# Patient Record
Sex: FEMALE | Race: WHITE | NOT HISPANIC OR LATINO | URBAN - METROPOLITAN AREA
[De-identification: names, ages, dates, MRNs, and addresses within clinical notes are randomized per-mention and may not be internally consistent; named-entity substitution may affect disease eponyms.]

---

## 2018-05-09 ENCOUNTER — RECORDS - HEALTHEAST (OUTPATIENT)
Dept: LAB | Facility: CLINIC | Age: 48
End: 2018-05-09

## 2018-05-10 LAB
ANION GAP SERPL CALCULATED.3IONS-SCNC: 7 MMOL/L (ref 5–18)
BUN SERPL-MCNC: 10 MG/DL (ref 8–22)
CALCIUM SERPL-MCNC: 9 MG/DL (ref 8.5–10.5)
CHLORIDE BLD-SCNC: 105 MMOL/L (ref 98–107)
CO2 SERPL-SCNC: 29 MMOL/L (ref 22–31)
CREAT SERPL-MCNC: 0.64 MG/DL (ref 0.6–1.1)
ERYTHROCYTE [DISTWIDTH] IN BLOOD BY AUTOMATED COUNT: 13.8 % (ref 11–14.5)
FOLATE SERPL-MCNC: 9.7 NG/ML
GFR SERPL CREATININE-BSD FRML MDRD: >60 ML/MIN/1.73M2
GLUCOSE BLD-MCNC: 83 MG/DL (ref 70–125)
HCT VFR BLD AUTO: 45.3 % (ref 35–47)
HGB BLD-MCNC: 14.6 G/DL (ref 12–16)
MCH RBC QN AUTO: 29.8 PG (ref 27–34)
MCHC RBC AUTO-ENTMCNC: 32.2 G/DL (ref 32–36)
MCV RBC AUTO: 92 FL (ref 80–100)
PLATELET # BLD AUTO: 221 THOU/UL (ref 140–440)
PMV BLD AUTO: 12.6 FL (ref 8.5–12.5)
POTASSIUM BLD-SCNC: 4 MMOL/L (ref 3.5–5)
RBC # BLD AUTO: 4.9 MILL/UL (ref 3.8–5.4)
SODIUM SERPL-SCNC: 141 MMOL/L (ref 136–145)
TSH SERPL DL<=0.005 MIU/L-ACNC: 0.95 UIU/ML (ref 0.3–5)
VIT B12 SERPL-MCNC: 338 PG/ML (ref 213–816)
WBC: 7.9 THOU/UL (ref 4–11)

## 2018-05-11 LAB — 25(OH)D3 SERPL-MCNC: 25.4 NG/ML (ref 30–80)

## 2018-06-25 ENCOUNTER — RECORDS - HEALTHEAST (OUTPATIENT)
Dept: ADMINISTRATIVE | Facility: OTHER | Age: 48
End: 2018-06-25

## 2018-10-22 ENCOUNTER — HOSPITAL ENCOUNTER (OUTPATIENT)
Dept: RESPIRATORY THERAPY | Facility: HOSPITAL | Age: 48
Discharge: HOME | End: 2018-10-22

## 2018-10-22 DIAGNOSIS — J44.9 CHRONIC OBSTRUCTIVE LUNG DISEASE (H): ICD-10-CM

## 2018-10-22 LAB — HGB BLD-MCNC: 14.7 G/DL (ref 12–16)

## 2018-11-28 ENCOUNTER — RECORDS - HEALTHEAST (OUTPATIENT)
Dept: LAB | Facility: CLINIC | Age: 48
End: 2018-11-28

## 2018-11-28 LAB
BASOPHILS # BLD AUTO: 0 THOU/UL (ref 0–0.2)
BASOPHILS NFR BLD AUTO: 0 % (ref 0–2)
C REACTIVE PROTEIN LHE: <0.1 MG/DL (ref 0–0.8)
EOSINOPHIL # BLD AUTO: 0.1 THOU/UL (ref 0–0.4)
EOSINOPHIL NFR BLD AUTO: 1 % (ref 0–6)
ERYTHROCYTE [DISTWIDTH] IN BLOOD BY AUTOMATED COUNT: 14.2 % (ref 11–14.5)
ERYTHROCYTE [SEDIMENTATION RATE] IN BLOOD BY WESTERGREN METHOD: 2 MM/HR (ref 0–20)
HCT VFR BLD AUTO: 46.3 % (ref 35–47)
HGB BLD-MCNC: 14.8 G/DL (ref 12–16)
LYMPHOCYTES # BLD AUTO: 1.4 THOU/UL (ref 0.8–4.4)
LYMPHOCYTES NFR BLD AUTO: 16 % (ref 20–40)
MCH RBC QN AUTO: 29.3 PG (ref 27–34)
MCHC RBC AUTO-ENTMCNC: 32 G/DL (ref 32–36)
MCV RBC AUTO: 92 FL (ref 80–100)
MONOCYTES # BLD AUTO: 0.6 THOU/UL (ref 0–0.9)
MONOCYTES NFR BLD AUTO: 7 % (ref 2–10)
NEUTROPHILS # BLD AUTO: 6.8 THOU/UL (ref 2–7.7)
NEUTROPHILS NFR BLD AUTO: 76 % (ref 50–70)
PLATELET # BLD AUTO: 211 THOU/UL (ref 140–440)
PMV BLD AUTO: 12.3 FL (ref 8.5–12.5)
RBC # BLD AUTO: 5.05 MILL/UL (ref 3.8–5.4)
RHEUMATOID FACT SERPL-ACNC: <15 IU/ML (ref 0–30)
WBC: 8.9 THOU/UL (ref 4–11)

## 2018-11-29 LAB
25(OH)D3 SERPL-MCNC: 40.2 NG/ML (ref 30–80)
ANA SER QL: 0.8 U

## 2019-07-10 ENCOUNTER — HOSPITAL ENCOUNTER (INPATIENT)
Facility: CLINIC | Age: 49
LOS: 9 days | Discharge: HOME | DRG: 885 | End: 2019-07-19
Attending: PSYCHIATRY & NEUROLOGY | Admitting: PSYCHIATRY & NEUROLOGY
Payer: COMMERCIAL

## 2019-07-10 ENCOUNTER — TELEPHONE (OUTPATIENT)
Dept: BEHAVIORAL HEALTH | Facility: CLINIC | Age: 49
End: 2019-07-10

## 2019-07-10 DIAGNOSIS — F41.1 GAD (GENERALIZED ANXIETY DISORDER): ICD-10-CM

## 2019-07-10 DIAGNOSIS — J45.31 MILD PERSISTENT ASTHMA WITH EXACERBATION: ICD-10-CM

## 2019-07-10 DIAGNOSIS — G25.0 ESSENTIAL TREMOR: ICD-10-CM

## 2019-07-10 DIAGNOSIS — F33.2 SEVERE RECURRENT MAJOR DEPRESSION WITHOUT PSYCHOTIC FEATURES (H): Primary | ICD-10-CM

## 2019-07-10 PROBLEM — F32.A DEPRESSION: Status: ACTIVE | Noted: 2019-07-10

## 2019-07-10 LAB
ANION GAP SERPL CALCULATED.3IONS-SCNC: 7 MMOL/L (ref 3–14)
BASOPHILS # BLD AUTO: 0 10E9/L (ref 0–0.2)
BASOPHILS NFR BLD AUTO: 0.1 %
BUN SERPL-MCNC: 15 MG/DL (ref 7–30)
CALCIUM SERPL-MCNC: 9.1 MG/DL (ref 8.5–10.1)
CHLORIDE SERPL-SCNC: 106 MMOL/L (ref 94–109)
CO2 SERPL-SCNC: 29 MMOL/L (ref 20–32)
CREAT SERPL-MCNC: 0.74 MG/DL (ref 0.52–1.04)
DIFFERENTIAL METHOD BLD: ABNORMAL
EOSINOPHIL # BLD AUTO: 0 10E9/L (ref 0–0.7)
EOSINOPHIL NFR BLD AUTO: 0.1 %
ERYTHROCYTE [DISTWIDTH] IN BLOOD BY AUTOMATED COUNT: 13.8 % (ref 10–15)
GFR SERPL CREATININE-BSD FRML MDRD: >90 ML/MIN/{1.73_M2}
GLUCOSE SERPL-MCNC: 118 MG/DL (ref 70–99)
HCT VFR BLD AUTO: 46.4 % (ref 35–47)
HGB BLD-MCNC: 15.4 G/DL (ref 11.7–15.7)
IMM GRANULOCYTES # BLD: 0 10E9/L (ref 0–0.4)
IMM GRANULOCYTES NFR BLD: 0.2 %
LYMPHOCYTES # BLD AUTO: 0.8 10E9/L (ref 0.8–5.3)
LYMPHOCYTES NFR BLD AUTO: 8.2 %
MCH RBC QN AUTO: 29.8 PG (ref 26.5–33)
MCHC RBC AUTO-ENTMCNC: 33.2 G/DL (ref 31.5–36.5)
MCV RBC AUTO: 90 FL (ref 78–100)
MONOCYTES # BLD AUTO: 0.4 10E9/L (ref 0–1.3)
MONOCYTES NFR BLD AUTO: 4.6 %
NEUTROPHILS # BLD AUTO: 8.4 10E9/L (ref 1.6–8.3)
NEUTROPHILS NFR BLD AUTO: 86.8 %
NRBC # BLD AUTO: 0 10*3/UL
NRBC BLD AUTO-RTO: 0 /100
PLATELET # BLD AUTO: 185 10E9/L (ref 150–450)
POTASSIUM SERPL-SCNC: 4.3 MMOL/L (ref 3.4–5.3)
RBC # BLD AUTO: 5.16 10E12/L (ref 3.8–5.2)
SODIUM SERPL-SCNC: 142 MMOL/L (ref 133–144)
TSH SERPL DL<=0.005 MIU/L-ACNC: 0.62 MU/L (ref 0.4–4)
WBC # BLD AUTO: 9.6 10E9/L (ref 4–11)

## 2019-07-10 PROCEDURE — 84443 ASSAY THYROID STIM HORMONE: CPT | Performed by: PSYCHIATRY & NEUROLOGY

## 2019-07-10 PROCEDURE — 80048 BASIC METABOLIC PNL TOTAL CA: CPT | Performed by: PSYCHIATRY & NEUROLOGY

## 2019-07-10 PROCEDURE — 36415 COLL VENOUS BLD VENIPUNCTURE: CPT | Performed by: PSYCHIATRY & NEUROLOGY

## 2019-07-10 PROCEDURE — 12400000 ZZH R&B MH

## 2019-07-10 PROCEDURE — 90853 GROUP PSYCHOTHERAPY: CPT

## 2019-07-10 PROCEDURE — 25000132 ZZH RX MED GY IP 250 OP 250 PS 637: Performed by: PSYCHIATRY & NEUROLOGY

## 2019-07-10 PROCEDURE — 85025 COMPLETE CBC W/AUTO DIFF WBC: CPT | Performed by: PSYCHIATRY & NEUROLOGY

## 2019-07-10 RX ORDER — PREDNISONE 1 MG/1
1-4 TABLET ORAL DAILY
Status: ON HOLD | COMMUNITY
Start: 2019-07-04 | End: 2019-07-19

## 2019-07-10 RX ORDER — NICOTINE 21 MG/24HR
1 PATCH, TRANSDERMAL 24 HOURS TRANSDERMAL EVERY 24 HOURS
Status: DISCONTINUED | OUTPATIENT
Start: 2019-07-10 | End: 2019-07-10

## 2019-07-10 RX ORDER — QUETIAPINE FUMARATE 50 MG/1
150 TABLET, FILM COATED ORAL AT BEDTIME
Status: ON HOLD | COMMUNITY
End: 2019-07-19

## 2019-07-10 RX ORDER — GABAPENTIN 100 MG/1
100 CAPSULE ORAL 2 TIMES DAILY
COMMUNITY

## 2019-07-10 RX ORDER — VENLAFAXINE 37.5 MG/1
37.5 TABLET ORAL 2 TIMES DAILY
Status: DISCONTINUED | OUTPATIENT
Start: 2019-07-10 | End: 2019-07-11

## 2019-07-10 RX ORDER — PREDNISONE 1 MG/1
1-4 TABLET ORAL DAILY
Status: DISCONTINUED | OUTPATIENT
Start: 2019-07-10 | End: 2019-07-10

## 2019-07-10 RX ORDER — PREDNISONE 1 MG/1
3 TABLET ORAL DAILY
Status: DISPENSED | OUTPATIENT
Start: 2019-07-10 | End: 2019-07-13

## 2019-07-10 RX ORDER — PREDNISONE 1 MG/1
1 TABLET ORAL DAILY
Status: DISCONTINUED | OUTPATIENT
Start: 2019-07-18 | End: 2019-07-19 | Stop reason: HOSPADM

## 2019-07-10 RX ORDER — HYDROXYZINE HYDROCHLORIDE 25 MG/1
25 TABLET, FILM COATED ORAL EVERY 4 HOURS PRN
Status: DISCONTINUED | OUTPATIENT
Start: 2019-07-10 | End: 2019-07-19 | Stop reason: HOSPADM

## 2019-07-10 RX ORDER — NICOTINE 21 MG/24HR
1 PATCH, TRANSDERMAL 24 HOURS TRANSDERMAL DAILY
Status: DISCONTINUED | OUTPATIENT
Start: 2019-07-10 | End: 2019-07-19 | Stop reason: HOSPADM

## 2019-07-10 RX ORDER — PREDNISONE 1 MG/1
2 TABLET ORAL DAILY
Status: COMPLETED | OUTPATIENT
Start: 2019-07-13 | End: 2019-07-17

## 2019-07-10 RX ORDER — VENLAFAXINE 37.5 MG/1
37.5 TABLET ORAL 2 TIMES DAILY
Status: ON HOLD | COMMUNITY
End: 2019-07-19

## 2019-07-10 RX ORDER — DULOXETIN HYDROCHLORIDE 30 MG/1
30 CAPSULE, DELAYED RELEASE ORAL DAILY
Status: ON HOLD | COMMUNITY
End: 2019-07-19

## 2019-07-10 RX ORDER — NICOTINE 21 MG/24HR
1 PATCH, TRANSDERMAL 24 HOURS TRANSDERMAL EVERY 24 HOURS
COMMUNITY
End: 2022-08-29

## 2019-07-10 RX ADMIN — QUETIAPINE FUMARATE 150 MG: 100 TABLET ORAL at 22:01

## 2019-07-10 RX ADMIN — VENLAFAXINE HYDROCHLORIDE 37.5 MG: 37.5 TABLET ORAL at 22:06

## 2019-07-10 RX ADMIN — GABAPENTIN 400 MG: 300 CAPSULE ORAL at 21:12

## 2019-07-10 RX ADMIN — NICOTINE 1 PATCH: 21 PATCH, EXTENDED RELEASE TRANSDERMAL at 16:43

## 2019-07-10 ASSESSMENT — ACTIVITIES OF DAILY LIVING (ADL)
TOILETING: 0-->INDEPENDENT
COGNITION: 0 - NO COGNITION ISSUES REPORTED
FALL_HISTORY_WITHIN_LAST_SIX_MONTHS: NO
RETIRED_EATING: 0-->INDEPENDENT
TRANSFERRING: 0-->INDEPENDENT
SWALLOWING: 0-->SWALLOWS FOODS/LIQUIDS WITHOUT DIFFICULTY
DRESS: 0-->INDEPENDENT
RETIRED_COMMUNICATION: 0-->UNDERSTANDS/COMMUNICATES WITHOUT DIFFICULTY
BATHING: 0-->INDEPENDENT
AMBULATION: 0-->INDEPENDENT

## 2019-07-10 ASSESSMENT — MIFFLIN-ST. JEOR: SCORE: 1457.31

## 2019-07-10 NOTE — PHARMACY-ADMISSION MEDICATION HISTORY
Admission medication history interview status for the 7/10/2019  admission is complete. See EPIC admission navigator for prior to admission medications     Medication history source reliability:Good    Actions taken by pharmacist (provider contacted, etc): Chart review. Face to face interview with patient      Additional medication history information not noted on PTA med list :None    Medication reconciliation/reorder completed by provider prior to medication history? No    Time spent in this activity: 15 minutes    Prior to Admission medications    Medication Sig Last Dose Taking? Auth Provider   cariprazine (VRAYLAR) 3 MG CAPS capsule Take 3 mg by mouth daily 7/10/2019 at am Yes Unknown, Entered By History   DULoxetine (CYMBALTA) 30 MG capsule Take 30 mg by mouth daily  Yes Unknown, Entered By History   Fluticasone-Umeclidin-Vilanterol (TRELEGY ELLIPTA) 100-62.5-25 MCG/INH oral inhaler Inhale 1 puff into the lungs daily 7/9/2019 at Unknown time Yes Unknown, Entered By History   gabapentin (NEURONTIN) 400 MG capsule Take 400 mg by mouth At Bedtime 7/9/2019 at hs Yes Unknown, Entered By History   nicotine (NICODERM CQ) 21 MG/24HR 24 hr patch Place 1 patch onto the skin every 24 hours 7/9/2019 at Unknown time Yes Unknown, Entered By History   nicotine (NICORETTE) 4 MG lozenge Place 4 mg inside cheek as needed for smoking cessation Past Week at prn Yes Unknown, Entered By History   predniSONE (DELTASONE) 1 MG tablet Take 1-4 mg by mouth daily Taper started 7/4, Thursday. 4 mg daily x 5 days, 3 mg daily x 5 days, 2 mg daily x 5 days, 1 mg daily x 5 days. 7/9/2019 at am Yes Unknown, Entered By History   QUEtiapine (SEROQUEL) 50 MG tablet Take 150 mg by mouth At Bedtime 7/9/2019 at hs Yes Unknown, Entered By History   venlafaxine (EFFEXOR) 37.5 MG tablet Take 37.5 mg by mouth 2 times daily 7/10/2019 at am Yes Unknown, Entered By History       .

## 2019-07-10 NOTE — TELEPHONE ENCOUNTER
R: author called [facility] at 9 am and left a vm on their voice mail stating we have not yet received copies of pt's labs and asking them to fax them when available. juliana  Labs are WNL other than cloudy urine, trace leukocytes, few bacteria, WBC clumps present, sqaum epithel UA , trans epithel UA 5-10, mucuss UA many. Utox neg. Preg test neg. Author left a vm for Dada at 10:17 am asking for a call back. juliana  Per Shawna, pt does not have a UTI. juliana Garland said to ensure pt is informed she will be admitting to [facility] so she is aware she would be in [place], [place]. Author discussed w/ Shawna at [facility] who said she will clarify this with pt and will call back. juliana  #77/dada accepted for [facility]oren                              Per Shawna, pt understands she is coming to [place] and is voluntary.                                  Author faxed clinical to #[phone]                                   juliana

## 2019-07-10 NOTE — PROGRESS NOTES
Eva , was admitted with depression from [facility].  She was just discharge two weeks ago.  Her depression is getting worse, she is unable to go to work,  Has suicidal thoughts but not a plan,  Has COPD , that makes her depressed, because at times she can not breath, has  Back pain with peripheral neuropathies and that bothers her as well.  When she was discharged from [facility] they put her on Cymbalta 30 mg, for her depression and nerve pain, but she stopped because the medication gave her diarrhea.    This morning she feeling extremely depressed, and having suicidal thoughts , She cries on and off during the interview, at this times does not feel suicidal, contracts for safety.  Admission completed.

## 2019-07-10 NOTE — PROGRESS NOTES
07/10/19 1506   Patient Belongings   Did you bring any home meds/supplements to the hospital?  Yes   Disposition of meds  Pharmacy approved for patient use (see comment);Other (see comment)   Patient Belongings locker;other (see comments)   Patient Belongings Put in Hospital Secure Location (Security or Locker, etc.) clothing;other (see comments)   Belongings Search Yes   Clothing Search Yes   Second Staff Lidia     Inhaler x3   Inhaler Capsules  Nicotine Patch x2  CPAP  Drawstring Bag  Plastic Baggies  Sweater  Work Seek  [place]  Umbrella  Single Glove  Leggings  Shirt  Bra  Shoes w/Strings  Purse  Charging Cords x2  Liquid Breathe Mint  Portable  x2  Nicotine Lozenge  Lens Towelette  Ear Buds x2  [place] Badge  Cigarettes  Keys x2  Lip Balm x3  Cell Phone  Lighter  $4 Cash  [place] Drivers License  Health [employer] Card  [employer] Card    Security Envelope #[id number]:   Check Book #[id number]   [employer] Card   Visa #[id number]   M.C. #[id number]   [address]   M.C. #[id number]   [employer] Card #[id number]   Care Credit #[id number]   M.C. #[id number]   Visa #[id number]    Admission:  I am responsible for any personal items that are not sent to the safe or pharmacy. [facility] is not responsible for loss, theft or damage of any property in my possession.        Patient Signature: ___________________________________________      Date/Time:__________________________     Staff Signature: __________________________________      Date/Time:__________________________     [facility] Staff person, if patient is unable/unwilling to sign:      __________________________________________________________      Date/Time: __________________________        Discharge:  [facility] has returned all of my personal belongings:     Patient Signature: ________________________________________     Date/Time: ____________________________________     Staff Signature: ______________________________________     Date/Time:_____________________________________

## 2019-07-10 NOTE — TELEPHONE ENCOUNTER
"S: 49 y/o female presented to the [facility] ED with depression and SI.    B: Hx depression, COPD, tobacco use d/o, GERD, chronic leg and back pain.  SI w/a plan to overdose on her antidepressants.  Pt was recently discharged from [facility] on 6/26 after having a similar presentation.  Pt reported increased depression and SI due to \"exacerbation of her medical and mobility limitations, as well as overall decline of her general health and well-being.\"  Pt also reported anxiety around her ability to walk and hold herself upright as well as issues with balance at times but denied hx of falls.  Collateral obtained from pt's son who reported concerns for her safety if she were to be alone at home.  Denied HI, psychosis, substance abuse or prior suicide attempts.  Pt reported compliance with medications but has been experiencing negative side effects, such as diarrhea.      A: Voluntary  Medically cleared.  Pt uses a CPAP at night   reported pt ambulates independently    R: CSW Katherine reported pt did not have labs drawn for this encounter.  This writer requested CBC, CMP and drug screen.  Awaiting results from [facility].  0700 Case has been passed to day shift to follow.  "

## 2019-07-11 PROCEDURE — 99207 ZZC CONSULT E&M CHANGED TO INITIAL LEVEL: CPT | Performed by: PHYSICIAN ASSISTANT

## 2019-07-11 PROCEDURE — 99222 1ST HOSP IP/OBS MODERATE 55: CPT | Performed by: PHYSICIAN ASSISTANT

## 2019-07-11 PROCEDURE — 25000132 ZZH RX MED GY IP 250 OP 250 PS 637: Performed by: PSYCHIATRY & NEUROLOGY

## 2019-07-11 PROCEDURE — 12400000 ZZH R&B MH

## 2019-07-11 PROCEDURE — 90853 GROUP PSYCHOTHERAPY: CPT

## 2019-07-11 PROCEDURE — 25000131 ZZH RX MED GY IP 250 OP 636 PS 637: Performed by: PSYCHIATRY & NEUROLOGY

## 2019-07-11 RX ORDER — VENLAFAXINE HYDROCHLORIDE 75 MG/1
75 CAPSULE, EXTENDED RELEASE ORAL
Status: DISCONTINUED | OUTPATIENT
Start: 2019-07-12 | End: 2019-07-15

## 2019-07-11 RX ADMIN — FLUTICASONE FUROATE AND VILANTEROL TRIFENATATE 1 PUFF: 100; 25 POWDER RESPIRATORY (INHALATION) at 08:33

## 2019-07-11 RX ADMIN — NICOTINE 1 PATCH: 21 PATCH, EXTENDED RELEASE TRANSDERMAL at 08:33

## 2019-07-11 RX ADMIN — QUETIAPINE FUMARATE 150 MG: 100 TABLET ORAL at 22:03

## 2019-07-11 RX ADMIN — GABAPENTIN 400 MG: 300 CAPSULE ORAL at 21:40

## 2019-07-11 RX ADMIN — PREDNISONE 3 MG: 1 TABLET ORAL at 08:32

## 2019-07-11 RX ADMIN — CARIPRAZINE 3 MG: 1.5 CAPSULE, GELATIN COATED ORAL at 08:32

## 2019-07-11 RX ADMIN — UMECLIDINIUM 1 PUFF: 62.5 AEROSOL, POWDER ORAL at 08:35

## 2019-07-11 RX ADMIN — VORTIOXETINE 5 MG: 5 TABLET, FILM COATED ORAL at 21:40

## 2019-07-11 RX ADMIN — VENLAFAXINE HYDROCHLORIDE 37.5 MG: 37.5 TABLET ORAL at 08:32

## 2019-07-11 ASSESSMENT — ACTIVITIES OF DAILY LIVING (ADL)
HYGIENE/GROOMING: PROMPTS
LAUNDRY: UNABLE TO COMPLETE
ORAL_HYGIENE: PROMPTS
DRESS: PROMPTS
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
## 2019-07-11 NOTE — PROGRESS NOTES
07/11/19 1300   General Information   Has Not Attended OT as of: 07/11/19     Pt has not attended OT since admit.  Will continue to encourage participation and completion of  self-assessment as able. OT staff will explain the purpose of being involved with treatment plan and provide options to meet current needs and goals.

## 2019-07-11 NOTE — PLAN OF CARE
Pt presents with tense affect and anxious mood. Pt states she is not feeling suicidal but only because she knows there are no means to do so while on the unit. While inquiring about her day she was visibly shaking and when questioned about it she said it's because of her anxiety regarding current life situation. Med compliant, no Si. Visible on the unit.

## 2019-07-11 NOTE — PROGRESS NOTES
Hospitalist admission note dictated. Confirmation #: [id number].    JoAnna Barthell, PA-C  7/11/2019   1:56 PM

## 2019-07-11 NOTE — PLAN OF CARE
BEHAVIORAL TEAM DISCUSSION    Participants: *MD, RN's, CM, PA, OT   Progress: **Pt still feeling hopeless*  Continued Stay Criteria/Rationale: Pt needs further stabilization  Medical/Physical: NA  Precautions: Code 1  Behavioral Orders   Procedures    Code 1 - Restrict to Unit    Routine Programming     As clinically indicated    Status 15     Every 15 minutes.     Plan: **Pt to continue medication adjustment*  Rationale for change in precautions or plan: Pt has not improved and is still feeling hopeless

## 2019-07-11 NOTE — CONSULTS
Consult Date:  07/11/2019      PRIMARY CARE PROVIDER:  Reymundo Avila MD.      REQUESTING PROVIDER:  Dr. Wilkins.      REASON FOR CONSULTATION:  Medical H&P.      History is provided by the patient.      HISTORY OF PRESENT ILLNESS:  Eva Parikh is a 48-year-old female with nicotine dependence, GERD, COPD, insomnia, and anxiety/depression who was admitted under the care of inpatient psychiatry after presenting with suicidal ideation.  She was recently admitted at [facility] for 1 day on 06/25 to 06/26 for similar.      The patient reports her depression symptoms have been worse in the last 2 weeks, ever since her discharge.  Regarding her medical history, she has COPD and she is currently on a steroid taper with remaining approximately 15 days of 3 mg x5, 2 mg x5, 1 mg x5.  She reports this was started when seen in followup from her brief psychiatric hospitalization approximately 2 weeks ago when the provider heard wheezing on physical exam.  She reports she is no longer wheezing and her breathing is at baseline, but she would like to continue the prednisone taper.  She is compliant with her medications.  The only other symptom she reports is 1 episode of diarrhea yesterday that has since resolved.      She denies recent fevers, URI symptoms, cough worse than baseline, chest discomfort, shortness of breath beyond her baseline, abdominal pain, nausea, vomiting, constipation, and urinary symptoms.  She states that she sometimes feels like she gets swelling in legs, but others tell her that her legs look normal.      PAST MEDICAL HISTORY:   1.  COPD.   2.  GERD.   3.  Nicotine dependence.   4.  Anxiety/depression.   5.  Insomnia.      PAST SURGICAL HISTORY:  Tonsillectomy and adenoidectomy.      FAMILY HISTORY:   Mother:  Heart disease.   Maternal grandmother:  Heart disease.      SOCIAL HISTORY:  One pack per day starting at the age of 10, giving her an estimated 19-dmgx-rytl history.  She does report  that she has been smoking more in the last 2 weeks.   No illicit drug use or alcohol use.  She is currently employed as a  for [employer].      PRIOR TO ADMISSION MEDICATIONS:  Reviewed in Epic.      ALLERGIES:  NO KNOWN DRUG ALLERGIES.      PHYSICAL EXAMINATION:   VITAL SIGNS:  Blood pressure 117/71, heart rate is 104, temperature 97.9, respirations 16, oxygen saturation 91%.   GENERAL:  A pleasant female who appears stated age.  She appears anxious and has a mild tremor in her left upper extremity.  She is slow to respond at times.   SKIN:  Warm, dry.  No rash or lesions on the skin.   HEENT:  Normocephalic, atraumatic.  EOMs grossly intact and PERRLA.  Moist mucous membranes.   NECK:  Supple.  No cervical lymphadenopathy or JVD.   CHEST:  Breath sounds with faint scattered wheezes on expiration and no increased work of breathing.   CARDIOVASCULAR:  Regular rate and rhythm.  No rub or murmur.  No peripheral edema.   ABDOMEN:  Soft, nontender, nondistended.   MUSCULOSKELETAL:  Moves all 4 extremities.   NEUROLOGIC:  Cranial nerves II-XII grossly intact.      LABORATORY DATA:  Sodium 142, potassium 4.3, creatinine 0.74, glucose 118.  WBC 9.6, hemoglobin 15.4, platelets 185,000.   TSH 0.62.      IMAGING:  None.      ASSESSMENT AND PLAN:  Eva Parikh is a 48-year-old female with insomnia, anxiety/depression, GERD, nicotine dependence, and COPD who is admitted under the care of inpatient psychiatry after presenting with suicidal ideation in setting of worsening depression over the past 2 weeks with recent [facility] admission 06/25 to 06/26.  Hospitalist Service was consulted for medical H&P.     1.  Suicidal ideation.   2.  Anxiety/depression.   3.  Insomnia.   -- Basic labs and TSH do not suggest an organic cause.  She does not describe any concerning signs or symptoms that would suggest an organic cause.   -- Defer management to inpatient psychiatry.   -- Recommend obtaining EKG if  ECT is consideration during this hospitalization and would have Hospitalist review.  Otherwise, no urgent indication to obtain EKG.     4.  Chronic obstructive pulmonary disease with ongoing tobacco dependence.   -- Scattered faint expiratory wheezes on physical exam.  Started on prednisone taper .   -- Continue prednisone taper as prescribed.   -- Encourage smoking cessation and nicotine patch.   5.  Gastroesophageal reflux disease.  Continue PTA PPI inhibitor.     Deep venous thrombosis prophylaxis:  Ambulation.      CODE STATUS:  Deferred full code.      DISPOSITION:  Discharge per inpatient psychiatry.      Hospitalist Service thanks you for this consultation.  At this time, will sign off as there are no acute medical conditions that require daily hospital rounding.  As above, the PCP has consideration this hospital stay.  Please obtain screening EKG ask Hospitalist for review.  Please call with acute concerns.      This patient was discussed with Dr. Inessa Carvalho of the Hospitalist Service who is in agreement with my assessment and plan of care as outlined above.         INESSA CARVALHO MD       As dictated by JOANNA ULMEN BARTHELL, PA-C            D: 2019   T: 2019   MT: GENNA      Name:     SUSANNA OTOOLE   MRN:      [id number]        Account:       [id number]   :      1970           Consult Date:  2019      Document: [id number]       cc: Magali Wilkins MD

## 2019-07-12 PROCEDURE — 25000131 ZZH RX MED GY IP 250 OP 636 PS 637: Performed by: PSYCHIATRY & NEUROLOGY

## 2019-07-12 PROCEDURE — 90853 GROUP PSYCHOTHERAPY: CPT

## 2019-07-12 PROCEDURE — 90791 PSYCH DIAGNOSTIC EVALUATION: CPT

## 2019-07-12 PROCEDURE — 12400000 ZZH R&B MH

## 2019-07-12 PROCEDURE — 25000132 ZZH RX MED GY IP 250 OP 250 PS 637: Performed by: PSYCHIATRY & NEUROLOGY

## 2019-07-12 RX ADMIN — HYDROXYZINE HYDROCHLORIDE 25 MG: 25 TABLET ORAL at 17:05

## 2019-07-12 RX ADMIN — FLUTICASONE FUROATE AND VILANTEROL TRIFENATATE 1 PUFF: 100; 25 POWDER RESPIRATORY (INHALATION) at 08:20

## 2019-07-12 RX ADMIN — UMECLIDINIUM 1 PUFF: 62.5 AEROSOL, POWDER ORAL at 08:20

## 2019-07-12 RX ADMIN — NICOTINE POLACRILEX 2 MG: 2 GUM, CHEWING ORAL at 14:50

## 2019-07-12 RX ADMIN — GABAPENTIN 400 MG: 300 CAPSULE ORAL at 21:08

## 2019-07-12 RX ADMIN — NICOTINE 1 PATCH: 21 PATCH, EXTENDED RELEASE TRANSDERMAL at 08:19

## 2019-07-12 RX ADMIN — PREDNISONE 3 MG: 1 TABLET ORAL at 08:20

## 2019-07-12 RX ADMIN — QUETIAPINE FUMARATE 150 MG: 100 TABLET ORAL at 22:06

## 2019-07-12 RX ADMIN — NICOTINE POLACRILEX 2 MG: 2 GUM, CHEWING ORAL at 09:57

## 2019-07-12 RX ADMIN — NICOTINE POLACRILEX 2 MG: 2 GUM, CHEWING ORAL at 19:37

## 2019-07-12 RX ADMIN — VENLAFAXINE HYDROCHLORIDE 75 MG: 75 CAPSULE, EXTENDED RELEASE ORAL at 08:20

## 2019-07-12 RX ADMIN — VORTIOXETINE 5 MG: 5 TABLET, FILM COATED ORAL at 19:37

## 2019-07-12 NOTE — H&P
Case Management Social History    This information has been obtained from patient's chart and through a personal interview with patient. Patient was noted to be very tense and anxious throughout the interview. Patient is deemed a reliable historian.       Reason for Admission:  Eva Parikh is a 48 year old single female admitted to [facility] on 07/10/19. She is currently hospitalized voluntarily. She states that she has been having increasing suicidal thoughts over the past two weeks. Her son brought her to the emergency department at [facility] in [place], and then she was consequently transferred to [facility]. She states that her medical issues related to COPD and chronic pain have been the primary factor in her decompensation.       Previous Mental & Chemical Dependency History:  She has had four previous mental health hospitalizations at [facility] in [place], dating back to 2010. Her most recent admission was in June of this year. She has no prior history of ECT or civil commitment.     She drinks five to six cans of pop with caffeine daily. She smokes one pack of cigarettes daily, but states that she has been smoking more than that recently. She states that she rarely drinks alcohol. She denies any issues with gambling or illicit drug usage. She has no prior history of chemical dependency treatment or DWIs.       Social History:  She is single and has never been . She has one son who is 37 years old. She was born in [place] and raised in [place]. Her mother continues to reside in [place]. She states that she does not know anything about her father. She has one younger sister. There is a family history of depression in her mother, aunt and sister.       Amish: She does not identify with any spiritual or Islam orientation.        History: She has no prior history of  service.       Education  and Work History:  She graduated from [employer] in [place] on 1987. She attended [employer] where she studied administrative support. She works full time at [employer] as a . She has been employed there for the past 12 years. She states that she last worked a week ago on Tuesday.       Living Situation: She lives in a house in [place] with her adult son.       Financial Status/Stressors:  She does not currently receive social security or disability income.       Medication Coverage: She denies any issues with affording her medication co-pays at this time.       Insurance:  She has [employer] for insurance.       Legal Issues:  She denies any current legal issues. She is her own guardian.       Community Resources: She sees Dr. Reymundo Avila at [facility] in [place] for primary care.  She sees Salvatore Hinds DNP, CNP at [facility] in [place] for medication management and [facility] for therapy. She states that she has upcoming appointments in July with all of her providers. She is able to drive to all of her appointments.       Social Functioning:  She enjoys doing puzzles and knitting.         Discharge Considerations:  She states that she will likely need a return to work letter from the psychiatrist. She is concerned over how she will get back home as she states that her son does not drive very far from home. She was encouraged to reach out to friends, family, neighbors or co-workers to see if anyone would be available to pick her up when she does discharge. Case Management will remain available to assist with any discharge needs.

## 2019-07-12 NOTE — PROGRESS NOTES
07/12/19 1100   General Information   Date Initially Attended OT 07/12/19   Clinical Impression   Affect Anxious   Orientation Oriented to person, place and time   Appearance and ADLs General cleanliness observed in most areas   Attention to Internal Stimuli No observed signs   Interaction Skills Interacts with prompts, minimal response   Ability to Communicate Needs Does so with prompts   Verbal Content Appropriate to topic   Ability to Maintain Boundaries Maintains appropriate physical boundaries;Maintains appropriate verbal boundaries   Participation Participates with frequent encouragement   Concentration Concentrates 5-10 minutes   Ability to Concentrate With structure   Follows and Comprehends Directions Independently follows multi-step directions   Memory Delayed and immediate recall intact   Organization Independently organizes medium tasks   Decision Making Needs further assessment   Planning and Problem Solving Needs further assessment   Ability to Apply and Learn Concepts Needs further assessment   Frustrations / Stress Tolerance Needs further assessment   Level of Insight Some insight   Self Esteem Poor self esteem   Social Supports Has knowledge of support systems   General Observation/Plan   General Observations/Plan See Comments     INITIAL O.T. ASSESSMENT:      Pt transitioned to OT group with MIN encouragement and engaged in therapeutic activity addressing functional cognition and interpersonal skills with task set up and extended time. Pt participated in therapeutic group activity and discussion addressing balanced scheduling/weekend planning. With task set up, pt ID'd activities to participate in and complete within the four categories of activities (self care, productivity, leisure, social), e.g. Shower, eat meals, attend group, watch TV, do a puzzle, play cards. Pt will continue to benefit from OT intervention to address implementation of positive functional coping skills, role performance,  and community reintegration.     OT assessment completed by semi-structured interview and direct observation. Cited SI as the reason for current hospitalization. Goals ID'd include to ID and implement new coping skills, increase motivation, and improve time management. OT staff explained the purpose of pt being involved in current treatment plan.      Plan: Encourage ongoing attendance as able to meet self-stated goals, implement positive coping skills, engage in therapeutic activities, and provide assessment ongoing.

## 2019-07-12 NOTE — PLAN OF CARE
Pt presented with a flat - blunt affect and depressed mood. Behavior is appropriate, thinking process is impaired and insight is poor. Brightens upon approach.Pt did not speak much during 1:1 but would participate upon encouragement  Denied SI, ate her meal, attended groups, and was med compliant

## 2019-07-12 NOTE — PLAN OF CARE
Affect flat/blunt, brightens slightly on approach and during conversation. Mood calm. Pt self motivated, showered. Attended groups. Quiet and soft spoken, less wide eyed. Appears less paranoid, does not appear guarded.

## 2019-07-12 NOTE — PROGRESS NOTES
[facility] Psychiatric Progress Note      Interval History:   Pt seen, team meeting held with , nursing staff, OT, and PA's to review diagnosis and treatment plan. Patient reports that she is still very anxious and tremulous. She says she is not ready for discharge. She reports absence of self harm thoughts, plans or intent. She says she did not realize that she is going off Vraylar. She was advised of her current medications and she verbalized understanding. She says she needs both nicotine gum and the patch to miller off her cravings for tobacco. She has not experienced any untoward effects from currently prescribed medications.     Review of systems:   The Review of Systems is negative other than noted in the HPI     Medications:       fluticasone-vilanterol  1 puff Inhalation Daily     gabapentin  400 mg Oral At Bedtime     nicotine  1 patch Transdermal Daily     nicotine   Transdermal Q8H     nicotine   Transdermal Daily     predniSONE  3 mg Oral Daily    Followed by     [START ON 7/13/2019] predniSONE  2 mg Oral Daily    Followed by     [START ON 7/18/2019] predniSONE  1 mg Oral Daily     QUEtiapine  150 mg Oral At Bedtime     umeclidinium  1 puff Inhalation Daily     venlafaxine  75 mg Oral Daily with breakfast     vortioxetine  5 mg Oral Daily at 8 pm     hydrOXYzine    Mental Status Examination:     Appearance:  awake, alert, adequately groomed and casually dressed  Eye Contact:  better  Speech:  clear, coherent  Psychomotor Behavior:  no evidence of tardive dyskinesia, dystonia, or tics and fidgeting  Mood: depressed and sad.  Affect:  Mood congruent with heightened intensity.  Thought Process:  logical, linear and goal oriented no loose associations  Thought Content:  no evidence of suicidal ideation or homicidal ideation. Denies auditory or visual hallucinations.   Oriented to:  time, person, and place  Attention Span and Concentration:  limited  Recent and Remote Memory:   limited  Fund of Knowledge: appropriate  Muscle Strength and Tone: normal  Gait and Station: Normal  Insight:  fair  Judgment:  limited          Labs/Vitals:   No results found for this or any previous visit (from the past 24 hour(s)).  B/P: 108/68, T: 98.6, P: 97, R: 16    Impression:   Eva Parikh is a 48-year-old woman with established history of major depressive disorder, who was just discharged from  a couple weeks ago and presents at this time on account of worsening depression and suicidality.  She had not tolerated prescribed Cymbalta and had discontinued this medication.  She was started on Effexor and had been entertaining self-harm thoughts, hence hospitalization.       DIagnoses:     1.  Major depressive disorder, recurrent, severe without psychosis.  Rule out major depressive disorder, recurrent, severe with psychotic features.   2.  Chronic obstructive pulmonary disease.   3.  Asthma.   4.  Peripheral neuropathy.   5.  Chronic back pain.          Plan:   1. Written information given on medications. Side effects, risks, benefits reviewed.  2. Increase Effexor XR to 150 mg daily from Sunday.  3. Add Nicotine gum 2.mg  q 4 hrs prn.  4. Continue hospitalization.      Attestation:  Patient has been seen and evaluated by Magali varela MD    PATIENT ID  Name: Eva Parikh  MRN:[id number]  YOB: 1970

## 2019-07-13 PROCEDURE — 25000132 ZZH RX MED GY IP 250 OP 250 PS 637: Performed by: PSYCHIATRY & NEUROLOGY

## 2019-07-13 PROCEDURE — 25000131 ZZH RX MED GY IP 250 OP 636 PS 637: Performed by: PSYCHIATRY & NEUROLOGY

## 2019-07-13 PROCEDURE — 12400000 ZZH R&B MH

## 2019-07-13 RX ADMIN — NICOTINE 1 PATCH: 21 PATCH, EXTENDED RELEASE TRANSDERMAL at 08:38

## 2019-07-13 RX ADMIN — NICOTINE POLACRILEX 2 MG: 2 GUM, CHEWING ORAL at 15:45

## 2019-07-13 RX ADMIN — FLUTICASONE FUROATE AND VILANTEROL TRIFENATATE 1 PUFF: 100; 25 POWDER RESPIRATORY (INHALATION) at 08:46

## 2019-07-13 RX ADMIN — HYDROXYZINE HYDROCHLORIDE 25 MG: 25 TABLET ORAL at 15:46

## 2019-07-13 RX ADMIN — HYDROXYZINE HYDROCHLORIDE 25 MG: 25 TABLET ORAL at 10:55

## 2019-07-13 RX ADMIN — UMECLIDINIUM 1 PUFF: 62.5 AEROSOL, POWDER ORAL at 08:41

## 2019-07-13 RX ADMIN — VORTIOXETINE 5 MG: 5 TABLET, FILM COATED ORAL at 20:09

## 2019-07-13 RX ADMIN — PREDNISONE 2 MG: 1 TABLET ORAL at 08:40

## 2019-07-13 RX ADMIN — GABAPENTIN 400 MG: 300 CAPSULE ORAL at 21:29

## 2019-07-13 RX ADMIN — NICOTINE POLACRILEX 2 MG: 2 GUM, CHEWING ORAL at 11:05

## 2019-07-13 RX ADMIN — NICOTINE POLACRILEX 2 MG: 2 GUM, CHEWING ORAL at 20:11

## 2019-07-13 RX ADMIN — VENLAFAXINE HYDROCHLORIDE 75 MG: 75 CAPSULE, EXTENDED RELEASE ORAL at 08:41

## 2019-07-13 RX ADMIN — QUETIAPINE FUMARATE 150 MG: 100 TABLET ORAL at 21:29

## 2019-07-13 ASSESSMENT — ACTIVITIES OF DAILY LIVING (ADL)
ORAL_HYGIENE: INDEPENDENT
HYGIENE/GROOMING: SHOWER
LAUNDRY: WITH SUPERVISION
DRESS: STREET CLOTHES

## 2019-07-13 NOTE — PLAN OF CARE
Pt  has tense anxious affect, has tremors in her hands but states she has had these since she was a young girl.Pt cannot identify any triggers to her increased anxiety. This writer asked if she has filled out her safety plan and she said no. She needed help and prompts to fill out her support system . She rates her anxiety level # 8, Hydroxyzine given for this

## 2019-07-13 NOTE — PLAN OF CARE
This evening Pt appeared paranoid, had a wide-eye stare and was guarded in conversation. She was present on the unit and attended evening groups. During 1:1, Pt was guarded and only responded with one word answers. She denied any SI and stated she just feels tired and a little anxious. She reports she has not noted any changes with the medications.

## 2019-07-14 PROCEDURE — 12400000 ZZH R&B MH

## 2019-07-14 PROCEDURE — 25000132 ZZH RX MED GY IP 250 OP 250 PS 637: Performed by: PSYCHIATRY & NEUROLOGY

## 2019-07-14 PROCEDURE — 90853 GROUP PSYCHOTHERAPY: CPT

## 2019-07-14 PROCEDURE — 25000131 ZZH RX MED GY IP 250 OP 636 PS 637: Performed by: PSYCHIATRY & NEUROLOGY

## 2019-07-14 RX ORDER — QUETIAPINE FUMARATE 50 MG/1
50 TABLET, FILM COATED ORAL
Status: DISCONTINUED | OUTPATIENT
Start: 2019-07-14 | End: 2019-07-19 | Stop reason: HOSPADM

## 2019-07-14 RX ADMIN — VENLAFAXINE HYDROCHLORIDE 75 MG: 75 CAPSULE, EXTENDED RELEASE ORAL at 08:00

## 2019-07-14 RX ADMIN — QUETIAPINE FUMARATE 50 MG: 50 TABLET ORAL at 18:06

## 2019-07-14 RX ADMIN — FLUTICASONE FUROATE AND VILANTEROL TRIFENATATE 1 PUFF: 100; 25 POWDER RESPIRATORY (INHALATION) at 08:44

## 2019-07-14 RX ADMIN — NICOTINE POLACRILEX 2 MG: 2 GUM, CHEWING ORAL at 12:58

## 2019-07-14 RX ADMIN — VORTIOXETINE 5 MG: 5 TABLET, FILM COATED ORAL at 20:57

## 2019-07-14 RX ADMIN — NICOTINE 1 PATCH: 21 PATCH, EXTENDED RELEASE TRANSDERMAL at 08:42

## 2019-07-14 RX ADMIN — UMECLIDINIUM 1 PUFF: 62.5 AEROSOL, POWDER ORAL at 08:44

## 2019-07-14 RX ADMIN — HYDROXYZINE HYDROCHLORIDE 25 MG: 25 TABLET ORAL at 14:36

## 2019-07-14 RX ADMIN — GABAPENTIN 400 MG: 300 CAPSULE ORAL at 21:26

## 2019-07-14 RX ADMIN — PREDNISONE 2 MG: 1 TABLET ORAL at 08:43

## 2019-07-14 RX ADMIN — HYDROXYZINE HYDROCHLORIDE 25 MG: 25 TABLET ORAL at 10:20

## 2019-07-14 RX ADMIN — QUETIAPINE FUMARATE 150 MG: 100 TABLET ORAL at 22:23

## 2019-07-14 RX ADMIN — NICOTINE POLACRILEX 2 MG: 2 GUM, CHEWING ORAL at 17:56

## 2019-07-14 ASSESSMENT — ACTIVITIES OF DAILY LIVING (ADL)
LAUNDRY: WITH SUPERVISION
DRESS: STREET CLOTHES
HYGIENE/GROOMING: INDEPENDENT
ORAL_HYGIENE: INDEPENDENT

## 2019-07-14 NOTE — PLAN OF CARE
Pt presents with flat, tense affect and anxious mood. Pt spent most of the shift in the lounge watching tv and working on a puzzle. Pt stated the working on her puzzle helped with her anxiety.  Pt attended groups and participated appropriately. Pt appears to have poor insight  into situation and impaired thinking. Pt stated that she is still having thoughts of SI, but that she does not have a plan and does contract for safety.

## 2019-07-14 NOTE — PLAN OF CARE
Patient still has high anxiety and is worried that she will have thought of overdosing when she goes home, she has tense anxious affect,wide eye stare. Takes hydroxyzine which helps some but was found after lunch standing in the hallway staring. Pt attending groups or in the lounge.Pt has fleeting thoughts of suicide and contracts for safety

## 2019-07-15 PROCEDURE — 25000132 ZZH RX MED GY IP 250 OP 250 PS 637: Performed by: PSYCHIATRY & NEUROLOGY

## 2019-07-15 PROCEDURE — 25000131 ZZH RX MED GY IP 250 OP 636 PS 637: Performed by: PSYCHIATRY & NEUROLOGY

## 2019-07-15 PROCEDURE — 12400000 ZZH R&B MH

## 2019-07-15 PROCEDURE — 90853 GROUP PSYCHOTHERAPY: CPT

## 2019-07-15 RX ORDER — VENLAFAXINE HYDROCHLORIDE 150 MG/1
150 CAPSULE, EXTENDED RELEASE ORAL
Status: DISCONTINUED | OUTPATIENT
Start: 2019-07-16 | End: 2019-07-19 | Stop reason: HOSPADM

## 2019-07-15 RX ORDER — QUETIAPINE FUMARATE 200 MG/1
200 TABLET, FILM COATED ORAL AT BEDTIME
Status: DISCONTINUED | OUTPATIENT
Start: 2019-07-15 | End: 2019-07-16

## 2019-07-15 RX ORDER — GABAPENTIN 100 MG/1
100 CAPSULE ORAL 2 TIMES DAILY
Status: DISCONTINUED | OUTPATIENT
Start: 2019-07-15 | End: 2019-07-19 | Stop reason: HOSPADM

## 2019-07-15 RX ADMIN — NICOTINE POLACRILEX 2 MG: 2 GUM, CHEWING ORAL at 19:48

## 2019-07-15 RX ADMIN — PREDNISONE 2 MG: 1 TABLET ORAL at 08:34

## 2019-07-15 RX ADMIN — QUETIAPINE FUMARATE 50 MG: 50 TABLET ORAL at 09:14

## 2019-07-15 RX ADMIN — HYDROXYZINE HYDROCHLORIDE 25 MG: 25 TABLET ORAL at 14:57

## 2019-07-15 RX ADMIN — VORTIOXETINE 5 MG: 5 TABLET, FILM COATED ORAL at 19:48

## 2019-07-15 RX ADMIN — HYDROXYZINE HYDROCHLORIDE 25 MG: 25 TABLET ORAL at 11:09

## 2019-07-15 RX ADMIN — GABAPENTIN 100 MG: 100 CAPSULE ORAL at 12:29

## 2019-07-15 RX ADMIN — GABAPENTIN 400 MG: 300 CAPSULE ORAL at 21:42

## 2019-07-15 RX ADMIN — VENLAFAXINE HYDROCHLORIDE 75 MG: 75 CAPSULE, EXTENDED RELEASE ORAL at 08:32

## 2019-07-15 RX ADMIN — QUETIAPINE FUMARATE 200 MG: 200 TABLET ORAL at 22:15

## 2019-07-15 RX ADMIN — UMECLIDINIUM 1 PUFF: 62.5 AEROSOL, POWDER ORAL at 08:33

## 2019-07-15 RX ADMIN — NICOTINE POLACRILEX 2 MG: 2 GUM, CHEWING ORAL at 15:00

## 2019-07-15 RX ADMIN — NICOTINE POLACRILEX 2 MG: 2 GUM, CHEWING ORAL at 09:14

## 2019-07-15 RX ADMIN — FLUTICASONE FUROATE AND VILANTEROL TRIFENATATE 1 PUFF: 100; 25 POWDER RESPIRATORY (INHALATION) at 08:33

## 2019-07-15 RX ADMIN — GABAPENTIN 100 MG: 100 CAPSULE ORAL at 16:02

## 2019-07-15 RX ADMIN — NICOTINE 1 PATCH: 21 PATCH, EXTENDED RELEASE TRANSDERMAL at 08:31

## 2019-07-15 ASSESSMENT — ACTIVITIES OF DAILY LIVING (ADL)
LAUNDRY: WITH SUPERVISION
DRESS: STREET CLOTHES
ORAL_HYGIENE: INDEPENDENT
HYGIENE/GROOMING: INDEPENDENT

## 2019-07-15 NOTE — PROGRESS NOTES
"[facility] Psychiatric Progress Note      Interval History:   Pt seen, team meeting held with , nursing staff, OT, and PA's to review diagnosis and treatment plan. Patient reports she is still feeling very anxious because \"people have been saying I am not hearing voices and now I am worried that I will start hearing voices when I get out of here.\"  She appears very tremulous and internally preoccupied even though she reports absence of auditory or visual hallucinations.  She tells me she benefits from the use of Seroquel as needed for her breakthrough anxiety.  We discussed the potential benefit of using gabapentin during the day at a low dose to address her anxiety.  She denies any self-harm thoughts plans or intent.  He is looking     Review of systems:   The Review of Systems is negative other than noted in the HPI     Medications:       fluticasone-vilanterol  1 puff Inhalation Daily     gabapentin  400 mg Oral At Bedtime     nicotine  1 patch Transdermal Daily     nicotine   Transdermal Q8H     nicotine   Transdermal Daily     predniSONE  2 mg Oral Daily    Followed by     [START ON 7/18/2019] predniSONE  1 mg Oral Daily     QUEtiapine  150 mg Oral At Bedtime     umeclidinium  1 puff Inhalation Daily     venlafaxine  75 mg Oral Daily with breakfast     vortioxetine  5 mg Oral Daily at 8 pm     hydrOXYzine, nicotine, QUEtiapine    Mental Status Examination:     Appearance:  awake, alert, adequately groomed and casually dressed  Eye Contact:  better  Speech:  clear, coherent  Psychomotor Behavior:  no evidence of tardive dyskinesia, dystonia, or tics and fidgeting  Mood: depressed and sad.  Affect:  Mood congruent with heightened intensity.  Thought Process:  logical, linear and goal oriented no loose associations  Thought Content:  no evidence of suicidal ideation or homicidal ideation.  Internally preoccupied but denies auditory or visual hallucinations.   Oriented to:  time, " person, and place  Attention Span and Concentration:  limited  Recent and Remote Memory:  limited  Fund of Knowledge: appropriate  Muscle Strength and Tone: normal  Gait and Station: Normal  Insight:  fair  Judgment:  limited          Labs/Vitals:   No results found for this or any previous visit (from the past 24 hour(s)).  B/P: 108/68, T: 98.6, P: 97, R: 16    Impression:   Eva Parikh is a 48-year-old woman with established history of major depressive disorder, who was just discharged from [facility] a couple weeks ago and presents at this time on account of worsening depression and suicidality.  She had not tolerated prescribed Cymbalta and had discontinued this medication.  She was started on Effexor and had been entertaining self-harm thoughts, hence hospitalization.       DIagnoses:     1.  Major depressive disorder, recurrent, severe without psychosis.  Rule out major depressive disorder, recurrent, severe with psychotic features.   2.  Chronic obstructive pulmonary disease.   3.  Asthma.   4.  Peripheral neuropathy.   5.  Chronic back pain.          Plan:   1. Written information given on medications. Side effects, risks, benefits reviewed.  2.  Increase Effexor XR to 150 mg daily and add gabapentin 100 mg p.o. twice daily at 8 AM and 2 PM.  Increase Seroquel to 200 mg nightly  3.  Continue hospitalization.      Attestation:  Patient has been seen and evaluated by me,  Magali Wilkins MD    PATIENT ID  Name: Eva Parikh  MRN:[id number]  YOB: 1970

## 2019-07-15 NOTE — PLAN OF CARE
Pt mood is anxious with tense affect. Impaired insight. Stated she feels 'ok' today. Attended groups and shared with prompting. Polite and cooperative with staff. Still shows a wide-eyed stare and nervous demeanor. Med compliant.

## 2019-07-15 NOTE — PLAN OF CARE
BEHAVIORAL TEAM DISCUSSION    Participants: MD, CM, RN, OT, PA  Progress: No Change  Continued Stay Criteria/Rationale: continued anxiety  Medical/Physical: None  Precautions:   Behavioral Orders   Procedures     Code 1 - Restrict to Unit     Routine Programming     As clinically indicated     Status 15     Every 15 minutes.     Plan: Medication management and adjustment, encourage groups  Rationale for change in precautions or plan: Medication management and adjustment to address anxiety

## 2019-07-15 NOTE — PLAN OF CARE
Pt presents with tense affect and anxious mood. Pt stated that she has been feeling very anxious, but less so when she is working on a puzzle in the [employer]. Pt denies SI at this time and has  poor insight into her situation and impaired judgement. Pt attended groups and participated appropriately.

## 2019-07-16 PROCEDURE — 12400000 ZZH R&B MH

## 2019-07-16 PROCEDURE — 25000132 ZZH RX MED GY IP 250 OP 250 PS 637: Performed by: PSYCHIATRY & NEUROLOGY

## 2019-07-16 PROCEDURE — 25000131 ZZH RX MED GY IP 250 OP 636 PS 637: Performed by: PSYCHIATRY & NEUROLOGY

## 2019-07-16 RX ADMIN — PREDNISONE 2 MG: 1 TABLET ORAL at 08:41

## 2019-07-16 RX ADMIN — NICOTINE POLACRILEX 2 MG: 2 GUM, CHEWING ORAL at 13:14

## 2019-07-16 RX ADMIN — HYDROXYZINE HYDROCHLORIDE 25 MG: 25 TABLET ORAL at 09:49

## 2019-07-16 RX ADMIN — QUETIAPINE FUMARATE 50 MG: 50 TABLET ORAL at 09:49

## 2019-07-16 RX ADMIN — VORTIOXETINE 5 MG: 5 TABLET, FILM COATED ORAL at 21:15

## 2019-07-16 RX ADMIN — VENLAFAXINE HYDROCHLORIDE 150 MG: 150 CAPSULE, EXTENDED RELEASE ORAL at 08:41

## 2019-07-16 RX ADMIN — NICOTINE POLACRILEX 2 MG: 2 GUM, CHEWING ORAL at 21:15

## 2019-07-16 RX ADMIN — GABAPENTIN 100 MG: 100 CAPSULE ORAL at 08:41

## 2019-07-16 RX ADMIN — NICOTINE 1 PATCH: 21 PATCH, EXTENDED RELEASE TRANSDERMAL at 08:42

## 2019-07-16 RX ADMIN — UMECLIDINIUM 1 PUFF: 62.5 AEROSOL, POWDER ORAL at 08:42

## 2019-07-16 RX ADMIN — QUETIAPINE FUMARATE 150 MG: 100 TABLET ORAL at 22:18

## 2019-07-16 RX ADMIN — FLUTICASONE FUROATE AND VILANTEROL TRIFENATATE 1 PUFF: 100; 25 POWDER RESPIRATORY (INHALATION) at 08:42

## 2019-07-16 RX ADMIN — GABAPENTIN 100 MG: 100 CAPSULE ORAL at 14:19

## 2019-07-16 RX ADMIN — GABAPENTIN 400 MG: 300 CAPSULE ORAL at 21:15

## 2019-07-16 RX ADMIN — NICOTINE POLACRILEX 2 MG: 2 GUM, CHEWING ORAL at 17:11

## 2019-07-16 ASSESSMENT — MIFFLIN-ST. JEOR: SCORE: 1482.71

## 2019-07-16 NOTE — PLAN OF CARE
"Pt denied SI. Pt c/o poor sleep r/t frequently awaking last night. Pt said, \"I think it's the increase in Seroquel that kept me awake.\" Pt has been isolated to her room napping this morning.' Pt was compliant to a move to a single room as her c-pap kept her room mate awake. Pt is soft spoken, took PRN medication for anxiety once this morning.Affect is blunted brightens during conversation. Mood appears outwardly calm. Pt is cooperative.  "

## 2019-07-16 NOTE — PROGRESS NOTES
7/15/19 [facility]   Art Therapy   Type of Intervention structured groups   Response participates with encouragement   Hours 1   Treatment Detail Art Therapy- nature mandalas     Problem-  1.  Major depressive disorder, recurrent, severe without psychosis.  Rule out major depressive disorder, recurrent, severe with psychotic features.   2.  Chronic obstructive pulmonary disease.   3.  Asthma.   4.  Peripheral neuropathy.   5.  Chronic back pain.     Goal- cope, express, regulate emotions     Outcome- pt attended Art Therapy group for the full group and enjoyed the project. Her hands were shaking quite a bit, but she continued to do the work and didn't complain about that. She is quietly social and talked about enjoying the cookie making today and completing a puzzle, besides the nature mandala in group. She attended wrap up group as well. She is enjoying the peer support.

## 2019-07-16 NOTE — PLAN OF CARE
Pt presents with a tense affect and anxious mood. Pt attended all groups. She made an art project that she was proud of. Worked on a puzzle and finished it. Patient thinks her HS dosage is too much. Encouraged to talk to  in the AM.

## 2019-07-16 NOTE — PROGRESS NOTES
[facility] Psychiatric Progress Note      Interval History:   Pt seen, team meeting held with , nursing staff, OT, and PA's to review diagnosis and treatment plan.  Patient denies the presence of auditory or visual hallucinations.  She states she is not sleeping well at night presumably because her Seroquel dose was increased to 200 mg. She reports she has been having essential tremors for a long time but she feels it has gotten worse in the hospital. She says she wants to go home tomorrow but wants her Seroquel dose reduced to 150 mg. She denies self harm thoughts, plans or intent.     Review of systems:   The Review of Systems is negative other than noted in the HPI     Medications:       fluticasone-vilanterol  1 puff Inhalation Daily     gabapentin  100 mg Oral BID     gabapentin  400 mg Oral At Bedtime     nicotine  1 patch Transdermal Daily     nicotine   Transdermal Q8H     nicotine   Transdermal Daily     predniSONE  2 mg Oral Daily    Followed by     [START ON 7/18/2019] predniSONE  1 mg Oral Daily     QUEtiapine  200 mg Oral At Bedtime     umeclidinium  1 puff Inhalation Daily     venlafaxine  150 mg Oral Daily with breakfast     vortioxetine  5 mg Oral Daily at 8 pm     hydrOXYzine, nicotine, QUEtiapine    Mental Status Examination:     Appearance:  awake, alert, adequately groomed and casually dressed  Eye Contact:  better  Speech:  clear, coherent  Psychomotor Behavior:  no evidence of tardive dyskinesia, dystonia, or tics and fidgeting. Tremulous  Mood: Better  Affect:  Mood congruent with heightened intensity.  Thought Process:  logical, linear and goal oriented no loose associations  Thought Content:  no evidence of suicidal ideation or homicidal ideation.  Denies auditory or visual hallucinations.   Oriented to:  time, person, and place  Attention Span and Concentration:  limited  Recent and Remote Memory:  limited  Fund of Knowledge: appropriate  Muscle Strength and  Tone: normal  Gait and Station: Normal  Insight:  fair  Judgment:  limited          Labs/Vitals:   No results found for this or any previous visit (from the past 24 hour(s)).  B/P: 108/68, T: 98.6, P: 97, R: 16    Impression:   Eva Parikh is a 48-year-old woman with established history of major depressive disorder, who was just discharged from [facility] a couple weeks ago and presents at this time on account of worsening depression and suicidality.  She had not tolerated prescribed Cymbalta and had discontinued this medication.  She was started on Effexor and had been entertaining self-harm thoughts, hence hospitalization.       DIagnoses:     1.  Major depressive disorder, recurrent, severe without psychosis.  Rule out major depressive disorder, recurrent, severe with psychotic features.   2.  Chronic obstructive pulmonary disease.   3.  Asthma.   4.  Peripheral neuropathy.   5.  Chronic back pain.          Plan:   1. Written information given on medications. Side effects, risks, benefits reviewed.  2.  Reduce Seroquel to 150 mg qhs at patient's insistence.  3.  Continue hospitalization.      Attestation:  Patient has been seen and evaluated by Magali varela MD    PATIENT ID  Name: Eva Parikh  MRN:[id number]  YOB: 1970

## 2019-07-17 PROCEDURE — 25000132 ZZH RX MED GY IP 250 OP 250 PS 637: Performed by: PSYCHIATRY & NEUROLOGY

## 2019-07-17 PROCEDURE — 12400000 ZZH R&B MH

## 2019-07-17 PROCEDURE — 90853 GROUP PSYCHOTHERAPY: CPT

## 2019-07-17 PROCEDURE — 25000131 ZZH RX MED GY IP 250 OP 636 PS 637: Performed by: PSYCHIATRY & NEUROLOGY

## 2019-07-17 RX ORDER — ATENOLOL 25 MG/1
12.5 TABLET ORAL DAILY
Status: DISCONTINUED | OUTPATIENT
Start: 2019-07-17 | End: 2019-07-18

## 2019-07-17 RX ADMIN — FLUTICASONE FUROATE AND VILANTEROL TRIFENATATE 1 PUFF: 100; 25 POWDER RESPIRATORY (INHALATION) at 08:51

## 2019-07-17 RX ADMIN — QUETIAPINE FUMARATE 150 MG: 100 TABLET ORAL at 21:52

## 2019-07-17 RX ADMIN — GABAPENTIN 100 MG: 100 CAPSULE ORAL at 14:03

## 2019-07-17 RX ADMIN — GABAPENTIN 400 MG: 300 CAPSULE ORAL at 20:36

## 2019-07-17 RX ADMIN — NICOTINE POLACRILEX 2 MG: 2 GUM, CHEWING ORAL at 20:37

## 2019-07-17 RX ADMIN — PREDNISONE 2 MG: 1 TABLET ORAL at 08:45

## 2019-07-17 RX ADMIN — VENLAFAXINE HYDROCHLORIDE 150 MG: 150 CAPSULE, EXTENDED RELEASE ORAL at 08:46

## 2019-07-17 RX ADMIN — NICOTINE POLACRILEX 2 MG: 2 GUM, CHEWING ORAL at 14:03

## 2019-07-17 RX ADMIN — GABAPENTIN 100 MG: 100 CAPSULE ORAL at 08:44

## 2019-07-17 RX ADMIN — NICOTINE POLACRILEX 2 MG: 2 GUM, CHEWING ORAL at 09:37

## 2019-07-17 RX ADMIN — UMECLIDINIUM 1 PUFF: 62.5 AEROSOL, POWDER ORAL at 08:52

## 2019-07-17 RX ADMIN — VORTIOXETINE 5 MG: 5 TABLET, FILM COATED ORAL at 20:36

## 2019-07-17 RX ADMIN — ATENOLOL 12.5 MG: 25 TABLET ORAL at 18:02

## 2019-07-17 RX ADMIN — HYDROXYZINE HYDROCHLORIDE 25 MG: 25 TABLET ORAL at 12:23

## 2019-07-17 RX ADMIN — NICOTINE 1 PATCH: 21 PATCH, EXTENDED RELEASE TRANSDERMAL at 08:46

## 2019-07-17 ASSESSMENT — ACTIVITIES OF DAILY LIVING (ADL)
HYGIENE/GROOMING: INDEPENDENT
DRESS: STREET CLOTHES
DRESS: INDEPENDENT
LAUNDRY: WITH SUPERVISION
ORAL_HYGIENE: INDEPENDENT
HYGIENE/GROOMING: INDEPENDENT
ORAL_HYGIENE: INDEPENDENT

## 2019-07-17 NOTE — PLAN OF CARE
Pt visible in common areas this evening; less withdrawn; appears less anxious than yesterday. Pt started atenolol. Pt has been working on puzzles and Sudoku. Pt denies suicidal thoughts. When possibility of discharge tomorrow was mentioned, pt affirmed feels more ready.

## 2019-07-17 NOTE — PROGRESS NOTES
[facility] Psychiatric Progress Note      Interval History:   Pt seen, team meeting held with , nursing staff, OT, and PA's to review diagnosis and treatment plan. Patient reports that she is still very tremulous and unable to decide that she is ready for discharge today. She says she does not want to discharge home and have to come back to the hospital. She says her son wants her home but she cannot guarantee that she is ready for discharge. She says she is open to trying a medication for her tremors. She denies self harm thoughts, plans or intent. She was advised that she cannot be placed on Inderal as earlier discussed because of her history of asthma. She will therefore be offered atenolol. Denies suicidal or homicidal ideation.     Review of systems:   The Review of Systems is negative other than noted in the HPI     Medications:       atenolol  12.5 mg Oral Daily     fluticasone-vilanterol  1 puff Inhalation Daily     gabapentin  100 mg Oral BID     gabapentin  400 mg Oral At Bedtime     nicotine  1 patch Transdermal Daily     nicotine   Transdermal Q8H     nicotine   Transdermal Daily     [START ON 7/18/2019] predniSONE  1 mg Oral Daily     QUEtiapine  150 mg Oral At Bedtime     umeclidinium  1 puff Inhalation Daily     venlafaxine  150 mg Oral Daily with breakfast     vortioxetine  5 mg Oral Daily at 8 pm     hydrOXYzine, nicotine, QUEtiapine    Mental Status Examination:     Appearance:  awake, alert, adequately groomed and casually dressed  Eye Contact:  better  Speech:  clear, coherent  Psychomotor Behavior:  no evidence of tardive dyskinesia, dystonia, or tics and fidgeting. Tremulous++  Mood: Anxious  Affect:  Mood congruent with heightened intensity.  Thought Process:  logical, linear and goal oriented no loose associations  Thought Content:  no evidence of suicidal ideation or homicidal ideation.  Denies auditory or visual hallucinations.   Oriented to:  time, person, and  place  Attention Span and Concentration:  limited  Recent and Remote Memory:  limited  Fund of Knowledge: appropriate  Muscle Strength and Tone: normal  Gait and Station: Normal  Insight:  fair  Judgment:  limited          Labs/Vitals:   No results found for this or any previous visit (from the past 24 hour(s)).  B/P: 108/68, T: 98.6, P: 97, R: 16    Impression:   Eva Parikh is a 48-year-old woman with established history of major depressive disorder, who was just discharged from [facility] a couple weeks ago and presents at this time on account of worsening depression and suicidality.  She had not tolerated prescribed Cymbalta and had discontinued this medication.  She was started on Effexor and had been entertaining self-harm thoughts, hence hospitalization.       DIagnoses:     1.  Major depressive disorder, recurrent, severe without psychosis.  Rule out major depressive disorder, recurrent, severe with psychotic features.   2.  Chronic obstructive pulmonary disease.   3.  Asthma.   4.  Peripheral neuropathy.   5.  Chronic back pain.          Plan:   1. Written information given on medications. Side effects, risks, benefits reviewed.  2.  Add Atenolol 12.5 mg daily.  3.  Continue hospitalization.      Attestation:  Patient has been seen and evaluated by Magali varela MD    PATIENT ID  Name: Eva Parikh  MRN:[id number]  YOB: 1970

## 2019-07-17 NOTE — PLAN OF CARE
Pt presented with a flat - blunt affect and anxious - calm mood. Pt's thinking process is impaired, insight is poor and exhibited appropriate behavior. Spent time working on a puzzle with a peer and watching TV. Pt attended groups, denied SI, ate her meal and was med compliant.

## 2019-07-17 NOTE — PLAN OF CARE
"Pt  attended 2 of 2 OT groups today. Pt transitioned to OT group with MIN encouragement. Pt engaged in therapeutic activity addressing functional cognition and interpersonal skills with task set up and extended time. With direct VCs, pt participated in therapeutic group activity and discussion addressing wellness. Pt ID'd one positive about herself (\"I'm nice), an activity to help her relax (knitting), and a trigger to illness symptoms as worries. Additionally, pt participated in a mindfulness group focusing on reduction of anxiety, improvement of mood, and increase in concentration. The mindfulness practice was offered via supportive approaches including breathing techniques, seated mindfulness practice, and mindful listening. Pt will continue to benefit from OT intervention to address implementation of positive functional coping skills, role performance, and community reintegration.     "

## 2019-07-17 NOTE — PLAN OF CARE
Pt presents with flat blunt affect and tense mood. Pt attended group and is visible on the unit but withdrawn from peers. States she wants to go home but does not feel ready. Med compliant, denies SI.

## 2019-07-18 PROCEDURE — 25000132 ZZH RX MED GY IP 250 OP 250 PS 637: Performed by: PSYCHIATRY & NEUROLOGY

## 2019-07-18 PROCEDURE — 90853 GROUP PSYCHOTHERAPY: CPT

## 2019-07-18 PROCEDURE — 25000131 ZZH RX MED GY IP 250 OP 636 PS 637: Performed by: PSYCHIATRY & NEUROLOGY

## 2019-07-18 PROCEDURE — 12400000 ZZH R&B MH

## 2019-07-18 RX ORDER — ATENOLOL 25 MG/1
12.5 TABLET ORAL 2 TIMES DAILY
Status: DISCONTINUED | OUTPATIENT
Start: 2019-07-18 | End: 2019-07-19 | Stop reason: HOSPADM

## 2019-07-18 RX ADMIN — ATENOLOL 12.5 MG: 25 TABLET ORAL at 20:09

## 2019-07-18 RX ADMIN — QUETIAPINE FUMARATE 150 MG: 100 TABLET ORAL at 22:37

## 2019-07-18 RX ADMIN — GABAPENTIN 400 MG: 300 CAPSULE ORAL at 20:15

## 2019-07-18 RX ADMIN — QUETIAPINE FUMARATE 50 MG: 50 TABLET ORAL at 17:56

## 2019-07-18 RX ADMIN — UMECLIDINIUM 1 PUFF: 62.5 AEROSOL, POWDER ORAL at 08:48

## 2019-07-18 RX ADMIN — VENLAFAXINE HYDROCHLORIDE 150 MG: 150 CAPSULE, EXTENDED RELEASE ORAL at 08:46

## 2019-07-18 RX ADMIN — FLUTICASONE FUROATE AND VILANTEROL TRIFENATATE 1 PUFF: 100; 25 POWDER RESPIRATORY (INHALATION) at 08:48

## 2019-07-18 RX ADMIN — NICOTINE POLACRILEX 2 MG: 2 GUM, CHEWING ORAL at 10:26

## 2019-07-18 RX ADMIN — GABAPENTIN 100 MG: 100 CAPSULE ORAL at 08:46

## 2019-07-18 RX ADMIN — NICOTINE POLACRILEX 2 MG: 2 GUM, CHEWING ORAL at 16:34

## 2019-07-18 RX ADMIN — GABAPENTIN 100 MG: 100 CAPSULE ORAL at 13:14

## 2019-07-18 RX ADMIN — HYDROXYZINE HYDROCHLORIDE 25 MG: 25 TABLET ORAL at 13:14

## 2019-07-18 RX ADMIN — PREDNISONE 1 MG: 1 TABLET ORAL at 08:48

## 2019-07-18 RX ADMIN — ATENOLOL 12.5 MG: 25 TABLET ORAL at 08:46

## 2019-07-18 RX ADMIN — QUETIAPINE FUMARATE 50 MG: 50 TABLET ORAL at 14:21

## 2019-07-18 RX ADMIN — NICOTINE 1 PATCH: 21 PATCH, EXTENDED RELEASE TRANSDERMAL at 08:45

## 2019-07-18 RX ADMIN — HYDROXYZINE HYDROCHLORIDE 25 MG: 25 TABLET ORAL at 09:32

## 2019-07-18 ASSESSMENT — ACTIVITIES OF DAILY LIVING (ADL)
LAUNDRY: WITH SUPERVISION
ORAL_HYGIENE: INDEPENDENT
ORAL_HYGIENE: INDEPENDENT
HYGIENE/GROOMING: INDEPENDENT
HYGIENE/GROOMING: INDEPENDENT
DRESS: INDEPENDENT
DRESS: STREET CLOTHES
LAUNDRY: WITH SUPERVISION

## 2019-07-18 NOTE — PLAN OF CARE
Pt still has tense ,anxious affect ,but states this AM she is ok. Attending groups, and participates minimally. When MD asked if she was ready for discharge she stated no she could not go. Trintellix discontinued. Will continue to assess pt's level of anxiety. Pt will stay through the weekend.

## 2019-07-18 NOTE — PROGRESS NOTES
[facility] Psychiatric Progress Note      Interval History:   Pt seen, team meeting held with , nursing staff, OT, and PA's to review diagnosis and treatment plan. Patient reports that  She feels very sad and tremulous and does not feel good enough to discharge from the hospital. She reports she is very restless and despondent. We discussed adjusting her atenolol dose and stopping Trintellix. She was advised to remain on admission over the weekend and she states she wants to discharge tomorrow. Denies suicidal or homicidal ideation.     Review of systems:   The Review of Systems is negative other than noted in the HPI     Medications:       atenolol  12.5 mg Oral Daily     fluticasone-vilanterol  1 puff Inhalation Daily     gabapentin  100 mg Oral BID     gabapentin  400 mg Oral At Bedtime     nicotine  1 patch Transdermal Daily     nicotine   Transdermal Q8H     nicotine   Transdermal Daily     predniSONE  1 mg Oral Daily     QUEtiapine  150 mg Oral At Bedtime     umeclidinium  1 puff Inhalation Daily     venlafaxine  150 mg Oral Daily with breakfast     hydrOXYzine, nicotine, QUEtiapine    Mental Status Examination:     Appearance:  awake, alert, adequately groomed and casually dressed  Eye Contact:  better  Speech:  clear, coherent  Psychomotor Behavior:  no evidence of tardive dyskinesia, dystonia, or tics and fidgeting. Tremulous++  Mood: Anxious  Affect:  Mood congruent with heightened intensity.  Thought Process:  logical, linear and goal oriented no loose associations  Thought Content:  no evidence of suicidal ideation or homicidal ideation.  Denies auditory or visual hallucinations.   Oriented to:  time, person, and place  Attention Span and Concentration:  limited  Recent and Remote Memory:  limited  Fund of Knowledge: appropriate  Muscle Strength and Tone: normal  Gait and Station: Normal  Insight:  fair  Judgment:  limited          Labs/Vitals:   No results found for this or  any previous visit (from the past 24 hour(s)).  B/P: 108/68, T: 98.6, P: 97, R: 16    Impression:   Eva Parikh is a 48-year-old woman with established history of major depressive disorder, who was just discharged from [facility] a couple weeks ago and presents at this time on account of worsening depression and suicidality.  She had not tolerated prescribed Cymbalta and had discontinued this medication.  She was started on Effexor and had been entertaining self-harm thoughts, hence hospitalization.       DIagnoses:     1.  Major depressive disorder, recurrent, severe without psychosis.   2.  Chronic obstructive pulmonary disease.   3.  Asthma.   4.  Peripheral neuropathy.   5.  Chronic back pain.          Plan:   1. Written information given on medications. Side effects, risks, benefits reviewed.  2.  Stop Trintellix. Increase Atenolol 12.5 mg twice daily.  3.  Continue hospitalization.      Attestation:  Patient has been seen and evaluated by Magali varela MD    PATIENT ID  Name: Eva Parikh  MRN:[id number]  YOB: 1970

## 2019-07-18 NOTE — PLAN OF CARE
BEHAVIORAL TEAM DISCUSSION    Participants: MD,RN,CM,PA,OT  Progress: Improving- is participating to good effect in groups. Becomes visibly anxious when discussing discharge.  Continued Stay Criteria/Rationale: Medication adjustment  Medical/Physical: NA  Precautions: Code1  Behavioral Orders   Procedures    Code 1 - Restrict to Unit    Routine Programming     As clinically indicated    Status 15     Every 15 minutes.     Plan: Work on anxiety management skills.Discharge follow up appointments in place.  Rationale for change in precautions or plan: NA

## 2019-07-18 NOTE — PROGRESS NOTES
Discussed with patient that her appointments are in place. Asked if she had completed her safety plan. She reported she has worked on it- mostly completed. Then inquired about her plans for returning home. She is expecting hospital to cab her home. Discussed that cab fare home is not paid by hospital. Staff can help map out a bus/train home or she can call on a relative or friend. She said the only person is her son and he doesn't know his way beyond [place]. Patient has a [employer] and simple route was found to get her from hospital door to [place] [employer] where son could pick her up. She may also pay by credit card for cab home. KHRIS Was helping her explore [employer] as a less expensive ride home.

## 2019-07-18 NOTE — PLAN OF CARE
Pt transitioned to OT group with MIN encouragement and participated in therapeutic group activity and discussion addressing self-concept. Educated pt on benefits of positive self-concepts and methods to develop same. With prompting and extended time, pt responded to direct questions, but did not contribute to group spontaneously. Pt appeared anxious and tremulous throughout group. Pt will continue to benefit from OT intervention to address implementation of positive functional coping skills, role performance, and community reintegration.

## 2019-07-19 VITALS
TEMPERATURE: 98.4 F | SYSTOLIC BLOOD PRESSURE: 101 MMHG | DIASTOLIC BLOOD PRESSURE: 57 MMHG | RESPIRATION RATE: 16 BRPM | BODY MASS INDEX: 33.03 KG/M2 | HEIGHT: 64 IN | HEART RATE: 85 BPM | WEIGHT: 193.5 LBS | OXYGEN SATURATION: 92 %

## 2019-07-19 PROCEDURE — 25000132 ZZH RX MED GY IP 250 OP 250 PS 637: Performed by: PSYCHIATRY & NEUROLOGY

## 2019-07-19 PROCEDURE — 25000131 ZZH RX MED GY IP 250 OP 636 PS 637: Performed by: PSYCHIATRY & NEUROLOGY

## 2019-07-19 PROCEDURE — 90853 GROUP PSYCHOTHERAPY: CPT

## 2019-07-19 RX ORDER — VENLAFAXINE HYDROCHLORIDE 150 MG/1
150 CAPSULE, EXTENDED RELEASE ORAL
Qty: 30 CAPSULE | Refills: 0 | Status: SHIPPED | OUTPATIENT
Start: 2019-07-20 | End: 2023-08-26

## 2019-07-19 RX ORDER — QUETIAPINE FUMARATE 50 MG/1
150 TABLET, FILM COATED ORAL AT BEDTIME
Qty: 90 TABLET | Refills: 0 | Status: SHIPPED | OUTPATIENT
Start: 2019-07-19 | End: 2023-08-26

## 2019-07-19 RX ORDER — ATENOLOL 25 MG/1
12.5 TABLET ORAL 2 TIMES DAILY
Qty: 30 TABLET | Refills: 0 | Status: SHIPPED | OUTPATIENT
Start: 2019-07-19 | End: 2023-08-26

## 2019-07-19 RX ORDER — PREDNISONE 1 MG/1
1 TABLET ORAL DAILY
Qty: 3 TABLET | Refills: 0 | Status: SHIPPED | OUTPATIENT
Start: 2019-07-20 | End: 2019-07-23

## 2019-07-19 RX ORDER — GABAPENTIN 100 MG/1
100 CAPSULE ORAL 2 TIMES DAILY
Qty: 60 CAPSULE | Refills: 0 | Status: SHIPPED | OUTPATIENT
Start: 2019-07-19 | End: 2019-08-18

## 2019-07-19 RX ADMIN — QUETIAPINE FUMARATE 50 MG: 50 TABLET ORAL at 11:17

## 2019-07-19 RX ADMIN — GABAPENTIN 100 MG: 100 CAPSULE ORAL at 09:03

## 2019-07-19 RX ADMIN — UMECLIDINIUM 1 PUFF: 62.5 AEROSOL, POWDER ORAL at 09:08

## 2019-07-19 RX ADMIN — HYDROXYZINE HYDROCHLORIDE 25 MG: 25 TABLET ORAL at 11:17

## 2019-07-19 RX ADMIN — QUETIAPINE FUMARATE 50 MG: 50 TABLET ORAL at 09:03

## 2019-07-19 RX ADMIN — ATENOLOL 12.5 MG: 25 TABLET ORAL at 09:02

## 2019-07-19 RX ADMIN — PREDNISONE 1 MG: 1 TABLET ORAL at 09:04

## 2019-07-19 RX ADMIN — NICOTINE POLACRILEX 2 MG: 2 GUM, CHEWING ORAL at 09:26

## 2019-07-19 RX ADMIN — FLUTICASONE FUROATE AND VILANTEROL TRIFENATATE 1 PUFF: 100; 25 POWDER RESPIRATORY (INHALATION) at 09:03

## 2019-07-19 RX ADMIN — NICOTINE 1 PATCH: 21 PATCH, EXTENDED RELEASE TRANSDERMAL at 09:03

## 2019-07-19 RX ADMIN — NICOTINE POLACRILEX 2 MG: 2 GUM, CHEWING ORAL at 12:54

## 2019-07-19 RX ADMIN — VENLAFAXINE HYDROCHLORIDE 150 MG: 150 CAPSULE, EXTENDED RELEASE ORAL at 09:02

## 2019-07-19 ASSESSMENT — ACTIVITIES OF DAILY LIVING (ADL)
ORAL_HYGIENE: INDEPENDENT
LAUNDRY: WITH SUPERVISION
HYGIENE/GROOMING: INDEPENDENT
DRESS: INDEPENDENT

## 2019-07-19 NOTE — DISCHARGE INSTRUCTIONS
Behavioral Discharge Planning and Instructions    Summary: Admitted due to suicidal thoughts and worsening depression.     Main Diagnosis: Major Depressive Disorder, recurrent, severe without psychosis.  Rule out Major Depressive Disorder, recurrent, severe with psychotic features    Major Treatments, Procedures and Findings:  Psychiatric assessment. Medication adjustment.       Symptoms to Report: Feeling more anxious or agitated,  Losing more sleep or sleeping too much, Mood getting worse or Thoughts of suicide    Lifestyle Adjustment:  Follow through on all treatment recommendations from you doctor and other care providers. Develop and follow safety plan. Your safety plan is your contract with yourself to keep you safe in a crisis. Be sure to use the resources and crisis numbers listed on your safety plan.     Psychiatry Follow-up:     [facility]  [address] [place] [address]  [phone]  Fax [phone]  Appointment with Bryce Pennington, therapist on Tuesday, July 23 at 11:00 AM                         With Salvatore Hinds DNP, CNP on Wednesday, July 31 at 8:30 AM      Resources:   Crisis Intervention: 916.204.6739 or [phone] (TTY: [phone]).  Call anytime for help.  National [place] on Mental Illness (www.mn.kash.org): 878.394.3312 or 048-809-0210.  National Suicide Prevention Line (www.mentalhealthmn.org): 221-181-AYFE (2620)  [facility] 24/7 crisis hotline for adults who are in the midst of a mental health or substance abuse crisis - 201.444.1910    General Medication Instructions:   See your medication sheet(s) for instructions.   Take all medicines as directed.  Make no changes unless your doctor suggests them.   Go to all your doctor visits.  Be sure to have all your required lab tests. This way, your medicines can be refilled on time.  Do not use any drugs not prescribed by your doctor.  Avoid alcohol.

## 2019-07-19 NOTE — PLAN OF CARE
Problem: Depressive Symptoms  Goal: Depressive Symptoms  Description  Signs and symptoms of listed problems will be absent or manageable.  7/18/2019 2208 by Jeff Ledesma  Outcome: No Change  Flowsheets  Taken 7/18/2019 1330 by Kristin oVss RN  Depressive Symptoms Assessed: all  Taken 7/14/2019 1332 by Kristin Voss RN  Depressive Symptoms Present: anxiety;affect;mood;insight;thought process;psychomotor activity  Note:   Pt presents with a blunt affect and an anxious mood. Pt spent the majority of the shift in the lounge or in her room resting. Pt attended all unit programming and participated appropriately. Pt was minimally social but pleasant. Pt stated that she still anxious about getting her transportation set up for discharge. Pt also state that she didn't want to stay over the weekend. Pt ate all of her food and was med compliant.

## 2019-07-19 NOTE — PLAN OF CARE
"Pt transitioned well to group session, which addressed illness management through therapeutic activity.  Given visual guide and MIN A, pt ID'd her main barriers to recovery. Pt ID'd her biggest barriers to recovery as worry  and depression which prevent her from \"being happy.\" With MOD A, pt ID'd potential solutions to addressing barrier (e.g.  Continued therapy and taking medications as prescribed, staying in the moment). Pt will continue to benefit from OT intervention to address implementation of positive functional coping skills, role performance, and community reintegration.     "

## 2019-07-19 NOTE — PLAN OF CARE
Pt's mood is calm with blunt affect and poor insight. Thought process is intact and pt is being discharge home with Son via Cab paid by pt.Pt escorted to door six by staff to catch the Cab.All appointPt left the unit with all prescribed medications. Pt was med compliant and attended groups

## 2019-07-28 NOTE — DISCHARGE SUMMARY
[facility]    Discharge Summary  Adult Psychiatry    Date of Admission:  7/10/2019  Date of Discharge:  7/19/2019  2:58 PM  Discharging Provider: Magali Wilkins MD  Date of Service (when I saw the patient): 07/19/19    Discharge Diagnoses   1.  Major depressive disorder, recurrent, severe without psychosis.   2.  Chronic obstructive pulmonary disease.   3.  Asthma.   4.  Peripheral neuropathy.   5.  Chronic back pain.     History of Present Illness   Eva Parikh is a 48-year-old woman who reports she resides in [place].  She states she has a son and she works for [employer] as a /, a job she has held for the last 12 years.  She sees providers at [facility] in [place] and was recently discharged on 06/26/2019 from [facility], inpatient mental health unit, on account of worsening depression.  She was referred to [facility] for hospitalization on account of worsening depression and thoughts of suicide. For more details, I refer the reader to the initial psychiatric assessment documented on 7/11/2019 by Magali Wilkins MD in addition to the history and physical examination documented by Joanna Kersten Ulmen Barthell, PA-C on 7/11/2019 at 1:57 PM.    Hospital Course   Eva Parikh was admitted on 7/10/2019.  She was encouraged to participate in individual, milieu, and group therapy.  Staff give her the opportunity to ventilate her stressors and her concerns were validated.  She indicated that she was on very large to address her tobacco use disorder on account of which this was discontinued and she was offered an Trintellix in combination with her newly started Effexor while quetiapine was maintained at 150 mg at bedtime.  She was also maintained on low-dose prednisone at 2 mg daily and gabapentin was maintained at 400 mg at bedtime.  She was seen by the hospitalist who encouraged her to consider tobacco cessation  "therapy while maintaining her prednisone taper as prescribed.  During group sessions, she was noted to be anxious but displayed ability to communicate her needs albeit with some problems.  Her verbal content was appropriate to topic and she was able to maintain appropriate physical and verbal boundaries.  She had good recall of recent and remote events.  On account of her tremulousness, she was offered cardioselective atenolol which she tolerated without any adverse effects.  She attended Art Therapy group for the full group and enjoyed the project. Her hands were shaking quite a bit, but she continued to do the work and didn't complain about that. She is quietly social and talked about enjoying the cookie making and completing a puzzle, besides the nature mandala in group. She attended wrap up group as well. She is enjoying the peer support.  She continued to enjoy participation in group therapy and patient  transitioned well to group session, which addressed illness management through therapeutic activity.  Given visual guide and MIN A, pt ID'd her main barriers to recovery. Pt ID'd her biggest barriers to recovery as worry  and depression which prevent her from \"being happy.\" With MOD A, pt ID'd potential solutions to addressing barrier (e.g.  Continued therapy and taking medications as prescribed, staying in the moment).  She soon began to feel more stable on account of which she requested to be discharged from the hospital on 7/19/2019.  At the point of discharge she denied experiencing any self-harm thoughts plans or intent and reported future orientation.     Magali Wilkins MD    Significant Results and Procedures   Please see below.    Unresulted Labs Ordered in the Past 30 Days of this Admission     No orders found from 6/10/2019 to 7/11/2019.          Code Status   Full Code    Primary Care Physician   Reymundo Avila    Physical Exam   Appearance:  awake, alert, adequately groomed and casually " dressed  Attitude:  cooperative  Eye Contact:  good  Mood:  good  Affect:  appropriate and in normal range and mood congruent  Speech:  clear, coherent and normal prosody  Psychomotor Behavior:  no evidence of tardive dyskinesia, dystonia, or tics and intact station, gait and muscle tone  Thought Process:  logical, linear and goal oriented  Associations:  no loose associations  Thought Content:  no evidence of suicidal ideation or homicidal ideation and no evidence of psychotic thought  Insight:  good  Judgment:  intact  Oriented to:  time, person, and place  Attention Span and Concentration:  intact  Recent and Remote Memory:  intact  Language: Able to name objects and Able to repeat phrases  Fund of Knowledge: appropriate  Muscle Strength and Tone: normal  Gait and Station: Normal    Time Spent on this Encounter   IMagali, personally saw the patient today and spent greater than 30 minutes discharging this patient.    Discharge Disposition   Discharged to home  Condition at discharge: Stable    Psychiatry Follow-up:     [address]  [phone]  Fax [phone]  Appointment with Bryce Pennington, therapist on Tuesday, July 23 at 11:00 AM                         With Salvatore Hinds DNP, CNP on Wednesday, July 31 at 8:30 AM      Consultations This Hospital Stay   HOSPITALIST IP CONSULT    Discharge Orders   No discharge procedures on file.  Discharge Medications   Discharge Medication List as of 7/19/2019 12:59 PM      START taking these medications    Details   atenolol (TENORMIN) 25 MG tablet Take 0.5 tablets (12.5 mg) by mouth 2 times daily, Disp-30 tablet, R-0, E-Prescribe      !! gabapentin (NEURONTIN) 100 MG capsule Take 1 capsule (100 mg) by mouth 2 times daily, Disp-60 capsule, R-0, E-Prescribe      venlafaxine (EFFEXOR-XR) 150 MG 24 hr capsule Take 1 capsule (150 mg) by mouth daily (with breakfast), Disp-30 capsule, R-0, E-Prescribe        !! - Potential duplicate medications found. Please discuss with provider.      CONTINUE these medications which have CHANGED    Details   predniSONE (DELTASONE) 1 MG tablet Take 1 tablet (1 mg) by mouth daily for 3 days, Disp-3 tablet, R-0, E-Prescribe      QUEtiapine (SEROQUEL) 50 MG tablet Take 3 tablets (150 mg) by mouth At Bedtime, Disp-90 tablet, R-0, E-Prescribe         CONTINUE these medications which have NOT CHANGED    Details   Fluticasone-Umeclidin-Vilanterol (TRELEGY ELLIPTA) 100-62.5-25 MCG/INH oral inhaler Inhale 1 puff into the lungs daily, Historical      !! gabapentin (NEURONTIN) 400 MG capsule Take 400 mg by mouth At Bedtime, Historical      nicotine (NICODERM CQ) 21 MG/24HR 24 hr patch Place 1 patch onto the skin every 24 hours, Historical      nicotine (NICORETTE) 4 MG lozenge Place 4 mg inside cheek as needed for smoking cessation, Historical       !! - Potential duplicate medications found. Please discuss with provider.      STOP taking these medications       cariprazine (VRAYLAR) 3 MG CAPS capsule Comments:   Reason for Stopping:         DULoxetine (CYMBALTA) 30 MG capsule Comments:   Reason for Stopping:         venlafaxine (EFFEXOR) 37.5 MG tablet Comments:   Reason for Stopping:             Allergies   No Known Allergies  Data   Most Recent 3 CBC's:  Recent Labs   Lab Test 07/10/19  1720   WBC 9.6   HGB 15.4   MCV 90         Most Recent 3 BMP's:  Recent Labs   Lab Test 07/10/19  1720      POTASSIUM 4.3   CHLORIDE 106   CO2 29   BUN 15   CR 0.74   ANIONGAP 7   KANU 9.1   *     Most Recent 2 LFT's:No lab results found.   Panel:No lab results found.  Most Recent 6 Bacteria Isolates From Any Culture (See EPIC Reports for Culture Details):No lab results found.  Most Recent TSH, T4 and A1c Labs:  Recent Labs   Lab Test 07/10/19  1720   TSH 0.62     No results found for this or any previous visit.

## 2019-11-06 ENCOUNTER — COMMUNICATION - HEALTHEAST (OUTPATIENT)
Dept: FAMILY MEDICINE | Facility: CLINIC | Age: 49
End: 2019-11-06

## 2020-01-03 ENCOUNTER — AMBULATORY - HEALTHEAST (OUTPATIENT)
Dept: MEDSURG UNIT | Facility: HOSPITAL | Age: 50
End: 2020-01-03

## 2020-01-08 ENCOUNTER — RECORDS - HEALTHEAST (OUTPATIENT)
Dept: ADMINISTRATIVE | Facility: OTHER | Age: 50
End: 2020-01-08

## 2020-01-10 ENCOUNTER — RECORDS - HEALTHEAST (OUTPATIENT)
Dept: ADMINISTRATIVE | Facility: OTHER | Age: 50
End: 2020-01-10

## 2020-01-10 ENCOUNTER — AMBULATORY - HEALTHEAST (OUTPATIENT)
Dept: PULMONOLOGY | Facility: OTHER | Age: 50
End: 2020-01-10

## 2020-01-10 DIAGNOSIS — J44.9 COPD (CHRONIC OBSTRUCTIVE PULMONARY DISEASE) (H): ICD-10-CM

## 2020-02-21 ENCOUNTER — RECORDS - HEALTHEAST (OUTPATIENT)
Dept: PULMONOLOGY | Facility: OTHER | Age: 50
End: 2020-02-21

## 2020-02-21 ENCOUNTER — RECORDS - HEALTHEAST (OUTPATIENT)
Dept: ADMINISTRATIVE | Facility: OTHER | Age: 50
End: 2020-02-21

## 2020-02-21 ENCOUNTER — OFFICE VISIT - HEALTHEAST (OUTPATIENT)
Dept: PULMONOLOGY | Facility: OTHER | Age: 50
End: 2020-02-21

## 2020-02-21 DIAGNOSIS — R06.09 DYSPNEA ON EXERTION: ICD-10-CM

## 2020-02-21 DIAGNOSIS — G47.33 OSA (OBSTRUCTIVE SLEEP APNEA): ICD-10-CM

## 2020-02-21 DIAGNOSIS — J41.0 SIMPLE CHRONIC BRONCHITIS (H): ICD-10-CM

## 2020-02-21 DIAGNOSIS — J44.9 CHRONIC OBSTRUCTIVE PULMONARY DISEASE, UNSPECIFIED (H): ICD-10-CM

## 2020-02-21 LAB — HGB BLD-MCNC: 14.2 G/DL

## 2020-02-21 ASSESSMENT — MIFFLIN-ST. JEOR: SCORE: 1533.95

## 2020-02-28 ENCOUNTER — RECORDS - HEALTHEAST (OUTPATIENT)
Dept: LAB | Facility: CLINIC | Age: 50
End: 2020-02-28

## 2020-02-28 LAB
ALBUMIN SERPL-MCNC: 3.5 G/DL (ref 3.5–5)
ALP SERPL-CCNC: 74 U/L (ref 45–120)
ALT SERPL W P-5'-P-CCNC: 16 U/L (ref 0–45)
ANION GAP SERPL CALCULATED.3IONS-SCNC: 8 MMOL/L (ref 5–18)
AST SERPL W P-5'-P-CCNC: 16 U/L (ref 0–40)
BILIRUB SERPL-MCNC: 0.2 MG/DL (ref 0–1)
BUN SERPL-MCNC: 13 MG/DL (ref 8–22)
CALCIUM SERPL-MCNC: 8.7 MG/DL (ref 8.5–10.5)
CHLORIDE BLD-SCNC: 107 MMOL/L (ref 98–107)
CHOLEST SERPL-MCNC: 144 MG/DL
CO2 SERPL-SCNC: 26 MMOL/L (ref 22–31)
CREAT SERPL-MCNC: 0.71 MG/DL (ref 0.6–1.1)
FASTING STATUS PATIENT QL REPORTED: ABNORMAL
GFR SERPL CREATININE-BSD FRML MDRD: >60 ML/MIN/1.73M2
GLUCOSE BLD-MCNC: 97 MG/DL (ref 70–125)
HDLC SERPL-MCNC: 41 MG/DL
LDLC SERPL CALC-MCNC: 89 MG/DL
POTASSIUM BLD-SCNC: 4.1 MMOL/L (ref 3.5–5)
PROT SERPL-MCNC: 5.9 G/DL (ref 6–8)
SODIUM SERPL-SCNC: 141 MMOL/L (ref 136–145)
TRIGL SERPL-MCNC: 72 MG/DL
VIT B12 SERPL-MCNC: 391 PG/ML (ref 213–816)

## 2020-03-01 ENCOUNTER — RECORDS - HEALTHEAST (OUTPATIENT)
Dept: ADMINISTRATIVE | Facility: OTHER | Age: 50
End: 2020-03-01

## 2020-07-20 ENCOUNTER — COMMUNICATION - HEALTHEAST (OUTPATIENT)
Dept: PULMONOLOGY | Facility: OTHER | Age: 50
End: 2020-07-20

## 2020-07-27 ENCOUNTER — COMMUNICATION - HEALTHEAST (OUTPATIENT)
Dept: PULMONOLOGY | Facility: OTHER | Age: 50
End: 2020-07-27

## 2020-08-12 ENCOUNTER — COMMUNICATION - HEALTHEAST (OUTPATIENT)
Dept: PULMONOLOGY | Facility: OTHER | Age: 50
End: 2020-08-12

## 2020-08-18 ENCOUNTER — COMMUNICATION - HEALTHEAST (OUTPATIENT)
Dept: PULMONOLOGY | Facility: OTHER | Age: 50
End: 2020-08-18

## 2020-09-09 ENCOUNTER — OFFICE VISIT - HEALTHEAST (OUTPATIENT)
Dept: PULMONOLOGY | Facility: OTHER | Age: 50
End: 2020-09-09

## 2020-09-09 DIAGNOSIS — G47.33 OSA (OBSTRUCTIVE SLEEP APNEA): ICD-10-CM

## 2020-09-09 DIAGNOSIS — J41.0 SIMPLE CHRONIC BRONCHITIS (H): ICD-10-CM

## 2020-09-09 ASSESSMENT — MIFFLIN-ST. JEOR: SCORE: 1533.95

## 2020-09-10 ENCOUNTER — AMBULATORY - HEALTHEAST (OUTPATIENT)
Dept: PULMONOLOGY | Facility: OTHER | Age: 50
End: 2020-09-10

## 2020-09-10 DIAGNOSIS — J41.0 SIMPLE CHRONIC BRONCHITIS (H): ICD-10-CM

## 2020-09-28 ENCOUNTER — AMBULATORY - HEALTHEAST (OUTPATIENT)
Dept: PULMONOLOGY | Facility: OTHER | Age: 50
End: 2020-09-28

## 2020-09-28 DIAGNOSIS — J41.0 SIMPLE CHRONIC BRONCHITIS (H): ICD-10-CM

## 2021-03-05 ENCOUNTER — OFFICE VISIT - HEALTHEAST (OUTPATIENT)
Dept: PULMONOLOGY | Facility: OTHER | Age: 51
End: 2021-03-05

## 2021-03-05 DIAGNOSIS — J41.0 SIMPLE CHRONIC BRONCHITIS (H): ICD-10-CM

## 2021-03-05 ASSESSMENT — MIFFLIN-ST. JEOR: SCORE: 1601.99

## 2021-03-24 ENCOUNTER — COMMUNICATION - HEALTHEAST (OUTPATIENT)
Dept: MULTI SPECIALTY CLINIC | Facility: CLINIC | Age: 51
End: 2021-03-24

## 2021-05-24 ENCOUNTER — RECORDS - HEALTHEAST (OUTPATIENT)
Dept: ADMINISTRATIVE | Facility: CLINIC | Age: 51
End: 2021-05-24

## 2021-06-02 ENCOUNTER — RECORDS - HEALTHEAST (OUTPATIENT)
Dept: ADMINISTRATIVE | Facility: CLINIC | Age: 51
End: 2021-06-02

## 2021-06-04 VITALS
RESPIRATION RATE: 20 BRPM | DIASTOLIC BLOOD PRESSURE: 80 MMHG | OXYGEN SATURATION: 93 % | WEIGHT: 207 LBS | BODY MASS INDEX: 35.34 KG/M2 | HEART RATE: 94 BPM | SYSTOLIC BLOOD PRESSURE: 118 MMHG | HEIGHT: 64 IN

## 2021-06-04 VITALS
RESPIRATION RATE: 12 BRPM | HEART RATE: 83 BPM | DIASTOLIC BLOOD PRESSURE: 72 MMHG | WEIGHT: 207 LBS | SYSTOLIC BLOOD PRESSURE: 112 MMHG | BODY MASS INDEX: 35.34 KG/M2 | HEIGHT: 64 IN | OXYGEN SATURATION: 95 %

## 2021-06-05 VITALS
BODY MASS INDEX: 37.9 KG/M2 | SYSTOLIC BLOOD PRESSURE: 110 MMHG | HEART RATE: 60 BPM | HEIGHT: 64 IN | WEIGHT: 222 LBS | DIASTOLIC BLOOD PRESSURE: 70 MMHG | OXYGEN SATURATION: 92 %

## 2021-06-06 NOTE — PROGRESS NOTES
Patient instructed in use of overnight testing equipment from [facility].  Patient states good understanding of how to use the overnight testing equipment.  All forms signed and faxed to [facility].  Patient states she will be returning the device to the [place] location of [facility] (closest to her home).  Phone numbers given to call if any questions.

## 2021-06-06 NOTE — PROGRESS NOTES
[facility] Pulmonary Clinic Visit    Problems Addressed Today  Problem List Items Addressed This Visit     COPD    SAM (obstructive sleep apnea)    Relevant Orders    Oximetry, overnight w/strip rcdr      Other Visit Diagnoses     Dyspnea on exertion    -  Primary    Relevant Orders    Check Pulse Oximetry while ambulating        Assessment:49F with moderate COPD (currently group D), FEV1>50% after BD.  She had a few hospitalizations but is now improving after quitting smoking.  On appropriate medications.  Could consider decreasing steroid component of breo but will hold off for now    Plans and recommendations:  amb oxygen check today  Over night oxygen check on CPAP off oxygen  Continue Breo/anoro (she did not tolerate trelegy)  Continue nebs  To call in if she gets symptoms of chest cold  Graded exercise program    3 problems, 1 new with further workup    Chief Complaint:dyspnea on exertion    HPI: 49F with dyspnea on exertion, recurrent COPD exacerbations.  Worse with smoking, walking, no other clear triggers.  Better with prednisone and hospital stay.  Symptoms localize to the chest.  Some cough, a little bit of sputum.She has heartburn.    Quit smoking 7 weeks ago.  Uses patches, lozenges, gum.  No other meds.    She has SAM and uses CPAP.    Uses oxygen. [place]. 1 lpm.  At night.  Current Outpatient Medications on File Prior to Visit   Medication Sig Dispense Refill     acetaminophen (TYLENOL) 500 MG tablet Take 1,000 mg by mouth every 6 (six) hours as needed for pain.              albuterol (PROAIR HFA;PROVENTIL HFA;VENTOLIN HFA) 90 mcg/actuation inhaler Inhale 2 puffs every 6 (six) hours as needed for wheezing.       benztropine (COGENTIN) 0.5 MG tablet Take 0.5 mg by mouth at bedtime.        cholecalciferol, vitamin D3, 2,000 unit Tab Take 2,000 Units by mouth daily.       famotidine (PEPCID) 20 MG tablet Take 20 mg by mouth daily as needed for heartburn.       fluticasone furoate-vilanterol (BREO  ELLIPTA) 200-25 mcg/dose DsDv inhaler Inhale 1 puff daily.       gabapentin (NEURONTIN) 100 MG capsule Take 100 mg by mouth 2 times a day at 9:00am and 5:00pm. 0800 and 1400       gabapentin (NEURONTIN) 400 MG capsule Take 400 mg by mouth at bedtime.       ipratropium-albuterol (DUO-NEB) 0.5-2.5 mg/3 mL nebulizer Take 3 mL by nebulization 4 (four) times a day as needed.       predniSONE (DELTASONE) 10 mg tablet Take 4 tabs daily for 2 days, then 3 tabs daily for 3 days, then 2 tabs daily for 3 days, then 1 tab daily for 3 days, then stop.. 26 tablet 0     QUEtiapine (SEROQUEL) 100 MG tablet Take 150 mg by mouth at bedtime.       umeclidinium (INCRUSE ELLIPTA) 62.5 mcg/actuation DsDv inhaler Inhale 62.5 mcg daily.       venlafaxine (EFFEXOR-XR) 150 MG 24 hr capsule Take 150 mg by mouth daily.       No current facility-administered medications on file prior to visit.        Review of Systems negative x 12 systems except as above  Medical History  Active Ambulatory (Non-Hospital) Problems    Diagnosis     Acute respiratory failure with hypoxia (H)     Lactic acid increased     COPD exacerbation (H)     SAM (obstructive sleep apnea)     Lactic acid blood increased     Sinus tachycardia     Steroid-induced hyperglycemia     Leukocytosis, unspecified type     Depression     Major depressive disorder, recurrent episode, moderate (H)     COPD     Hepatic cyst     Hypoxia     Encounters for unspecified administrative purpose     Current smoker     Generalized anxiety disorder     Insomnia, unspecified     GERD (gastroesophageal reflux disease)     Past Medical History:   Diagnosis Date     Anxiety      Arthritis      Asthma      COPD (chronic obstructive pulmonary disease) (H)      Depression         Surgical History  She  has a past surgical history that includes Tonsillectomy and Adenoidectomy.       Social History  Reviewed, and she  reports that she quit smoking about 7 weeks ago. Her smoking use included cigarettes. She  "smoked 1.00 pack per day. She has never used smokeless tobacco. She reports that she does not drink alcohol or use drugs.       Allergies  Allergies   Allergen Reactions     Amoxicillin      As kid had mold test and told allergic      Dust [Other Environmental Allergy]      Mold      Mold/Mildew      Penicillins      As kid had mold test and told allergic      Pollen     Family History  Reviewed, and family history includes Bipolar disorder in her sister; Diabetes in her maternal grandmother and mother; Heart disease in her maternal grandmother and mother; Mental illness in her mother; Thyroid disease in her mother.          /80   Pulse 94   Resp 20   Ht 5' 4\" (1.626 m)   Wt 207 lb (93.9 kg)   SpO2 93%   Breastfeeding No   BMI 35.53 kg/m    General: calm, normal body habitus  Eyes: no scleral injection or icterus, pupils equal and round  Nose: patent nares  Mouth: oropharynx benign, MP2  Neck: supple, no lymph nodes  Chest: nontender to palpation, no deformity  Lungs: no wheezes audible  CV: palpable radial pulses, RRR, no m/r/g, normal PMI  Abd: soft, nontender  Ext: no clubbing, no ulnar deviation of digits  Neuro: alert and interactive, no tremor or abnormal movements  Pscyh: normal affect appropriate to clinical scenario, no hallucination      Data Review - imaging, labs, and ekgs listed below were reviewed by me.  Chest XRay and chest CT images and EKG tracings interpreted personally.     Past Labs  Ancillary Procedure on 02/21/2020   Component Date Value     Hgb 02/21/2020 14.2    Admission on 01/01/2020, Discharged on 01/03/2020   Component Date Value     VENTRICULAR RATE 01/01/2020 93      ATRIAL RATE 01/01/2020 93      P-R INTERVAL 01/01/2020 138      QRS DURATION 01/01/2020 106      Q-T INTERVAL 01/01/2020 408      QTC CALCULATION (BEZET) 01/01/2020 507      P Axis 01/01/2020 75      R AXIS 01/01/2020 -57      T AXIS 01/01/2020 38      MUSE DIAGNOSIS 01/01/2020                      Value:** " Poor data quality, interpretation may be adversely affected  Normal sinus rhythm  Left axis deviation  Prolonged QT  Abnormal ECG  When compared with ECG of 04-NOV-2019 14:58,  No significant change was found  Confirmed by SEE ED PROVIDER NOTE FOR, ECG INTERPRETATION ([id number]),  INESSA PIERCE ([id number]) on 1/2/2020 7:03:55 AM       Sodium 01/01/2020 141      Potassium 01/01/2020 4.2      Chloride 01/01/2020 108*     CO2 01/01/2020 22      Anion Gap, Calculation 01/01/2020 11      Glucose 01/01/2020 158*     Calcium 01/01/2020 9.1      BUN 01/01/2020 11      Creatinine 01/01/2020 0.76      GFR MDRD Af Amer 01/01/2020 >60      GFR MDRD Non Af Amer 01/01/2020 >60      BNP 01/01/2020 16      Troponin I 01/01/2020 0.02      WBC 01/01/2020 9.2      RBC 01/01/2020 5.13      Hemoglobin 01/01/2020 15.0      Hematocrit 01/01/2020 45.1      MCV 01/01/2020 88      MCH 01/01/2020 29.2      MCHC 01/01/2020 33.3      RDW 01/01/2020 13.9      Platelets 01/01/2020 199      MPV 01/01/2020 10.7      Influenza  A, Rapid Anti* 01/01/2020 No Influenza A antigen detected      Influenza B, Rapid Antig* 01/01/2020 No Influenza B antigen detected      Lactic Acid 01/01/2020 2.0      Procalcitonin 01/02/2020 0.02      Hemoglobin A1c 01/02/2020 6.0      WBC 01/02/2020 7.0      RBC 01/02/2020 4.85      Hemoglobin 01/02/2020 14.1      Hematocrit 01/02/2020 43.1      MCV 01/02/2020 89      MCH 01/02/2020 29.1      MCHC 01/02/2020 32.7      RDW 01/02/2020 13.9      Platelets 01/02/2020 178      MPV 01/02/2020 10.5      Neutrophils % 01/02/2020 82*     Lymphocytes % 01/02/2020 8*     Monocytes % 01/02/2020 9      Eosinophils % 01/02/2020 0      Basophils % 01/02/2020 0      Neutrophils Absolute 01/02/2020 5.8      Lymphocytes Absolute 01/02/2020 0.6*     Monocytes Absolute 01/02/2020 0.6      Eosinophils Absolute 01/02/2020 0.0      Basophils Absolute 01/02/2020 0.0        * Cannot find OR log *    Past Imaging  No results found.    Cardiac  Tests:LBBB, sinus rhythm      PFTs interpreted: severe obstruction with air trapping, moderate diffusion defect    CT interpreted: mild emphysema with multiple small granuloma. Linear scar in RUL improved.

## 2021-06-06 NOTE — PROGRESS NOTES
Oxygen saturation walk test    Patient oxygen saturation on RA at rest is 93%.  Oxygen saturation while ambulating 300ft on RA is 90%.    DME Provider: [facility]    Patient is ambulatory within his/her home.

## 2021-06-09 NOTE — TELEPHONE ENCOUNTER
----- Message from Reymundo Singh MD sent at 7/17/2020 12:22 PM CDT -----  She can be set up for a non urgent visit with me to follow up SAM, copd    Thank you

## 2021-06-11 NOTE — PROGRESS NOTES
[facility] Pulmonary Clinic Visit    Problems Addressed Today  Problem List Items Addressed This Visit     COPD - Primary    Relevant Medications    albuterol (PROVENTIL) 2.5 mg /3 mL (0.083 %) nebulizer solution    sodium chloride 0.9 % nebulizer solution    fluticasone furoate-vilanteroL (BREO ELLIPTA) 200-25 mcg/dose DsDv inhaler    SAM (obstructive sleep apnea)    Relevant Medications    albuterol (PROVENTIL) 2.5 mg /3 mL (0.083 %) nebulizer solution    fluticasone furoate-vilanteroL (BREO ELLIPTA) 200-25 mcg/dose DsDv inhaler        Assessment:49F with moderate COPD (currently group D), FEV1>50% after BD.  She had a few hospitalizations but is now improving after quitting smoking.  On appropriate medications.  She starts to have symptoms on days that she does not use her Brio so we will hold off decreasing steroids.    Plans and recommendations:  Continue inhalers  Continue CPAP every day  Oxygen at 1 L/min at night      Chief Complaint:dyspnea on exertion    HPI: 49F with dyspnea on exertion, COPD.  She is overall doing okay.  She is wearing her CPAP every night.  She has not had any exacerbations of her COPD since her last visit.  Her primary concern is use of her mask and its effect on her symptoms.  She has increased oxygen saturations on days that she wears her masks.    She stopped smoking.    She has SAM and uses CPAP.    Uses oxygen. [place]. 1 lpm.  At night.  Current Outpatient Medications on File Prior to Visit   Medication Sig Dispense Refill     acetaminophen (TYLENOL) 500 MG tablet Take 1,000 mg by mouth every 6 (six) hours as needed for pain.              albuterol (PROAIR HFA;PROVENTIL HFA;VENTOLIN HFA) 90 mcg/actuation inhaler Inhale 2 puffs every 6 (six) hours as needed for wheezing.       benztropine (COGENTIN) 0.5 MG tablet Take 0.5 mg by mouth at bedtime.        cholecalciferol, vitamin D3, 2,000 unit Tab Take 2,000 Units by mouth daily.       famotidine (PEPCID) 20 MG tablet Take 20 mg  by mouth daily as needed for heartburn.       gabapentin (NEURONTIN) 100 MG capsule Take 100 mg by mouth 2 times a day at 9:00am and 5:00pm. 0800 and 1400       gabapentin (NEURONTIN) 400 MG capsule Take 400 mg by mouth at bedtime.       QUEtiapine (SEROQUEL) 100 MG tablet Take 150 mg by mouth at bedtime.       umeclidinium (INCRUSE ELLIPTA) 62.5 mcg/actuation DsDv inhaler Inhale 62.5 mcg daily.       venlafaxine (EFFEXOR-XR) 150 MG 24 hr capsule Take 150 mg by mouth daily.       [DISCONTINUED] fluticasone furoate-vilanterol (BREO ELLIPTA) 200-25 mcg/dose DsDv inhaler Inhale 1 puff daily.       [DISCONTINUED] ipratropium-albuterol (DUO-NEB) 0.5-2.5 mg/3 mL nebulizer Take 3 mL by nebulization 4 (four) times a day as needed.       [DISCONTINUED] predniSONE (DELTASONE) 10 mg tablet Take 4 tabs daily for 2 days, then 3 tabs daily for 3 days, then 2 tabs daily for 3 days, then 1 tab daily for 3 days, then stop.. 26 tablet 0     No current facility-administered medications on file prior to visit.        Review of Systems negative x 12 systems except as above  Medical History  Active Ambulatory (Non-Hospital) Problems    Diagnosis     Paresthesia     SAM (obstructive sleep apnea)     Depression     Major depressive disorder, recurrent episode, moderate (H)     COPD     Hepatic cyst     Hypoxia     Encounters for unspecified administrative purpose     Generalized anxiety disorder     Insomnia, unspecified     GERD (gastroesophageal reflux disease)     Past Medical History:   Diagnosis Date     Anxiety      Arthritis      Asthma      COPD (chronic obstructive pulmonary disease) (H)      Depression         Surgical History  She  has a past surgical history that includes Tonsillectomy and Adenoidectomy.       Social History  Reviewed, and she  reports that she quit smoking about 8 months ago. Her smoking use included cigarettes. She smoked 1.00 pack per day. She has never used smokeless tobacco. She reports that she does not  "drink alcohol or use drugs.       Allergies  Allergies   Allergen Reactions     Amoxicillin      As kid had mold test and told allergic      Dust [Other Environmental Allergy]      Mold      Mold/Mildew      Penicillins      As kid had mold test and told allergic      Pollen     Family History  Reviewed, and family history includes Bipolar disorder in her sister; Diabetes in her maternal grandmother and mother; Heart disease in her maternal grandmother and mother; Mental illness in her mother; Thyroid disease in her mother.          /72   Pulse 83   Resp 12   Ht 5' 4\" (1.626 m)   Wt 207 lb (93.9 kg)   SpO2 95%   BMI 35.53 kg/m    General: calm, normal body habitus  Eyes: no scleral injection or icterus, pupils equal and round  Nose: patent nares  Neck: supple, no lymph nodes  Chest: nontender to palpation, no deformity  Lungs: no wheezes audible  CV: palpable radial pulses, RRR, no m/r/g, normal PMI  Ext: no clubbing, no ulnar deviation of digits  Neuro: alert and interactive, no tremor or abnormal movements  Pscyh: normal affect appropriate to clinical scenario, no hallucination      Data Review - imaging, labs, and ekgs listed below were reviewed by me.  Chest XRay and chest CT images and EKG tracings interpreted personally.     Past Labs  No visits with results within 2 Month(s) from this visit.   Latest known visit with results is:   Admission on 03/12/2020, Discharged on 03/12/2020   Component Date Value     Sodium 03/12/2020 139      Potassium 03/12/2020 4.0      Chloride 03/12/2020 108*     CO2 03/12/2020 23      Anion Gap, Calculation 03/12/2020 8      Glucose 03/12/2020 98      Calcium 03/12/2020 9.0      BUN 03/12/2020 17      Creatinine 03/12/2020 0.67      GFR MDRD Af Amer 03/12/2020 >60      GFR MDRD Non Af Amer 03/12/2020 >60      Magnesium 03/12/2020 1.7*     WBC 03/12/2020 7.2      RBC 03/12/2020 4.44      Hemoglobin 03/12/2020 12.9      Hematocrit 03/12/2020 39.7      MCV 03/12/2020 89  "     MCH 03/12/2020 29.1      MCHC 03/12/2020 32.5      RDW 03/12/2020 13.2      Platelets 03/12/2020 195      MPV 03/12/2020 10.3        * Cannot find OR log *    Past Imaging  No results found.    Cardiac Tests:LBBB, sinus rhythm    Overnight oximetry interpreted: Significant period of time spent at saturations percent but no severe desaturations.    Greater than 25 minutes, greater than 50% counseling and coordination of care.

## 2021-06-15 NOTE — PROGRESS NOTES
[facility] Pulmonary Clinic Visit    Problems Addressed Today  Problem List Items Addressed This Visit     COPD    Relevant Medications    albuterol (PROAIR HFA;PROVENTIL HFA;VENTOLIN HFA) 90 mcg/actuation inhaler    sodium chloride 0.9 % nebulizer solution        Assessment:49F with moderate COPD (currently group D), FEV1>50% after BD.  Previously had issues with hospitalizations but now is doing well since quitting smoking.  She still having some thick sputum so we will try holding her anticholinergic.    I recommended that she discuss with her primary care clinic-the ordering clinic-for getting a new sleep machine.    We discussed the positives and negatives of Covid vaccination.    Plans and recommendations:  Stop Incruse-continue other inhalers  Get a new CPAP machine  Oxygen at 1 L/min at night      Chief Complaint:dyspnea on exertion    HPI: 50-year-old woman with COPD.  No exacerbations in the last 6 months.  She is having some pretty thick sputum that is tough to mobilize.  No hemoptysis or fever.  She is taking her medications as directed and not having any adverse effects.    Uses oxygen. [place]. 1 lpm.  At night.  Current Outpatient Medications on File Prior to Visit   Medication Sig Dispense Refill     acetaminophen (TYLENOL) 500 MG tablet Take 1,000 mg by mouth every 6 (six) hours as needed for pain.              albuterol (PROVENTIL) 2.5 mg /3 mL (0.083 %) nebulizer solution Take 3 mL (2.5 mg total) by nebulization every 6 (six) hours as needed for wheezing. 90 vial 11     benztropine (COGENTIN) 0.5 MG tablet Take 0.5 mg by mouth at bedtime.        famotidine (PEPCID) 20 MG tablet Take 20 mg by mouth daily as needed for heartburn.       fluticasone furoate-vilanteroL (BREO ELLIPTA) 200-25 mcg/dose DsDv inhaler Inhale 1 puff daily. 1 each 11     gabapentin (NEURONTIN) 100 MG capsule Take 100 mg by mouth 2 times a day at 9:00am and 5:00pm. 0800 and 1400       gabapentin (NEURONTIN) 400 MG capsule  Take 400 mg by mouth at bedtime.       QUEtiapine (SEROQUEL) 100 MG tablet Take 150 mg by mouth at bedtime.       umeclidinium (INCRUSE ELLIPTA) 62.5 mcg/actuation DsDv inhaler Inhale 1 puff daily. 30 each 11     venlafaxine (EFFEXOR-XR) 150 MG 24 hr capsule Take 150 mg by mouth daily.       [DISCONTINUED] albuterol (PROAIR HFA;PROVENTIL HFA;VENTOLIN HFA) 90 mcg/actuation inhaler Inhale 2 puffs every 6 (six) hours as needed for wheezing.       [DISCONTINUED] sodium chloride 0.9 % nebulizer solution Take 3 mL by nebulization 2 (two) times a day as needed for wheezing. 180 mL 3     cholecalciferol, vitamin D3, 2,000 unit Tab Take 2,000 Units by mouth daily.       No current facility-administered medications on file prior to visit.        Review of Systems negative x 12 systems except as above  Medical History  Active Ambulatory (Non-Hospital) Problems    Diagnosis     Paresthesia     SAM (obstructive sleep apnea)     Depression     Major depressive disorder, recurrent episode, moderate (H)     COPD     Hepatic cyst     Hypoxia     Encounters for unspecified administrative purpose     Generalized anxiety disorder     Insomnia, unspecified     GERD (gastroesophageal reflux disease)     Past Medical History:   Diagnosis Date     Anxiety      Arthritis      Asthma      COPD (chronic obstructive pulmonary disease) (H)      Depression         Surgical History  She  has a past surgical history that includes Tonsillectomy and Adenoidectomy.       Social History  Reviewed, and she  reports that she quit smoking about 14 months ago. Her smoking use included cigarettes. She smoked 1.00 pack per day. She has never used smokeless tobacco. She reports that she does not drink alcohol or use drugs.       Allergies  Allergies   Allergen Reactions     Amoxicillin      As kid had mold test and told allergic      Dust [Other Environmental Allergy]      Mold      Mold/Mildew      Penicillins      As kid had mold test and told allergic   "    Pollen     Family History  Reviewed, and family history includes Bipolar disorder in her sister; Diabetes in her maternal grandmother and mother; Heart disease in her maternal grandmother and mother; Mental illness in her mother; Thyroid disease in her mother.          /70   Pulse 60   Ht 5' 4\" (1.626 m)   Wt 222 lb (100.7 kg)   SpO2 92%   BMI 38.11 kg/m    General: calm, normal body habitus  Eyes: no scleral injection or icterus, pupils equal and round  Nose: patent nares  Neck: supple, no lymph nodes  Chest: nontender to palpation, no deformity  Lungs: no wheezes audible  CV: palpable radial pulses, RRR, no m/r/g, normal PMI  Ext: no clubbing, no ulnar deviation of digits  Neuro: alert and interactive, no tremor or abnormal movements  Pscyh: normal affect appropriate to clinical scenario, no hallucination      Data Review - imaging, labs, and ekgs listed below were reviewed by me.  Chest XRay and chest CT images and EKG tracings interpreted personally.     Past Labs  No visits with results within 2 Month(s) from this visit.   Latest known visit with results is:   Lab Requisition on 12/29/2020   Component Date Value     COVID-19 VIRUS SPECIMEN * 12/29/2020 Nasopharyngeal      2019-nCOV 12/29/2020 Not Detected        * Cannot find OR log *    Past Imaging  No results found.      Greater than 25 minutes spent on this visit.  "

## 2021-06-16 NOTE — TELEPHONE ENCOUNTER
Eva called to let Dr. Singh that she started her Incruse back up on March 11 th. And is feeling better taking it then when she was not taking it.

## 2021-06-19 NOTE — LETTER
Letter by Lacie Hernandes DO at      Author: Lacie Hernandes DO Service: -- Author Type: --    Filed:  Encounter Date: 11/6/2019 Status: Signed         November 6, 2019     Patient: Eva Parikh   YOB: 1970   Date of Visit: 11/6/2019       To Whom It May Concern:    It is my medical opinion that Eva Parikh may return to work and her normal duties as soon as she feels well enough to.  Pt was hospitalized from 11/4-11/6 and likely will need 11/7-11/8 off before returning, but if she chooses to go back to work prior to 11/9 she is medically cleared to do so.    If you have any questions or concerns, please don't hesitate to call.    Sincerely,        Electronically signed by Lacie Hernandes DO

## 2021-06-20 NOTE — LETTER
Letter by Reymundo Singh MD at      Author: Reymundo Singh MD Service: -- Author Type: --    Filed:  Encounter Date: 2020 Status: (Other)         Eva Parikh  [address] [place] [address]      2020      MRN: [id number]  : 1970      Dear Ms. Parikh,    Thank you for choosing [facility] for your clinic needs. It is our privilege to help our patients achieve and maintain the best possible lung health through regular clinic visits.     We have attempted to contact you to schedule an appointment with Reymundo Singh MD.   To help ensure you are in the best health possible, regular follow-up visits and scheduled/ordered tests with your pulmonary team is essential.      Please call our Patient Scheduling Line at [phone] to schedule your appointment.     We look forward to providing your care. As always, we are available at the number above for any questions or concerns you may have.    Sincerely,    [facility]  Reymundo Singh MD

## 2021-06-21 NOTE — PROGRESS NOTES
RESPIRATORY CARE NOTE     Patient Name: Eva Parikh  Today's Date: 10/22/2018     Complete PFT done. Pt performed tests with good effort. Test results meet ATS criteria. Results scanned into epic. Pt left in no distress.  On DLCO- Inspiratory Volume is less than 90% of VC with each effort.      Tiki Lee

## 2021-07-14 PROBLEM — J44.1 COPD EXACERBATION (H): Status: RESOLVED | Noted: 2019-11-04 | Resolved: 2020-02-21

## 2021-07-14 PROBLEM — R00.0 SINUS TACHYCARDIA: Status: RESOLVED | Noted: 2019-11-04 | Resolved: 2020-02-21

## 2021-07-14 PROBLEM — J96.01 ACUTE RESPIRATORY FAILURE WITH HYPOXIA (H): Status: RESOLVED | Noted: 2020-01-01 | Resolved: 2020-02-21

## 2021-08-04 ENCOUNTER — TELEPHONE (OUTPATIENT)
Dept: PULMONOLOGY | Facility: OTHER | Age: 51
End: 2021-08-04

## 2021-09-20 DIAGNOSIS — J41.0 SIMPLE CHRONIC BRONCHITIS (H): Primary | ICD-10-CM

## 2021-11-23 DIAGNOSIS — J41.0 SIMPLE CHRONIC BRONCHITIS (H): Primary | ICD-10-CM

## 2021-11-23 DIAGNOSIS — G25.0 ESSENTIAL TREMOR: ICD-10-CM

## 2021-11-24 DIAGNOSIS — J41.0 SIMPLE CHRONIC BRONCHITIS (H): ICD-10-CM

## 2021-11-26 DIAGNOSIS — J41.0 SIMPLE CHRONIC BRONCHITIS (H): ICD-10-CM

## 2022-06-30 DIAGNOSIS — J41.0 SIMPLE CHRONIC BRONCHITIS (H): Primary | ICD-10-CM

## 2022-06-30 RX ORDER — ALBUTEROL SULFATE 0.83 MG/ML
2.5 SOLUTION RESPIRATORY (INHALATION) EVERY 6 HOURS PRN
Qty: 90 ML | Refills: 3 | Status: SHIPPED | OUTPATIENT
Start: 2022-06-30 | End: 2022-08-29

## 2022-07-29 ENCOUNTER — TELEPHONE (OUTPATIENT)
Dept: PULMONOLOGY | Facility: OTHER | Age: 52
End: 2022-07-29

## 2022-07-29 NOTE — TELEPHONE ENCOUNTER
Central Prior Authorization Team   Phone: [phone]      Prior Authorization Not Needed per Insurance    Medication: Fluticasone/vilanterol 20-25mcg - NOT NEEDED  Insurance Company: [employer] - Phone [phone] Fax [phone]  Expected CoPay:      Pharmacy Filling the Rx: [facility] [address] [place] [address] [[address]  Pharmacy Notified: Yes - verified pharmacy received paid claim  Patient Notified: Yes

## 2022-07-29 NOTE — TELEPHONE ENCOUNTER
Prior Authorization Retail Medication Request    Medication/Dose: Fluticasone/vilanterol 20-25mcg  ICD code (if different than what is on RX):  J41.0  Previously Tried and Failed:    Rationale:      Insurance Name:  [employer] FULLY INSURED/[employer] OPEN ACCESS  Insurance ID:  [id number]      Pharmacy Information (if different than what is on RX)  Name:  [facility]  Phone:  [phone]

## 2022-08-29 ENCOUNTER — OFFICE VISIT (OUTPATIENT)
Dept: PULMONOLOGY | Facility: OTHER | Age: 52
End: 2022-08-29
Payer: COMMERCIAL

## 2022-08-29 VITALS
BODY MASS INDEX: 42.91 KG/M2 | WEIGHT: 250 LBS | SYSTOLIC BLOOD PRESSURE: 134 MMHG | OXYGEN SATURATION: 93 % | HEART RATE: 100 BPM | DIASTOLIC BLOOD PRESSURE: 80 MMHG

## 2022-08-29 DIAGNOSIS — J41.0 SIMPLE CHRONIC BRONCHITIS (H): ICD-10-CM

## 2022-08-29 DIAGNOSIS — G47.33 OSA (OBSTRUCTIVE SLEEP APNEA): ICD-10-CM

## 2022-08-29 DIAGNOSIS — J44.89 ASTHMA-COPD OVERLAP SYNDROME (H): Primary | ICD-10-CM

## 2022-08-29 PROCEDURE — 99214 OFFICE O/P EST MOD 30 MIN: CPT | Performed by: INTERNAL MEDICINE

## 2022-08-29 RX ORDER — AZITHROMYCIN 250 MG/1
TABLET, FILM COATED ORAL
Qty: 6 TABLET | Refills: 0 | Status: SHIPPED | OUTPATIENT
Start: 2022-08-29 | End: 2022-09-03

## 2022-08-29 RX ORDER — PREDNISONE 10 MG/1
TABLET ORAL
Qty: 21 TABLET | Refills: 3 | Status: SHIPPED | OUTPATIENT
Start: 2022-08-29 | End: 2023-07-03

## 2022-08-29 RX ORDER — BENZTROPINE MESYLATE 0.5 MG/1
0.5 TABLET ORAL AT BEDTIME
COMMUNITY

## 2022-08-29 RX ORDER — ALBUTEROL SULFATE 90 UG/1
2 AEROSOL, METERED RESPIRATORY (INHALATION) EVERY 6 HOURS
Qty: 18 G | Refills: 11 | Status: SHIPPED | OUTPATIENT
Start: 2022-08-29 | End: 2023-09-13

## 2022-08-29 RX ORDER — ALBUTEROL SULFATE 0.83 MG/ML
2.5 SOLUTION RESPIRATORY (INHALATION) EVERY 6 HOURS PRN
Qty: 90 ML | Refills: 3 | Status: SHIPPED | OUTPATIENT
Start: 2022-08-29 | End: 2023-09-13

## 2022-08-29 NOTE — PROGRESS NOTES
LUNG NODULE & INTERVENTIONAL PULMONARY CLINIC  [facility], [facility]     Eva Parikh MRN# [id number]   Age: 51 year old YOB: 1970       Requesting Physician: No referring provider defined for this encounter.       Assessment and Plan:    1. Asthma/COPD overlap syndrome: on triple inhaler therapy.  Does not want to consolidate to trelegy due to poor tolerance.  Reflux may be a complicating comorbidity. She will try behavioral changes.    2. SAM: she is not using her mask due to sleep interruption.  By her report, she was told it was severe.  I encouraged her to re-engage with them.  Untreated sleep apnea could be an exacerbating factor.           History:     Eva Parikh is a 51 year old female with sig h/o for COPD/asthma overlap who is here for evaluation/followup of COPD/asthma overlap syndrome.  She had a flare in  with out a clear cause.  It has been persistent since then.  She is having some reflux symptoms. She is not using her CPAP            Past Medical History:      Past Medical History:   Diagnosis Date     Anxiety      Arthritis      Asthma      COPD (chronic obstructive pulmonary disease) (H)      Depression            Past Surgical History:      Past Surgical History:   Procedure Laterality Date     ADENOIDECTOMY       TONSILLECTOMY            Social History:     Social History     Tobacco Use     Smoking status: Former Smoker     Packs/day: 1.00     Types: Cigarettes, Cigarettes     Quit date: 2020     Years since quittin.6     Smokeless tobacco: Never Used     Tobacco comment: Decreased from 2 ppd to 1 ppd since May 2014   Substance Use Topics     Alcohol use: No          Family History:     Family History   Problem Relation Age of Onset     Diabetes Mother      Thyroid Disease Mother      Heart Disease Mother      Mental Illness Mother      Diabetes Maternal Grandmother      Heart Disease Maternal  Grandmother      Bipolar Disorder Sister            Allergies:      Allergies   Allergen Reactions     Amoxicillin Unknown     As kid had mold test and told allergic      Mold [Molds & Smuts] Unknown     Mold/Mildew [Molds & Smuts] Unknown     Other Environmental Allergy Unknown     DUST     Penicillins Unknown     As kid had mold test and told allergic      Pollen [Pollen Extract] Unknown          Medications:     Current Outpatient Medications   Medication Sig     albuterol (PROVENTIL) (2.5 MG/3ML) 0.083% neb solution Take 1 vial (2.5 mg) by nebulization every 6 hours as needed for shortness of breath / dyspnea or wheezing     benztropine (COGENTIN) 2 MG tablet Take 2 mg by mouth 2 times daily PT UNSURE OF DOSE     fluticasone-vilanterol (BREO ELLIPTA) 200-25 MCG/INH inhaler Inhale 1 puff into the lungs daily     gabapentin (NEURONTIN) 400 MG capsule Take 400 mg by mouth At Bedtime     nicotine (NICODERM CQ) 21 MG/24HR 24 hr patch Place 1 patch onto the skin every 24 hours     nicotine (NICORETTE) 4 MG lozenge Place 4 mg inside cheek as needed for smoking cessation     umeclidinium (INCRUSE ELLIPTA) 62.5 MCG/INH inhaler Inhale 1 puff into the lungs daily     atenolol (TENORMIN) 25 MG tablet Take 0.5 tablets (12.5 mg) by mouth 2 times daily     QUEtiapine (SEROQUEL) 50 MG tablet Take 3 tablets (150 mg) by mouth At Bedtime     venlafaxine (EFFEXOR-XR) 150 MG 24 hr capsule Take 1 capsule (150 mg) by mouth daily (with breakfast)     No current facility-administered medications for this visit.          Review of Systems:     See HPI         Physical Exam:   /80 (BP Location: Right arm, Patient Position: Sitting, Cuff Size: Adult Large)   Pulse 100   Wt 113.4 kg (250 lb)   SpO2 93%   BMI 42.91 kg/m      Constitutional - looks well, in no apparent distress  Eyes - no redness or discharge  Respiratory -breathing appears comfortable. No wheeze or rhonchi.   Cardiac -- Normal rate, rhythm.   Skin - No appreciable  discoloration or lesions (very limited exam)  Neurological - No apparent tremors. Speech fluent and articlate  Psychiatric - no signs of delirium or anxiety          Current Laboratory Data:   All laboratory and imaging data reviewed.    No results found for this or any previous visit (from the past 24 hour(s)).

## 2023-07-03 ENCOUNTER — TELEPHONE (OUTPATIENT)
Dept: PULMONOLOGY | Facility: CLINIC | Age: 53
End: 2023-07-03
Payer: COMMERCIAL

## 2023-07-03 DIAGNOSIS — J44.89 ASTHMA-COPD OVERLAP SYNDROME (H): ICD-10-CM

## 2023-07-03 RX ORDER — PREDNISONE 10 MG/1
TABLET ORAL
Qty: 14 TABLET | Refills: 0 | Status: SHIPPED | OUTPATIENT
Start: 2023-07-03 | End: 2023-08-26

## 2023-07-03 RX ORDER — AZITHROMYCIN 250 MG/1
TABLET, FILM COATED ORAL
Qty: 6 TABLET | Refills: 0 | Status: SHIPPED | OUTPATIENT
Start: 2023-07-03 | End: 2023-07-08

## 2023-07-03 NOTE — TELEPHONE ENCOUNTER
Eva called today with symptoms of shortness of breath, increase cough, increase in yellow colored thick phlegm but denies fever. Will send action plan of prednisone 20 mg and z-pack per Dr. Singh's last orders. Advised patient to stay indoors if possible and if symptoms do not improve, to utilize the ED. She has an appointment with Dr. Singh on 9/1/23. Informed Eva she could also come in to see one of our NP's if action plan does not work.

## 2023-08-26 ENCOUNTER — HOSPITAL ENCOUNTER (INPATIENT)
Facility: HOSPITAL | Age: 53
LOS: 6 days | Discharge: HOME | DRG: 291 | End: 2023-09-01
Attending: EMERGENCY MEDICINE | Admitting: INTERNAL MEDICINE
Payer: COMMERCIAL

## 2023-08-26 ENCOUNTER — APPOINTMENT (OUTPATIENT)
Dept: RADIOLOGY | Facility: HOSPITAL | Age: 53
DRG: 291 | End: 2023-08-26
Attending: EMERGENCY MEDICINE
Payer: COMMERCIAL

## 2023-08-26 DIAGNOSIS — I50.9 NEW ONSET OF CONGESTIVE HEART FAILURE (H): ICD-10-CM

## 2023-08-26 DIAGNOSIS — J44.89 ASTHMA-CHRONIC OBSTRUCTIVE PULMONARY DISEASE OVERLAP SYNDROME (H): Primary | ICD-10-CM

## 2023-08-26 DIAGNOSIS — R09.02 HYPOXIA: ICD-10-CM

## 2023-08-26 DIAGNOSIS — J44.1 COPD EXACERBATION (H): ICD-10-CM

## 2023-08-26 LAB
ALBUMIN SERPL BCG-MCNC: 3.8 G/DL (ref 3.5–5.2)
ALP SERPL-CCNC: 71 U/L (ref 35–104)
ALT SERPL W P-5'-P-CCNC: 63 U/L (ref 0–50)
ANION GAP SERPL CALCULATED.3IONS-SCNC: 12 MMOL/L (ref 7–15)
AST SERPL W P-5'-P-CCNC: 46 U/L (ref 0–45)
BASE EXCESS BLDV CALC-SCNC: -0.7 MMOL/L
BASOPHILS # BLD AUTO: 0.1 10E3/UL (ref 0–0.2)
BASOPHILS NFR BLD AUTO: 1 %
BILIRUB DIRECT SERPL-MCNC: 0.26 MG/DL (ref 0–0.3)
BILIRUB SERPL-MCNC: 0.8 MG/DL
BUN SERPL-MCNC: 12.3 MG/DL (ref 6–20)
CALCIUM SERPL-MCNC: 8.6 MG/DL (ref 8.6–10)
CHLORIDE SERPL-SCNC: 103 MMOL/L (ref 98–107)
CREAT SERPL-MCNC: 0.86 MG/DL (ref 0.51–0.95)
DEPRECATED HCO3 PLAS-SCNC: 22 MMOL/L (ref 22–29)
EOSINOPHIL # BLD AUTO: 0.1 10E3/UL (ref 0–0.7)
EOSINOPHIL NFR BLD AUTO: 1 %
ERYTHROCYTE [DISTWIDTH] IN BLOOD BY AUTOMATED COUNT: 14.2 % (ref 10–15)
FLUAV RNA SPEC QL NAA+PROBE: NEGATIVE
FLUBV RNA RESP QL NAA+PROBE: NEGATIVE
GFR SERPL CREATININE-BSD FRML MDRD: 80 ML/MIN/1.73M2
GLUCOSE SERPL-MCNC: 148 MG/DL (ref 70–99)
HCO3 BLDV-SCNC: 24 MMOL/L (ref 24–30)
HCT VFR BLD AUTO: 40.8 % (ref 35–47)
HGB BLD-MCNC: 12.9 G/DL (ref 11.7–15.7)
IMM GRANULOCYTES # BLD: 0 10E3/UL
IMM GRANULOCYTES NFR BLD: 1 %
LYMPHOCYTES # BLD AUTO: 1.1 10E3/UL (ref 0.8–5.3)
LYMPHOCYTES NFR BLD AUTO: 13 %
MAGNESIUM SERPL-MCNC: 1.9 MG/DL (ref 1.7–2.3)
MCH RBC QN AUTO: 28.5 PG (ref 26.5–33)
MCHC RBC AUTO-ENTMCNC: 31.6 G/DL (ref 31.5–36.5)
MCV RBC AUTO: 90 FL (ref 78–100)
MONOCYTES # BLD AUTO: 0.8 10E3/UL (ref 0–1.3)
MONOCYTES NFR BLD AUTO: 9 %
NEUTROPHILS # BLD AUTO: 6.3 10E3/UL (ref 1.6–8.3)
NEUTROPHILS NFR BLD AUTO: 75 %
NRBC # BLD AUTO: 0 10E3/UL
NRBC BLD AUTO-RTO: 0 /100
NT-PROBNP SERPL-MCNC: 9487 PG/ML (ref 0–900)
OXYHGB MFR BLDV: 89.8 % (ref 70–75)
PCO2 BLDV: 38 MM HG (ref 35–50)
PH BLDV: 7.41 [PH] (ref 7.35–7.45)
PLATELET # BLD AUTO: 194 10E3/UL (ref 150–450)
PO2 BLDV: 60 MM HG (ref 25–47)
POTASSIUM SERPL-SCNC: 3.9 MMOL/L (ref 3.4–5.3)
PROT SERPL-MCNC: 6.1 G/DL (ref 6.4–8.3)
RBC # BLD AUTO: 4.52 10E6/UL (ref 3.8–5.2)
RSV RNA SPEC NAA+PROBE: NEGATIVE
SAO2 % BLDV: 91.2 % (ref 70–75)
SARS-COV-2 RNA RESP QL NAA+PROBE: NEGATIVE
SODIUM SERPL-SCNC: 137 MMOL/L (ref 136–145)
TROPONIN T SERPL HS-MCNC: 37 NG/L
WBC # BLD AUTO: 8.3 10E3/UL (ref 4–11)

## 2023-08-26 PROCEDURE — 82248 BILIRUBIN DIRECT: CPT | Performed by: EMERGENCY MEDICINE

## 2023-08-26 PROCEDURE — 250N000013 HC RX MED GY IP 250 OP 250 PS 637: Performed by: EMERGENCY MEDICINE

## 2023-08-26 PROCEDURE — 99285 EMERGENCY DEPT VISIT HI MDM: CPT | Mod: 25

## 2023-08-26 PROCEDURE — 210N000001 HC R&B IMCU HEART CARE

## 2023-08-26 PROCEDURE — 250N000009 HC RX 250: Performed by: EMERGENCY MEDICINE

## 2023-08-26 PROCEDURE — 82805 BLOOD GASES W/O2 SATURATION: CPT | Performed by: EMERGENCY MEDICINE

## 2023-08-26 PROCEDURE — 93005 ELECTROCARDIOGRAM TRACING: CPT | Performed by: EMERGENCY MEDICINE

## 2023-08-26 PROCEDURE — 71045 X-RAY EXAM CHEST 1 VIEW: CPT

## 2023-08-26 PROCEDURE — 36415 COLL VENOUS BLD VENIPUNCTURE: CPT | Performed by: EMERGENCY MEDICINE

## 2023-08-26 PROCEDURE — 80053 COMPREHEN METABOLIC PANEL: CPT | Performed by: EMERGENCY MEDICINE

## 2023-08-26 PROCEDURE — 94640 AIRWAY INHALATION TREATMENT: CPT

## 2023-08-26 PROCEDURE — 99223 1ST HOSP IP/OBS HIGH 75: CPT | Performed by: INTERNAL MEDICINE

## 2023-08-26 PROCEDURE — 83735 ASSAY OF MAGNESIUM: CPT | Performed by: EMERGENCY MEDICINE

## 2023-08-26 PROCEDURE — 85025 COMPLETE CBC W/AUTO DIFF WBC: CPT | Performed by: EMERGENCY MEDICINE

## 2023-08-26 PROCEDURE — 250N000011 HC RX IP 250 OP 636: Mod: JZ | Performed by: EMERGENCY MEDICINE

## 2023-08-26 PROCEDURE — 83880 ASSAY OF NATRIURETIC PEPTIDE: CPT | Performed by: EMERGENCY MEDICINE

## 2023-08-26 PROCEDURE — 96374 THER/PROPH/DIAG INJ IV PUSH: CPT | Mod: 59

## 2023-08-26 PROCEDURE — 87637 SARSCOV2&INF A&B&RSV AMP PRB: CPT | Performed by: EMERGENCY MEDICINE

## 2023-08-26 PROCEDURE — 84484 ASSAY OF TROPONIN QUANT: CPT | Performed by: EMERGENCY MEDICINE

## 2023-08-26 RX ORDER — METHYLPREDNISOLONE SODIUM SUCCINATE 125 MG/2ML
125 INJECTION, POWDER, LYOPHILIZED, FOR SOLUTION INTRAMUSCULAR; INTRAVENOUS ONCE
Status: COMPLETED | OUTPATIENT
Start: 2023-08-26 | End: 2023-08-26

## 2023-08-26 RX ORDER — FUROSEMIDE 10 MG/ML
40 INJECTION INTRAMUSCULAR; INTRAVENOUS ONCE
Status: COMPLETED | OUTPATIENT
Start: 2023-08-26 | End: 2023-08-26

## 2023-08-26 RX ORDER — IPRATROPIUM BROMIDE AND ALBUTEROL SULFATE 2.5; .5 MG/3ML; MG/3ML
3 SOLUTION RESPIRATORY (INHALATION) ONCE
Status: COMPLETED | OUTPATIENT
Start: 2023-08-26 | End: 2023-08-26

## 2023-08-26 RX ORDER — GABAPENTIN 400 MG/1
400 CAPSULE ORAL AT BEDTIME
COMMUNITY
End: 2024-06-19

## 2023-08-26 RX ORDER — DULOXETIN HYDROCHLORIDE 30 MG/1
60 CAPSULE, DELAYED RELEASE ORAL DAILY
COMMUNITY
End: 2023-12-13 | Stop reason: DRUGHIGH

## 2023-08-26 RX ORDER — BENZONATATE 100 MG/1
200 CAPSULE ORAL ONCE
Status: COMPLETED | OUTPATIENT
Start: 2023-08-26 | End: 2023-08-26

## 2023-08-26 RX ORDER — QUETIAPINE FUMARATE 100 MG/1
1.5 TABLET, FILM COATED ORAL AT BEDTIME
COMMUNITY

## 2023-08-26 RX ADMIN — BENZONATATE 200 MG: 100 CAPSULE ORAL at 22:07

## 2023-08-26 RX ADMIN — IPRATROPIUM BROMIDE AND ALBUTEROL SULFATE 3 ML: 2.5; .5 SOLUTION RESPIRATORY (INHALATION) at 22:08

## 2023-08-26 RX ADMIN — FUROSEMIDE 40 MG: 10 INJECTION, SOLUTION INTRAMUSCULAR; INTRAVENOUS at 23:28

## 2023-08-26 RX ADMIN — METHYLPREDNISOLONE SODIUM SUCCINATE 125 MG: 125 INJECTION, POWDER, LYOPHILIZED, FOR SOLUTION INTRAMUSCULAR; INTRAVENOUS at 22:07

## 2023-08-26 ASSESSMENT — ACTIVITIES OF DAILY LIVING (ADL): ADLS_ACUITY_SCORE: 35

## 2023-08-27 ENCOUNTER — APPOINTMENT (OUTPATIENT)
Dept: CT IMAGING | Facility: HOSPITAL | Age: 53
DRG: 291 | End: 2023-08-27
Attending: EMERGENCY MEDICINE
Payer: COMMERCIAL

## 2023-08-27 ENCOUNTER — APPOINTMENT (OUTPATIENT)
Dept: CARDIOLOGY | Facility: HOSPITAL | Age: 53
DRG: 291 | End: 2023-08-27
Attending: INTERNAL MEDICINE
Payer: COMMERCIAL

## 2023-08-27 ENCOUNTER — APPOINTMENT (OUTPATIENT)
Dept: OCCUPATIONAL THERAPY | Facility: HOSPITAL | Age: 53
DRG: 291 | End: 2023-08-27
Attending: INTERNAL MEDICINE
Payer: COMMERCIAL

## 2023-08-27 PROBLEM — J44.1 COPD EXACERBATION (H): Status: RESOLVED | Noted: 2023-08-26 | Resolved: 2023-08-27

## 2023-08-27 PROBLEM — J44.89 ASTHMA-CHRONIC OBSTRUCTIVE PULMONARY DISEASE OVERLAP SYNDROME (H): Status: ACTIVE | Noted: 2023-08-27

## 2023-08-27 PROBLEM — G47.33 OSA (OBSTRUCTIVE SLEEP APNEA): Status: ACTIVE | Noted: 2023-08-27

## 2023-08-27 LAB
ALBUMIN SERPL BCG-MCNC: 3.7 G/DL (ref 3.5–5.2)
ALP SERPL-CCNC: 69 U/L (ref 35–104)
ALT SERPL W P-5'-P-CCNC: 61 U/L (ref 0–50)
ANION GAP SERPL CALCULATED.3IONS-SCNC: 11 MMOL/L (ref 7–15)
AST SERPL W P-5'-P-CCNC: 36 U/L (ref 0–45)
BASOPHILS # BLD AUTO: 0 10E3/UL (ref 0–0.2)
BASOPHILS NFR BLD AUTO: 0 %
BILIRUB SERPL-MCNC: 0.9 MG/DL
BUN SERPL-MCNC: 12.5 MG/DL (ref 6–20)
CALCIUM SERPL-MCNC: 8.3 MG/DL (ref 8.6–10)
CHLORIDE SERPL-SCNC: 102 MMOL/L (ref 98–107)
CREAT SERPL-MCNC: 0.87 MG/DL (ref 0.51–0.95)
DEPRECATED HCO3 PLAS-SCNC: 26 MMOL/L (ref 22–29)
EOSINOPHIL # BLD AUTO: 0 10E3/UL (ref 0–0.7)
EOSINOPHIL NFR BLD AUTO: 0 %
ERYTHROCYTE [DISTWIDTH] IN BLOOD BY AUTOMATED COUNT: 14 % (ref 10–15)
GFR SERPL CREATININE-BSD FRML MDRD: 79 ML/MIN/1.73M2
GLUCOSE SERPL-MCNC: 179 MG/DL (ref 70–99)
HCT VFR BLD AUTO: 39.6 % (ref 35–47)
HGB BLD-MCNC: 12.2 G/DL (ref 11.7–15.7)
IMM GRANULOCYTES # BLD: 0.1 10E3/UL
IMM GRANULOCYTES NFR BLD: 1 %
LVEF ECHO: NORMAL
LYMPHOCYTES # BLD AUTO: 0.3 10E3/UL (ref 0.8–5.3)
LYMPHOCYTES NFR BLD AUTO: 4 %
MCH RBC QN AUTO: 28 PG (ref 26.5–33)
MCHC RBC AUTO-ENTMCNC: 30.8 G/DL (ref 31.5–36.5)
MCV RBC AUTO: 91 FL (ref 78–100)
MONOCYTES # BLD AUTO: 0.1 10E3/UL (ref 0–1.3)
MONOCYTES NFR BLD AUTO: 1 %
NEUTROPHILS # BLD AUTO: 7.3 10E3/UL (ref 1.6–8.3)
NEUTROPHILS NFR BLD AUTO: 94 %
NRBC # BLD AUTO: 0 10E3/UL
NRBC BLD AUTO-RTO: 0 /100
PLATELET # BLD AUTO: 163 10E3/UL (ref 150–450)
POTASSIUM SERPL-SCNC: 3.9 MMOL/L (ref 3.4–5.3)
PROT SERPL-MCNC: 6 G/DL (ref 6.4–8.3)
RBC # BLD AUTO: 4.36 10E6/UL (ref 3.8–5.2)
SODIUM SERPL-SCNC: 139 MMOL/L (ref 136–145)
TROPONIN T SERPL HS-MCNC: 29 NG/L
TROPONIN T SERPL HS-MCNC: 32 NG/L
TROPONIN T SERPL HS-MCNC: 42 NG/L
TSH SERPL DL<=0.005 MIU/L-ACNC: 0.45 UIU/ML (ref 0.3–4.2)
WBC # BLD AUTO: 7.7 10E3/UL (ref 4–11)

## 2023-08-27 PROCEDURE — 80053 COMPREHEN METABOLIC PANEL: CPT | Performed by: INTERNAL MEDICINE

## 2023-08-27 PROCEDURE — 999N000157 HC STATISTIC RCP TIME EA 10 MIN

## 2023-08-27 PROCEDURE — 84484 ASSAY OF TROPONIN QUANT: CPT | Performed by: INTERNAL MEDICINE

## 2023-08-27 PROCEDURE — 71275 CT ANGIOGRAPHY CHEST: CPT

## 2023-08-27 PROCEDURE — 97165 OT EVAL LOW COMPLEX 30 MIN: CPT | Mod: GO

## 2023-08-27 PROCEDURE — 250N000013 HC RX MED GY IP 250 OP 250 PS 637: Performed by: INTERNAL MEDICINE

## 2023-08-27 PROCEDURE — 84484 ASSAY OF TROPONIN QUANT: CPT | Performed by: EMERGENCY MEDICINE

## 2023-08-27 PROCEDURE — 36415 COLL VENOUS BLD VENIPUNCTURE: CPT | Performed by: EMERGENCY MEDICINE

## 2023-08-27 PROCEDURE — 97535 SELF CARE MNGMENT TRAINING: CPT | Mod: GO

## 2023-08-27 PROCEDURE — 36415 COLL VENOUS BLD VENIPUNCTURE: CPT | Performed by: INTERNAL MEDICINE

## 2023-08-27 PROCEDURE — 99223 1ST HOSP IP/OBS HIGH 75: CPT | Performed by: INTERNAL MEDICINE

## 2023-08-27 PROCEDURE — 99233 SBSQ HOSP IP/OBS HIGH 50: CPT | Performed by: INTERNAL MEDICINE

## 2023-08-27 PROCEDURE — 84443 ASSAY THYROID STIM HORMONE: CPT | Performed by: INTERNAL MEDICINE

## 2023-08-27 PROCEDURE — 210N000001 HC R&B IMCU HEART CARE

## 2023-08-27 PROCEDURE — 250N000009 HC RX 250: Performed by: INTERNAL MEDICINE

## 2023-08-27 PROCEDURE — 250N000011 HC RX IP 250 OP 636: Mod: JZ | Performed by: INTERNAL MEDICINE

## 2023-08-27 PROCEDURE — 93306 TTE W/DOPPLER COMPLETE: CPT | Mod: 26 | Performed by: INTERNAL MEDICINE

## 2023-08-27 PROCEDURE — 255N000002 HC RX 255 OP 636: Performed by: INTERNAL MEDICINE

## 2023-08-27 PROCEDURE — 85025 COMPLETE CBC W/AUTO DIFF WBC: CPT | Performed by: INTERNAL MEDICINE

## 2023-08-27 PROCEDURE — 999N000208 ECHOCARDIOGRAM COMPLETE

## 2023-08-27 PROCEDURE — 250N000011 HC RX IP 250 OP 636: Mod: JZ | Performed by: EMERGENCY MEDICINE

## 2023-08-27 PROCEDURE — 94640 AIRWAY INHALATION TREATMENT: CPT

## 2023-08-27 RX ORDER — GABAPENTIN 100 MG/1
100 CAPSULE ORAL 2 TIMES DAILY
Status: DISCONTINUED | OUTPATIENT
Start: 2023-08-27 | End: 2023-09-01 | Stop reason: HOSPADM

## 2023-08-27 RX ORDER — ONDANSETRON 4 MG/1
4 TABLET, ORALLY DISINTEGRATING ORAL EVERY 6 HOURS PRN
Status: DISCONTINUED | OUTPATIENT
Start: 2023-08-27 | End: 2023-09-01 | Stop reason: HOSPADM

## 2023-08-27 RX ORDER — BENZTROPINE MESYLATE 0.5 MG/1
0.5 TABLET ORAL AT BEDTIME
Status: DISCONTINUED | OUTPATIENT
Start: 2023-08-27 | End: 2023-09-01 | Stop reason: HOSPADM

## 2023-08-27 RX ORDER — CARVEDILOL 3.12 MG/1
3.12 TABLET ORAL 2 TIMES DAILY WITH MEALS
Status: DISCONTINUED | OUTPATIENT
Start: 2023-08-27 | End: 2023-09-01 | Stop reason: HOSPADM

## 2023-08-27 RX ORDER — IOPAMIDOL 755 MG/ML
100 INJECTION, SOLUTION INTRAVASCULAR ONCE
Status: COMPLETED | OUTPATIENT
Start: 2023-08-27 | End: 2023-08-27

## 2023-08-27 RX ORDER — DULOXETIN HYDROCHLORIDE 60 MG/1
60 CAPSULE, DELAYED RELEASE ORAL DAILY
Status: DISCONTINUED | OUTPATIENT
Start: 2023-08-27 | End: 2023-09-01 | Stop reason: HOSPADM

## 2023-08-27 RX ORDER — IPRATROPIUM BROMIDE AND ALBUTEROL SULFATE 2.5; .5 MG/3ML; MG/3ML
3 SOLUTION RESPIRATORY (INHALATION) 4 TIMES DAILY PRN
Status: DISCONTINUED | OUTPATIENT
Start: 2023-08-27 | End: 2023-09-01 | Stop reason: HOSPADM

## 2023-08-27 RX ORDER — ACETAMINOPHEN 325 MG/1
650 TABLET ORAL EVERY 6 HOURS PRN
Status: DISCONTINUED | OUTPATIENT
Start: 2023-08-27 | End: 2023-09-01 | Stop reason: HOSPADM

## 2023-08-27 RX ORDER — FLUTICASONE FUROATE AND VILANTEROL 200; 25 UG/1; UG/1
1 POWDER RESPIRATORY (INHALATION) DAILY
Status: DISCONTINUED | OUTPATIENT
Start: 2023-08-27 | End: 2023-09-01 | Stop reason: HOSPADM

## 2023-08-27 RX ORDER — AMOXICILLIN 250 MG
1 CAPSULE ORAL 2 TIMES DAILY PRN
Status: DISCONTINUED | OUTPATIENT
Start: 2023-08-27 | End: 2023-09-01 | Stop reason: HOSPADM

## 2023-08-27 RX ORDER — FUROSEMIDE 10 MG/ML
40 INJECTION INTRAMUSCULAR; INTRAVENOUS EVERY 8 HOURS
Status: DISCONTINUED | OUTPATIENT
Start: 2023-08-27 | End: 2023-08-28

## 2023-08-27 RX ORDER — FUROSEMIDE 10 MG/ML
40 INJECTION INTRAMUSCULAR; INTRAVENOUS EVERY 12 HOURS
Status: DISCONTINUED | OUTPATIENT
Start: 2023-08-28 | End: 2023-08-27

## 2023-08-27 RX ORDER — ENOXAPARIN SODIUM 100 MG/ML
40 INJECTION SUBCUTANEOUS EVERY 12 HOURS SCHEDULED
Status: DISCONTINUED | OUTPATIENT
Start: 2023-08-27 | End: 2023-08-28

## 2023-08-27 RX ORDER — ENOXAPARIN SODIUM 100 MG/ML
40 INJECTION SUBCUTANEOUS ONCE
Status: COMPLETED | OUTPATIENT
Start: 2023-08-27 | End: 2023-08-27

## 2023-08-27 RX ORDER — IPRATROPIUM BROMIDE AND ALBUTEROL SULFATE 2.5; .5 MG/3ML; MG/3ML
3 SOLUTION RESPIRATORY (INHALATION)
Status: DISCONTINUED | OUTPATIENT
Start: 2023-08-27 | End: 2023-08-27

## 2023-08-27 RX ORDER — AMOXICILLIN 250 MG
2 CAPSULE ORAL 2 TIMES DAILY PRN
Status: DISCONTINUED | OUTPATIENT
Start: 2023-08-27 | End: 2023-09-01 | Stop reason: HOSPADM

## 2023-08-27 RX ORDER — LIDOCAINE 40 MG/G
CREAM TOPICAL
Status: DISCONTINUED | OUTPATIENT
Start: 2023-08-26 | End: 2023-09-01 | Stop reason: HOSPADM

## 2023-08-27 RX ORDER — ONDANSETRON 2 MG/ML
4 INJECTION INTRAMUSCULAR; INTRAVENOUS EVERY 6 HOURS PRN
Status: DISCONTINUED | OUTPATIENT
Start: 2023-08-27 | End: 2023-09-01 | Stop reason: HOSPADM

## 2023-08-27 RX ADMIN — BENZTROPINE MESYLATE 0.5 MG: 0.5 TABLET ORAL at 03:41

## 2023-08-27 RX ADMIN — Medication 1 MG: at 21:04

## 2023-08-27 RX ADMIN — ACETAMINOPHEN 650 MG: 325 TABLET ORAL at 20:56

## 2023-08-27 RX ADMIN — ENOXAPARIN SODIUM 40 MG: 40 INJECTION SUBCUTANEOUS at 09:53

## 2023-08-27 RX ADMIN — ENOXAPARIN SODIUM 40 MG: 40 INJECTION SUBCUTANEOUS at 21:03

## 2023-08-27 RX ADMIN — FUROSEMIDE 40 MG: 10 INJECTION, SOLUTION INTRAMUSCULAR; INTRAVENOUS at 15:58

## 2023-08-27 RX ADMIN — QUETIAPINE FUMARATE 150 MG: 100 TABLET ORAL at 03:40

## 2023-08-27 RX ADMIN — IPRATROPIUM BROMIDE AND ALBUTEROL SULFATE 3 ML: .5; 3 SOLUTION RESPIRATORY (INHALATION) at 12:59

## 2023-08-27 RX ADMIN — EMPAGLIFLOZIN 10 MG: 10 TABLET, FILM COATED ORAL at 15:58

## 2023-08-27 RX ADMIN — FLUTICASONE FUROATE AND VILANTEROL TRIFENATATE 1 PUFF: 200; 25 POWDER RESPIRATORY (INHALATION) at 17:09

## 2023-08-27 RX ADMIN — IOPAMIDOL 100 ML: 755 INJECTION, SOLUTION INTRAVENOUS at 01:02

## 2023-08-27 RX ADMIN — GABAPENTIN 100 MG: 100 CAPSULE ORAL at 21:04

## 2023-08-27 RX ADMIN — GABAPENTIN 100 MG: 100 CAPSULE ORAL at 09:53

## 2023-08-27 RX ADMIN — UMECLIDINIUM 1 PUFF: 62.5 AEROSOL, POWDER ORAL at 17:08

## 2023-08-27 RX ADMIN — CARVEDILOL 3.12 MG: 3.12 TABLET, FILM COATED ORAL at 16:04

## 2023-08-27 RX ADMIN — BENZTROPINE MESYLATE 0.5 MG: 0.5 TABLET ORAL at 21:04

## 2023-08-27 RX ADMIN — GABAPENTIN 400 MG: 100 CAPSULE ORAL at 21:03

## 2023-08-27 RX ADMIN — PERFLUTREN 2 ML: 6.52 INJECTION, SUSPENSION INTRAVENOUS at 11:00

## 2023-08-27 RX ADMIN — QUETIAPINE FUMARATE 150 MG: 100 TABLET ORAL at 21:04

## 2023-08-27 RX ADMIN — GABAPENTIN 400 MG: 100 CAPSULE ORAL at 03:42

## 2023-08-27 RX ADMIN — DULOXETINE HYDROCHLORIDE 60 MG: 60 CAPSULE, DELAYED RELEASE PELLETS ORAL at 09:53

## 2023-08-27 RX ADMIN — IPRATROPIUM BROMIDE AND ALBUTEROL SULFATE 3 ML: .5; 3 SOLUTION RESPIRATORY (INHALATION) at 03:27

## 2023-08-27 ASSESSMENT — ACTIVITIES OF DAILY LIVING (ADL)
ADLS_ACUITY_SCORE: 28
DIFFICULTY_EATING/SWALLOWING: YES
WALKING_OR_CLIMBING_STAIRS: OTHER (SEE COMMENTS)
WALKING_OR_CLIMBING_STAIRS_DIFFICULTY: YES
DRESS: 0-->INDEPENDENT
FALL_HISTORY_WITHIN_LAST_SIX_MONTHS: NO
SWALLOWING: 2-->DIFFICULTY SWALLOWING LIQUIDS/FOODS
ADLS_ACUITY_SCORE: 32
ADLS_ACUITY_SCORE: 28
TRANSFERRING: 0-->INDEPENDENT
ADLS_ACUITY_SCORE: 28
DOING_ERRANDS_INDEPENDENTLY_DIFFICULTY: YES
ADLS_ACUITY_SCORE: 32
WEAR_GLASSES_OR_BLIND: YES
TRANSFERRING: 0-->INDEPENDENT
EATING/SWALLOWING: OTHER (SEE COMMENTS)
ADLS_ACUITY_SCORE: 35
ADLS_ACUITY_SCORE: 32
EATING: 0-->INDEPENDENT
BATHING: 0-->INDEPENDENT
CONCENTRATING,_REMEMBERING_OR_MAKING_DECISIONS_DIFFICULTY: NO
EATING: 0-->INDEPENDENT
DRESS: 0-->INDEPENDENT
ADLS_ACUITY_SCORE: 38
TOILETING_ISSUES: NO
ADLS_ACUITY_SCORE: 38
ADLS_ACUITY_SCORE: 26
DRESSING/BATHING_DIFFICULTY: YES
VISION_MANAGEMENT: GLASSES FOR READING
ADLS_ACUITY_SCORE: 38
SWALLOWING: 2-->DIFFICULTY SWALLOWING LIQUIDS/FOODS
CHANGE_IN_FUNCTIONAL_STATUS_SINCE_ONSET_OF_CURRENT_ILLNESS/INJURY: YES
ADLS_ACUITY_SCORE: 28

## 2023-08-27 NOTE — PROGRESS NOTES
RESPIRATORY CARE NOTE     Gave ptprn Duoneb.  Breath sounds clear pre and post.  Discussed CPAP with pt.  She agreed to use CPAP in hospital.  No CPAPs are currently available for now;  we should have one available for tonight.  Pt is currently on 2L nasal cannula; SpO2 92%.  RT will continue to follow.      Edgar Chowdhury, RT

## 2023-08-27 NOTE — ED NOTES
Full report called to JERRELL Suazo on P3. All questions answered. Bed and nurse ready for patient.  
Medications

## 2023-08-27 NOTE — CONSULTS
[facility] team is asked to see Eva Parikh in consultation to evaluate new onset heart failure and pulmonary hypertension.      Assessment:     Severe LV dysfunction.  Etiology uncertain with no history of coronary artery disease, alcohol abuse, thyroid dysfunction, or excessive hypertension.  Likely a significant contributor to exertional dyspnea.  Absence of coronary calcium on yesterday's CT chest does not eliminate possibility of significant obstructive coronary disease  COPD with emphysematous changes, quit smoking 3 years ago.  Morbid obesity with obstructive sleep apnea, off of CPAP for several years.  Previously treated with nocturnal oxygen, off of that as well for at least weeks.  Could be contributing to a stress cardiomyopathy type picture.     Plan:     Supplemental oxygen  Initiate aggressive treatment regimen with IV furosemide, oral carvedilol, empagliflozin, followed by Entresto.  Would suggest stress MRI to look for scarring, exclude ischemic etiology     Current History:     Eva Parikh is a 52-year-old woman with history of COPD/asthma overlap syndrome, obstructive sleep apnea not using her CPAP with no previous history of cardiac disease.  She notes that over the last couple of months she has had progressive shortness of breath.  She ran out of nocturnal oxygen a couple of weeks ago as her machinery stopped working.  Her CPAP device was recalled several years ago and she has had chronically poorly restorative sleep since that time.  She does use her inhalers regularly and quit smoking 3 years ago, alcohol intake is rare.  Yesterday she noted worsening shortness of breath along with inframammary and lower back pains with coughing.  She presented to the hospital for further evaluation.  Troponin was marginally elevated.      Today echocardiogram showed severe LV dysfunction, and noted drop versus prior study.  She has no history of diabetes mellitus  "denies a history of hypertension, or hyperlipidemia.  She feels that her weight is stable versus a year ago.    Past Medical History:     Past Medical History:   Diagnosis Date    Anxiety     Arthritis     Asthma     COPD (chronic obstructive pulmonary disease) (H)     Depression      Sleep history: Not using CPAP for several years.  Poorly restorative with daytime sleepiness, occasionally naps, sleeps with the head of the bed elevated.  Past Surgical History:     Past Surgical History:   Procedure Laterality Date    ADENOIDECTOMY      TONSILLECTOMY         Family History:     Family History   Problem Relation Age of Onset    Diabetes Mother     Thyroid Disease Mother     Heart Disease Mother     Mental Illness Mother     Diabetes Maternal Grandmother     Heart Disease Maternal Grandmother     Bipolar Disorder Sister      Family history reviewed and is not pertinent to the chief complaint or presenting problem    Social History:    reports that she quit smoking about 3 years ago. Her smoking use included cigarettes and cigarettes. She smoked an average of 1 pack per day. She has never used smokeless tobacco. She reports that she does not drink alcohol and does not use drugs.  Exercise history: Minimal secondary to low back pain    Meds:     No current outpatient medications on file.       Allergies:   Amoxicillin, Mold [molds & smuts], Mold/mildew [molds & smuts], Other environmental allergy, Penicillins, and Pollen [pollen extract]    Review of Systems:   A 12 point comprehensive review of systems was negative except as noted in the current history.    Objective:      Physical Exam  Wt Readings from Last 3 Encounters:   08/27/23 113.3 kg (249 lb 11.2 oz)   08/29/22 113.4 kg (250 lb)   03/05/21 100.7 kg (222 lb)     Body mass index is 42.86 kg/m .  /59 (BP Location: Right arm)   Pulse 96   Temp 97.5  F (36.4  C) (Oral)   Resp 20   Ht 1.626 m (5' 4\")   Wt 113.3 kg (249 lb 11.2 oz)   SpO2 94%   BMI 42.86 " kg/m      General Appearance:  No apparent distress.  Disheveled, uncomfortable appearing  HEENT: No scleral icterus; the mucous membranes were pink and moist.  Conjunctiva bilaterally injected  Neck: Stout.  No cervical bruits, jugular venous distention, or thyromegaly.  Chest:The spine was straight. The chest was symmetric.  Lungs:  Respirations unlabored; the lungs are clear to auscultation.  Cardiovascular:    Nonpalpable point of maximal impulse. Auscultation reveals regular irst and second heart sounds with no murmurs, rubs, or gallops appreciated. Carotid, radial, and dorsalis pedal pulses are intact and symmetric.  Abdomen: Obese.  No organomegaly, masses, bruits, or tenderness. Bowels sounds are present  Extremities:   Thick.  Trace to 1+ pretibial edema  Skin:  No xanthelasma.  Neurologic: Mood and affect are appropriate. Speech is fluent.      EKG (personally reviewed): Sinus tachycardia 104 bpm.  Nonspecific IVCD, possible lateral infarct.  IVCD, decreased lateral wall voltage new versus 1/1/2020    Imaging   CT chest PE run yesterday:  CORONARY ARTERY CALCIFICATION: None.   1.  Negative for pulmonary embolism. Enlargement of the central pulmonary arteries can be seen with pulmonary arterial hypertension.  2.  Cardiac enlargement with small right and tiny left pleural effusions. Correlation for any signs of CHF or fluid overload.  3.  Mild groundglass opacities in the lower lungs favored to be due to atelectasis or edema with infiltrate felt to be less likely. Clinical correlation.  4.  Emphysematous changes in the lungs.  5.  Multiple tiny calcified and noncalcified bilateral pulmonary nodules are most compatible with prior granulomatous disease and similar to previous.      Cardiac diagnostics    Echo cardiogram today:  1. Left ventricular function is decreased. The ejection fraction is 15-20%  (severely reduced). There is severe global hypokinesia of the left ventricle.  2. The right ventricle is  "normal size. Mildly decreased right ventricular  systolic function  3. The left atrium is moderately dilated.  4. There is moderate (2+) mitral regurgitation. The mitral regurgitant jet is  eccentrically directed.  5. High RA pressure estimated at 15 mmHg or greater. Right ventricular  systolic pressure could not be approximated due to inadequate tricuspid  regurgitation.        Lab Review     Lab Results   Component Value Date    HGB 12.2 08/27/2023    HGB 15.4 07/10/2019     Lab Results   Component Value Date     08/27/2023     07/10/2019     Lab Results   Component Value Date    POTASSIUM 3.9 08/27/2023    POTASSIUM 4.0 03/12/2020    POTASSIUM 4.3 07/10/2019     Lab Results   Component Value Date    BUN 12.5 08/27/2023    BUN 17 03/12/2020    BUN 15 07/10/2019     Lab Results   Component Value Date    CR 0.87 08/27/2023    CR 0.74 07/10/2019     Lab Results   Component Value Date    CHOL 144 02/28/2020    HDL 41 02/28/2020    LDL 89 02/28/2020    TRIG 72 02/28/2020           Stephon Bond MD  [facility]  8/27/2023    Note created using Dragon voice recognition software.  Sound alike errors may have escaped editing.    Clinically Significant Risk Factors Present on Admission         # Hypocalcemia: Lowest Ca = 8.3 mg/dL in last 2 days, will monitor and replace as appropriate          # Severe Obesity: Estimated body mass index is 42.86 kg/m  as calculated from the following:    Height as of this encounter: 1.626 m (5' 4\").    Weight as of this encounter: 113.3 kg (249 lb 11.2 oz).                                  "

## 2023-08-27 NOTE — PROGRESS NOTES
08/27/23 1315   Appointment Info   Signing Clinician's Name / Credentials (OT) Chelsea Guerra OTR/L   Living Environment   People in Home child(kim), adult  (son)   Current Living Arrangements house   Home Accessibility stairs to enter home;stairs within home   Number of Stairs, Main Entrance 4   Number of Stairs, Within Home, Primary greater than 10 stairs   Stair Railings, Within Home, Primary railing on right side (ascending)   Living Environment Comments stairs to basement laundry   Self-Care   Regular Exercise No   Equipment Currently Used at Home none   Activity/Exercise/Self-Care Comment ind for BADLs   Instrumental Activities of Daily Living (IADL)   IADL Comments ind for IADLs, works from home   General Information   Onset of Illness/Injury or Date of Surgery 08/26/23   Referring Physician Jose Galvan MD   Patient/Family Therapy Goal Statement (OT) feel better, go home   Additional Occupational Profile Info/Pertinent History of Current Problem admitted with SOB, found to have new onset CHF PMH asthma overlap syndrome   Existing Precautions/Restrictions cardiac   Cognitive Status Examination   Affect/Mental Status (Cognitive) flat/blunted affect   Follows Commands WFL   Pain Assessment   Patient Currently in Pain No   Range of Motion Comprehensive   General Range of Motion no range of motion deficits identified   Strength Comprehensive (MMT)   General Manual Muscle Testing (MMT) Assessment no strength deficits identified   Bed Mobility   Bed Mobility No deficits identified   Transfers   Transfers sit-stand transfer;toilet transfer   Sit-Stand Transfer   Sit-Stand [place] (Transfers) supervision   Toilet Transfer   Type (Toilet Transfer) sit-stand;stand-sit   [place] Level (Toilet Transfer) supervision   Activities of Daily Living   BADL Assessment/Intervention lower body dressing;toileting   Lower Body Dressing Assessment/Training   [place] Level (Lower Body Dressing) supervision    Toileting   [place] Level (Toileting) supervision   Clinical Impression   Criteria for Skilled Therapeutic Interventions Met (OT) Yes, treatment indicated   OT Diagnosis dec functional mobility   OT Problem List-Impairments impacting ADL problems related to;activity tolerance impaired  (health management)   Assessment of Occupational Performance 3-5 Performance Deficits   Identified Performance Deficits household mobility, household management, meal preparation, community mobility   Planned Therapy Interventions (OT) IADL retraining;home program guidelines;progressive activity/exercise;risk factor education   Clinical Decision Making Complexity (OT) low complexity   Risk & Benefits of therapy have been explained evaluation/treatment results reviewed;participants included;patient   OT Total Evaluation Time   OT Eval, Low Complexity Minutes (35952) 10   OT Goals   Therapy Frequency (OT) Daily   OT Predicted Duration/Target Date for Goal Attainment 09/03/23   OT Goals Cardiac Phase 1   OT: Understanding of cardiac education to maximize quality of life, condition management, and health outcomes Patient;Verbalize;Demonstrate   OT: Perform aerobic activity with stable cardiovascular response intermittent;10 minutes   OT: Functional/aerobic ambulation tolerance with stable cardiovascular response in order to return to home and community environment Independent;200 feet   OT: Navigation of stairs simulating home set up with stable cardiovascular response in order to return to home and community environment Supervision/SBA;4 stairs   Self-Care/Home Management   Self-Care/Home Mgmt/ADL, Compensatory, Meal Prep Minutes (65922) 24   Symptoms Noted During/After Treatment (Meal Preparation/Planning Training) shortness of breath   Treatment Detail/Skilled Intervention Pt on commode with NA upon arrival. SpO2 with limited accuracy d/t tremors in digits but . Educ pt on pacing according to symptoms including SOB,  dizziness, and feelings of heart racing. IND sit > stand, pericares, and donning new brief with encouragement to complete seated instead of standing and leaning on sliding door for safety. HR up to 130 during standing - direct VC to sit d/t HR and rest with PLB. Asjusted SpO2 monitor - 88%. Educ pt on PLB. SpO2 improves tp 94% and HR decreases to 115 within 1 min of semi-amado position. Educ pt on stop light tool, daily weights, heart failure etiology, progressive exercise, and diet. Pt asking appropriate questions. Declines any further standing for walking in room d/t fatigue and SOB.   OT Discharge Planning   OT Plan issue walking progress, trial short walk, progress to lopez walk and stairs as able, review CHF educ   OT Discharge Recommendation (DC Rec) home with assist   OT Rationale for DC Rec pt lives with son who can compelte heavy household tasks as needed. ind for BADLs but very low activity tolerance.   OT Brief overview of current status ind toileting, reviewed CHF educ, declines any ambulation   Total Session Time   Timed Code Treatment Minutes 24   Total Session Time (sum of timed and untimed services) 34

## 2023-08-27 NOTE — PHARMACY-ADMISSION MEDICATION HISTORY
Pharmacist Admission Medication History    Admission medication history is complete. The information provided in this note is only as accurate as the sources available at the time of the update.    Medication reconciliation/reorder completed by provider prior to medication history? No    Information Source(s): Patient via in-person, Surescripts (fill history)    Pertinent Information:   Patient reports she is having a family member bring home supply of her Breo and Incruse inhalers to the hospital for inpatient use.    Changes made to PTA medication list:  Added:   Duloxetine 60 mg daily  Gabapentin 100 mg BID (in addition to 400 mg at bedtime dose)  Quetiapine (rx written for 100-200 mg at bedtime; patient reports taking 150 mg at bedtime)  OTC Citrucel supplement  Deleted:   Atenolol (no fill hx, pt did not report taking)  Prednisone 20 mg x7 days (therapy completed)  Venlafaxine 150 mg XR daily (replaced by duloxetine)  Changed:   Benztropine 2 mg BID --> taking 0.5 mg at bedtime    Medication Affordability:  Not including over the counter (OTC) medications, was there a time in the past 3 months when you did not take your medications as prescribed because of cost?: No    Allergies reviewed with patient and updates made in EHR: yes    Medication History Completed By: Nicollette McMann, RP 8/26/2023 11:20 PM    Prior to Admission medications    Medication Sig Last Dose Taking? Auth Provider Long Term End Date   albuterol (PROAIR HFA/PROVENTIL HFA/VENTOLIN HFA) 108 (90 Base) MCG/ACT inhaler Inhale 2 puffs into the lungs every 6 hours  at PRN Yes Reymundo Singh MD Yes    albuterol (PROVENTIL) (2.5 MG/3ML) 0.083% neb solution Take 1 vial (2.5 mg) by nebulization every 6 hours as needed for shortness of breath / dyspnea or wheezing 8/26/2023 Yes Reymundo Singh MD Yes    benztropine (COGENTIN) 0.5 MG tablet Take 0.5 mg by mouth At Bedtime 8/25/2023 at HS Yes Reported, Patient Yes    DULoxetine (CYMBALTA) 30 MG  capsule Take 60 mg by mouth daily 8/26/2023 at AM Yes Unknown, Entered By History No    fluticasone-vilanterol (BREO ELLIPTA) 200-25 MCG/INH inhaler Inhale 1 puff into the lungs daily 8/26/2023 at AM Yes Reymundo Singh MD     gabapentin (NEURONTIN) 100 MG capsule Take 100 mg by mouth 2 times daily 8/26/2023 at AM Yes Unknown, Entered By History Yes    gabapentin (NEURONTIN) 400 MG capsule Take 400 mg by mouth At Bedtime 8/25/2023 at HS Yes Unknown, Entered By History Yes    methylcellulose (CITRUCEL) 500 MG TABS tablet Take 500 mg by mouth daily 8/26/2023 at AM Yes Unknown, Entered By History     nicotine (NICORETTE) 4 MG lozenge Place 4 mg inside cheek as needed for smoking cessation  at PRN Yes Unknown, Entered By History     QUEtiapine (SEROQUEL) 100 MG tablet Take 150 mg by mouth At Bedtime 8/25/2023 at HS Yes Unknown, Entered By History No    umeclidinium (INCRUSE ELLIPTA) 62.5 MCG/INH inhaler Inhale 1 puff into the lungs daily 8/26/2023 at AM Yes Reymundo Singh MD

## 2023-08-27 NOTE — H&P
[facility]    History and Physical - Hospitalist Service       Date of Admission:  8/26/2023    Assessment & Plan      Eva Parikh is a 52 year old female admitted on 8/26/2023. She is admitted with acute hypercapnic respiratory failure likely related to a combination of untreated sleep apnea, COPD asthma overlap syndrome and probably new onset CHF.    New onset CHF/elevated BNP  -- Continue with diuresis  --Monitor blood pressure and renal function with diuresis  --Echocardiogram    NSTEMI/ elevated troponin  --Cycle troponin and follow up troponin trend to determine type of NSTEMI  --Aspirin and statin  --Pending trend of troponin require heparin drip    CTA chest suggestive of pulmonary artery hypertension  -- This may be the underlying reason for her worsening shortness of breath.  -- Underlying cardiomegaly we will get cardiology service on board and will defer to cardiologist for pulmonary consult      Acute hypercapnic respiratory failure/shortness of breath with hypercapnia  History untreated sleep apnea  History of COPD  --Continue supplemental oxygen  --We will try to reinitiate CPAP use  --DuoNebs  --Continue home inhalers  -- Does not appear to be in acute COPD exacerbation.  She is not wheezing on chest exam, holding off steroids for now      Transaminitis  --May likely be related to underlying new onset CHF/congestion  --We will trend for now      COVID ruled out    History anxiety  History of depression  --Continue home meds       Diet: 2 Gram Sodium Diet  DVT Prophylaxis: Enoxaparin (Lovenox) SQ  Brown Catheter: Not present  Lines: None     Cardiac Monitoring: ACTIVE order. Indication: Acute decompensated heart failure (48 hours)  Code Status: Full Code    Clinically Significant Risk Factors Present on Admission                       # Severe Obesity: Estimated body mass index is 42.91 kg/m  as calculated from the following:    Height as of this encounter: 1.626 m (5'  "4\").    Weight as of this encounter: 113.4 kg (250 lb).       # COPD: noted on problem list        Disposition Plan      Expected Discharge Date: 08/28/2023                  Jose Galvan MD  Hospitalist Service  [facility]  Securely message with [employer] (more info)  Text page via [employer] P[employer]/Directory     ______________________________________________________________________    Chief Complaint      sob    History is obtained from the patient    History of Present Illness   Eva Parikh is a 52 year old female with comorbid medical conditions as listed who had presented to the emergency room with complaints of worsening shortness of breath since July.  Patient of note has an asthma overlap syndrome and follows with pulmonology.  She endorses shortness of breath had progressively gotten worse.  Her O2 sat she noted I dropped to 88 while sitting at a place.  When she walks she notes a drop to the low 80s.  She had also noted intermittent ankle and lower extremity swelling.  She endorses shortness of breath gets worse with exertion and sometimes happens at rest.  She also describes orthopnea but denies PND.  She endorses a cough that is productive of clear sputum and cough is more than the usual/baseline.  She also endorses a chest pain localized underneath her left breast and sometimes going over to her back she however is not nauseous or lightheaded when she has this pain.  She however does endorse dry heaving that happens out of the blue's and sometimes when she is coughing.  He endorses she had used to use a CPAP however quit working there was a recall and she has not used it in a long time.    Hospitalist consulted for admission      Past Medical History    Past Medical History:   Diagnosis Date    Anxiety     Arthritis     Asthma     COPD (chronic obstructive pulmonary disease) (H)     Depression        Past Surgical History   Past Surgical History:   Procedure Laterality Date "    ADENOIDECTOMY      TONSILLECTOMY         Prior to Admission Medications   Prior to Admission Medications   Prescriptions Last Dose Informant Patient Reported? Taking?   DULoxetine (CYMBALTA) 30 MG capsule 8/26/2023 at AM Self Yes Yes   Sig: Take 60 mg by mouth daily   QUEtiapine (SEROQUEL) 100 MG tablet 8/25/2023 at HS Self Yes Yes   Sig: Take 150 mg by mouth At Bedtime   albuterol (PROAIR HFA/PROVENTIL HFA/VENTOLIN HFA) 108 (90 Base) MCG/ACT inhaler  at PRN Self No Yes   Sig: Inhale 2 puffs into the lungs every 6 hours   albuterol (PROVENTIL) (2.5 MG/3ML) 0.083% neb solution 8/26/2023 Self No Yes   Sig: Take 1 vial (2.5 mg) by nebulization every 6 hours as needed for shortness of breath / dyspnea or wheezing   benztropine (COGENTIN) 0.5 MG tablet 8/25/2023 at HS Self Yes Yes   Sig: Take 0.5 mg by mouth At Bedtime   fluticasone-vilanterol (BREO ELLIPTA) 200-25 MCG/INH inhaler 8/26/2023 at AM Self No Yes   Sig: Inhale 1 puff into the lungs daily   gabapentin (NEURONTIN) 100 MG capsule 8/26/2023 at AM Self Yes Yes   Sig: Take 100 mg by mouth 2 times daily   gabapentin (NEURONTIN) 400 MG capsule 8/25/2023 at HS Self Yes Yes   Sig: Take 400 mg by mouth At Bedtime   methylcellulose (CITRUCEL) 500 MG TABS tablet 8/26/2023 at AM  Yes Yes   Sig: Take 500 mg by mouth daily   nicotine (NICORETTE) 4 MG lozenge  at PRN Self Yes Yes   Sig: Place 4 mg inside cheek as needed for smoking cessation   umeclidinium (INCRUSE ELLIPTA) 62.5 MCG/INH inhaler 8/26/2023 at AM Self No Yes   Sig: Inhale 1 puff into the lungs daily      Facility-Administered Medications: None        Social History   I have reviewed this patient's social history and updated it with pertinent information if needed.  Social History     Tobacco Use    Smoking status: Former     Packs/day: 1.00     Types: Cigarettes     Quit date: 1/1/2020     Years since quitting: 3.6    Smokeless tobacco: Never    Tobacco comments:     Decreased from 2 ppd to 1 ppd since May  2014   Substance Use Topics    Alcohol use: No    Drug use: No         Family History   I have reviewed this patient's family history and updated it with pertinent information if needed.  Family History   Problem Relation Age of Onset    Diabetes Mother     Thyroid Disease Mother     Heart Disease Mother     Mental Illness Mother     Diabetes Maternal Grandmother     Heart Disease Maternal Grandmother     Bipolar Disorder Sister          Allergies   Allergies   Allergen Reactions    Amoxicillin Unknown     As kid had mold test and told allergic     Mold [Molds & Smuts] Unknown    Mold/Mildew [Molds & Smuts] Unknown    Other Environmental Allergy Unknown     DUST    Penicillins Unknown     As kid had mold test and told allergic     Pollen [Pollen Extract] Unknown        Physical Exam   Vital Signs: Temp: 97.5  F (36.4  C) Temp src: Temporal BP: 133/84 Pulse: 105   Resp: 25 SpO2: 90 % O2 Device: None (Room air)    Weight: 250 lbs 0 oz      General: Awake and alert, obese  Eyes: Pupils reactive to light  HENT: Atraumatic, oral mucosa moist  Neck: No masses, or swelling  Pulmonary: Good air entry, clear to auscultation, coarse breath sounds  CVS: Heart sounds 1 and 2 present, regular rhythm, rate, no murmur  GI/ Abdomen: Soft , not tender , not distended, bowel sounds ++  : No carvajal   CHERELLE: Normal inspection, no muscle spasm  Skin: No rash, skin intact  Extremities: Edema none  Neuro: Alert and oriented, follows command, speech normal, no focal weakness  Psych: Normal mood and affect      Medical Decision Making       75 MINUTES SPENT BY ME on the date of service doing chart review, history, exam, documentation & further activities per the note.      Data     I have personally reviewed the following data over the past 24 hrs:    8.3  \   12.9   / 194     137 103 12.3 /  148 (H)   3.9 22 0.86 \     ALT: 63 (H) AST: 46 (H) AP: 71 TBILI: 0.8   ALB: 3.8 TOT PROTEIN: 6.1 (L) LIPASE: N/A     Trop: 42 (H) BNP: 9,487 (H)        Imaging results reviewed over the past 24 hrs:   Recent Results (from the past 24 hour(s))   XR Chest Port 1 View    Narrative    EXAM: XR CHEST PORT 1 VIEW  LOCATION: [facility]  DATE: 8/26/2023    INDICATION: Shortness of breath.  COMPARISON: 01/01/2020.    FINDINGS: The heart is enlarged, significantly changed from the previous exam. There is no pulmonary edema. There are bands of scar and atelectasis at the lung bases. The upper lungs are clear. There is no pneumothorax.      Impression    IMPRESSION: There is new cardiomegaly. Bibasilar atelectasis and scarring.   CT Chest Pulmonary Embolism w Contrast    Narrative    EXAM: CT CHEST PULMONARY EMBOLISM W CONTRAST  LOCATION: [facility]  DATE: 8/27/2023    INDICATION: Dyspnea, hypoxia.  COMPARISON: 01/02/2020.  TECHNIQUE: CT chest pulmonary angiogram during arterial phase injection of IV contrast. Multiplanar reformats and MIP reconstructions were performed. Dose reduction techniques were used.   CONTRAST: 90 ml Iso 370.    FINDINGS:  ANGIOGRAM CHEST: Allowing for some mild motion artifact in the lower lungs, nothing definite for pulmonary embolism. Enlargement of the central pulmonary arteries suggest pulmonary arterial hypertension. Thoracic aorta is not opacified well enough to   evaluate for dissection. Negative for aneurysm. No CT evidence of right heart strain.    LUNGS AND PLEURA: Numerous benign calcified granulomas in the lungs as well as multiple tiny noncalcified pulmonary nodules also likely related to granulomatous disease. Emphysematous changes in the lungs. Mild hazy groundglass opacities in the lower   lungs favored to be due to atelectasis or edema versus less likely infiltrate. Clinical correlation. Scattered areas of subpleural linear scarring and/or atelectasis. Small right and tiny left pleural effusions.    MEDIASTINUM/AXILLAE: No adenopathy. Cardiac enlargement. Trace amount of pericardial  fluid. Esophagus is grossly negative.    CORONARY ARTERY CALCIFICATION: None.    UPPER ABDOMEN: Multiple benign-appearing hepatic cysts.    MUSCULOSKELETAL: Normal.      Impression    IMPRESSION:  1.  Negative for pulmonary embolism. Enlargement of the central pulmonary arteries can be seen with pulmonary arterial hypertension.    2.  Cardiac enlargement with small right and tiny left pleural effusions. Correlation for any signs of CHF or fluid overload.    3.  Mild groundglass opacities in the lower lungs favored to be due to atelectasis or edema with infiltrate felt to be less likely. Clinical correlation.    4.  Emphysematous changes in the lungs.    5.  Multiple tiny calcified and noncalcified bilateral pulmonary nodules are most compatible with prior granulomatous disease and similar to previous.

## 2023-08-27 NOTE — PLAN OF CARE
Heart Failure Care Map  GOALS TO BE MET BEFORE DISCHARGE:    1. Decrease congestion and/or edema with diuretic therapy to achieve near optimal volume status.     Dyspnea improved: No, further care required to meet this goal. Please explain Very SOB with activity   Edema improved: No, further care required to meet this goal. Please explain Has 2+ edema on LE        Last 24 hour I/O:   Intake/Output Summary (Last 24 hours) at 8/27/2023 0650  Last data filed at 8/27/2023 0529  Gross per 24 hour   Intake 60 ml   Output 2850 ml   Net -2790 ml           Net I/O and Weights since admission:   07/28 0700 - 08/27 0659  In: 60 [P.O.:60]  Out: 2850 [Urine:2850]  Net: -2790     Vitals:    08/26/23 2132 08/27/23 0132 08/27/23 0529   Weight: 113.4 kg (250 lb) 113.3 kg (249 lb 11.2 oz) 113.3 kg (249 lb 11.2 oz)       2.  O2 sats > 90% on room air, or at prior home O2 therapy level.      Able to wean O2 this shift to keep sats above 90%?: No, further care required to meet this goal. Please explain Using 3.5L oxygen via nasal cannula.    Does patient use Home O2? Yes-  1-1.5L oxygen at night          Current oxygenation status:   SpO2: 92 %     O2 Device: Nasal cannula, Oxygen Delivery: Others (comment) (3.5 LPM)    3.  Tolerates ambulation and mobility near baseline.     Ambulation: No, further care required to meet this goal. Please explain Pivots to bedside commode    Times patient ambulated with staff this shift: 0    Please review the Heart Failure Care Map for additional HF goal outcomes.    Lakeisha Ortiz RN  8/27/2023       Goal Outcome Evaluation: Patient transferred to P3 from ED around 1:30 am. Patient is aox4. Denied chest pain, dizziness, and lightheadedness. Patient is very SOB with activity. Patient desats to 86%, on RA. Patient placed on 3.5L oxygen via NC. SpO2 90-93% with 3.5L O2. Patient has 2+ edema on LE. Received IV lasix in ED. Has good output. BP soft. Continue to monitor. Patient educated on 1800 ml  fluid restriction. Call-light within reach. Bed alarm on for safety.     Tele: Sinus tachycardia with BBB and prolonged Qtc. Hr 100s-110s.

## 2023-08-27 NOTE — CONSULTS
NUTRITION EDUCATION      REASON FOR ASSESSMENT:  MD consult: Heart Failure - Dietitian to instruct patient on 2 gram sodium diet     NUTRITION HISTORY:  Information obtained from pt    Likes to salt her foods, even already salty foods such as cottage cheese. Uses a combination of fresh foods and convenience foods when cooking. Does like to eat pickles and olives sometimes.     CURRENT DIET:  2 gm sodium    NUTRITION DIAGNOSIS:  Food- and nutrition-related knowledge deficit R/t heart failure as evidenced by need for a low sodium diet.    INTERVENTIONS:    Nutrition Prescription: 2 gm sodium    Implementation:      *  Nutrition Education (Content):   A)  Provided handout low sodium nutrition therapy, label reading, low sodium seasoning tips   B)  Discussed Foods allowed and foods to avoid      *  Nutrition Education (Application):   A)  Discussed current eating habits and recommended alternative food choices      *  Anticipate good compliance      *  Diet Education - refer to Education Flowsheet    Goals:      *  Patient will verbalize understanding of diet      *  All of the above goals met during the education session    Follow Up/Monitoring:      *  Recommended Out-Patient Nutrition Referral, if further diet instructions are needed

## 2023-08-27 NOTE — ED PROVIDER NOTES
Emergency Department Encounter     Evaluation Date & Time:   8/26/2023  9:39 PM    CHIEF COMPLAINT:  Shortness of Breath      Triage Note:Pt states that she has hx of COPD and has been dealing with exacerbation of symptoms intermittently since the beginning of July. She states that she had a virtual appt with her PCP on July 3rd and was put on Zpak. She thought that things were improving. However, she is having increased shortness of breath, especially on exertion. She has been coughing more (tight, unproductive) and her nebs at home have not been very helpful.               ED COURSE & MEDICAL DECISION MAKING:     ED Course as of 08/26/23 2330   Sat Aug 26, 2023   2222 CBC reassuring. VBG pH 7.41.     2251 hsTrop 37, will get 2 hour delta.  BNP is grossly elevated and cardiomegaly on CXR with mild hypoxia and persistent cough. Could be new CHF and will diuresis with IV lasix, hospitalize for further evaluation.   2253 BNP grossly elevated to 9000+, which is all new and CXR with cardiomegaly.   2302 Pt re-evaluated and updated on all results, plan for hospitalization, IV diuresis, further inpatient evaluation.   2309 CXR (independent interpretation): +cardiomegaly with some increased interstitial markings   2328 I spoke with hospitalist, who accepts for cardiac tele, inpatient. Requests CT chest. Order placed.       Pt with a history of COPD/Asthma, follows with pulm and here for ongoing, interrmittently worse symptoms since July.  Pt reports intermittent increased dyspnea, cough and O2 sats that will drop at times. Pt reports resting RA O2 sats around 88-91% today, so she came in.  Pt has O2 at home, supposed to use at night, but has not been using. Pt uncertain if she could use other times as well.  She has some chest and back pain with coughing only. No prior cardiac or PE/DVT history.  Exam history of COPD/asthma exacerbation.  Will get labs to rule out additional pathology, treat with nebs, IV steroids and  reassess.  Do not feel PE is likely given exam/history and not ordering CTA chest at this point.    9:42 PM I met with the patient for the initial interview and physical examination. Discussed plan for treatment and workup in the ED. PPE: Provider wore gloves, and paper mask.      Medical Decision Making    History:  Supplemental history from: Documented in chart, if applicable and Family Member/Significant Other  External Record(s) reviewed: Documented in chart, if applicable. and Outpatient Record: 7/3/23 Pulmonology Telephone Call    Work Up:  Chart documentation includes differential considered and any EKGs or imaging independently interpreted by provider, where specified.  In additional to work up documented, I considered the following work up: Documented in chart, if applicable.    External consultation:  Discussion of management with another provider: Documented in chart, if applicable    Complicating factors:  Care impacted by chronic illness: Chronic Lung Disease and Mental Health  Care affected by social determinants of health: N/A    Disposition considerations: Admit.      At the conclusion of the encounter I discussed the results of all the tests and the disposition. The questions were answered. The patient or family acknowledged understanding and was agreeable with the care plan.      MEDICATIONS GIVEN IN THE EMERGENCY DEPARTMENT:  Medications   ipratropium - albuterol 0.5 mg/2.5 mg/3 mL (DUONEB) neb solution 3 mL (3 mLs Nebulization $Given 8/26/23 2208)   methylPREDNISolone sodium succinate (solu-MEDROL) injection 125 mg (125 mg Intravenous $Given 8/26/23 2207)   benzonatate (TESSALON) capsule 200 mg (200 mg Oral $Given 8/26/23 2207)   furosemide (LASIX) injection 40 mg (40 mg Intravenous $Given 8/26/23 2328)       NEW PRESCRIPTIONS STARTED AT TODAY'S ED VISIT:  New Prescriptions    No medications on file       HPI     Eva Parikh is a 52 year old female with a pertinent history of COPD,  anxiety, and asthma who presents to this ED via walk in for evaluation of shortness of breath.     The patient reports that she has been dealing with a COPD exacerbation since July. She states that she hasn't been able to walk around her house without becoming short of breath, which she was able to do before the exacerbation started. She states that her nebulizer provides relief for a few hours before her shortness of breath returns. Over the last few days she has been coughing more and started becoming lightheaded. Tonight she noticed that her oxygen saturation levels at rest had dropped to 89-91%, when they are usually 93-94%, prompting presentation to the ED. She endorses decreased appetite, lower chest pain which wraps around to her back which she believes is due to coughing, and new bilateral ankle swelling, with the left ankle being more swollen than the right. She notes that she has oxygen at home to use at night, but it is currently broken so she has not been using it. The patient denies a history of heart disease or blood clots, fever, recent travel or surgery, and any other symptoms or complaints at this time.     Per Chart Review:   7/3/23 The patient spoke with her pulmonologist regarding increased shortness of breath, cough and phlegm. She was prescribed prednisone and a Z-pack.     REVIEW OF SYSTEMS:  See HPI      Medical History     Past Medical History:   Diagnosis Date    Anxiety     Arthritis     Asthma     COPD (chronic obstructive pulmonary disease) (H)     Depression        Past Surgical History:   Procedure Laterality Date    ADENOIDECTOMY      TONSILLECTOMY         Family History   Problem Relation Age of Onset    Diabetes Mother     Thyroid Disease Mother     Heart Disease Mother     Mental Illness Mother     Diabetes Maternal Grandmother     Heart Disease Maternal Grandmother     Bipolar Disorder Sister        Social History     Tobacco Use    Smoking status: Former     Packs/day: 1.00      "Types: Cigarettes     Quit date: 1/1/2020     Years since quitting: 3.6    Smokeless tobacco: Never    Tobacco comments:     Decreased from 2 ppd to 1 ppd since May 2014   Substance Use Topics    Alcohol use: No    Drug use: No       albuterol (PROAIR HFA/PROVENTIL HFA/VENTOLIN HFA) 108 (90 Base) MCG/ACT inhaler  albuterol (PROVENTIL) (2.5 MG/3ML) 0.083% neb solution  benztropine (COGENTIN) 0.5 MG tablet  DULoxetine (CYMBALTA) 30 MG capsule  fluticasone-vilanterol (BREO ELLIPTA) 200-25 MCG/INH inhaler  gabapentin (NEURONTIN) 100 MG capsule  gabapentin (NEURONTIN) 400 MG capsule  methylcellulose (CITRUCEL) 500 MG TABS tablet  nicotine (NICORETTE) 4 MG lozenge  QUEtiapine (SEROQUEL) 100 MG tablet  umeclidinium (INCRUSE ELLIPTA) 62.5 MCG/INH inhaler        Physical Exam     Vitals:  BP (!) 130/94   Pulse 102   Temp 97.5  F (36.4  C) (Temporal)   Resp 23   Ht 1.626 m (5' 4\")   Wt 113.4 kg (250 lb)   SpO2 (!) 89%   BMI 42.91 kg/m      PHYSICAL EXAM:   Physical Exam  Vitals and nursing note reviewed.   Constitutional:       General: She is not in acute distress.     Appearance: Normal appearance.   HENT:      Head: Normocephalic and atraumatic.      Nose: Nose normal.      Mouth/Throat:      Mouth: Mucous membranes are moist.   Eyes:      Pupils: Pupils are equal, round, and reactive to light.   Neck:      Vascular: No JVD.   Cardiovascular:      Rate and Rhythm: Normal rate and regular rhythm.      Pulses: Normal pulses.           Radial pulses are 2+ on the right side and 2+ on the left side.        Dorsalis pedis pulses are 2+ on the right side and 2+ on the left side.   Pulmonary:      Effort: Pulmonary effort is normal. No respiratory distress.      Breath sounds: Decreased breath sounds (bilaterally) and wheezing (occasional) present.      Comments: Intermittent cough on exam.   Abdominal:      Palpations: Abdomen is soft.      Tenderness: There is no abdominal tenderness.   Musculoskeletal:      Cervical " back: Full passive range of motion without pain and neck supple.      Comments: No calf tenderness or swelling b/l   Skin:     General: Skin is warm.      Findings: No rash.   Neurological:      General: No focal deficit present.      Mental Status: She is alert. Mental status is at baseline.      Comments: Fluent speech, no acute lateralizing deficits   Psychiatric:         Mood and Affect: Mood normal.         Behavior: Behavior normal.         Results     LAB:  All pertinent labs reviewed and interpreted  Labs Ordered and Resulted from Time of ED Arrival to Time of ED Departure   BASIC METABOLIC PANEL - Abnormal       Result Value    Sodium 137      Potassium 3.9      Chloride 103      Carbon Dioxide (CO2) 22      Anion Gap 12      Urea Nitrogen 12.3      Creatinine 0.86      Calcium 8.6      Glucose 148 (*)     GFR Estimate 80     HEPATIC FUNCTION PANEL - Abnormal    Protein Total 6.1 (*)     Albumin 3.8      Bilirubin Total 0.8      Alkaline Phosphatase 71      AST 46 (*)     ALT 63 (*)     Bilirubin Direct 0.26     TROPONIN T, HIGH SENSITIVITY - Abnormal    Troponin T, High Sensitivity 37 (*)    NT PROBNP INPATIENT - Abnormal    N terminal Pro BNP Inpatient 9,487 (*)    BLOOD GAS VENOUS - Abnormal    pH Venous 7.41      pCO2 Venous 38      pO2 Venous 60 (*)     Bicarbonate Venous 24      Base Excess/Deficit -0.7      Oxyhemoglobin Venous 89.8 (*)     O2 Sat, Venous 91.2 (*)    MAGNESIUM - Normal    Magnesium 1.9     INFLUENZA A/B, RSV, & SARS-COV2 PCR - Normal    Influenza A PCR Negative      Influenza B PCR Negative      RSV PCR Negative      SARS CoV2 PCR Negative     CBC WITH PLATELETS AND DIFFERENTIAL    WBC Count 8.3      RBC Count 4.52      Hemoglobin 12.9      Hematocrit 40.8      MCV 90      MCH 28.5      MCHC 31.6      RDW 14.2      Platelet Count 194      % Neutrophils 75      % Lymphocytes 13      % Monocytes 9      % Eosinophils 1      % Basophils 1      % Immature Granulocytes 1      NRBCs per 100  WBC 0      Absolute Neutrophils 6.3      Absolute Lymphocytes 1.1      Absolute Monocytes 0.8      Absolute Eosinophils 0.1      Absolute Basophils 0.1      Absolute Immature Granulocytes 0.0      Absolute NRBCs 0.0     TROPONIN T, HIGH SENSITIVITY       RADIOLOGY:  XR Chest Port 1 View   Final Result   IMPRESSION: There is new cardiomegaly. Bibasilar atelectasis and scarring.      CT Chest Pulmonary Embolism w Contrast    (Results Pending)                ECG:  Sinus tach, rate 104, QRS and Qtc increased similar to previous    I have independently reviewed and interpreted the EKG(s) documented above     PROCEDURES:  Procedures:  None.       FINAL IMPRESSION:    ICD-10-CM    1. New onset of congestive heart failure (H)  I50.9       2. COPD exacerbation (H)  J44.1       3. Hypoxia  R09.02           0 minutes of critical care time      I, Melissa Pollack, am serving as a scribe to document services personally performed by Dr. Frederick Stephens, based on my observations and the provider's statements to me. I, Frederick Stephens, DO attest that Melissa Pollack is acting in a scribe capacity, has observed my performance of the services and has documented them in accordance with my direction.      Frederick Stephens DO  Emergency Medicine  [facility] EMERGENCY DEPARTMENT  8/26/2023  9:48 PM         Frederick Stephens MD  08/26/23 8613

## 2023-08-27 NOTE — PROGRESS NOTES
[facility]    Medicine Progress Note - Hospitalist Service    Date of Admission:  8/26/2023    Assessment & Plan      Eva Parikh is a 52 year old female with asthma overlap syndrome presented on 8/26/2023 for progressively worsening SOB for one month. Admitted for new onset CHF.    New onset HFrEF: Has significantly elevated BNP. Troponin was borderline high on admission and remains stable after admission. No prior history of CHF.  CTA chest shows cardiac enlargement and pulmonary hypertension.  Echocardiogram: severely reduced LVEF 15-20% with severe global hypokinesia of the left ventricle, mildly decreased right ventricular systolic function and moderate MR.   - Continue diuresis with Lasix 40 mg IV q12h  - Intake and output  - Daily weight  - Monitor creatinine and electrolytes  - Cardiology consulted and input pending    Acute hypoxemic respiratory failure: Oxygen saturation 86% on room air on admission. COVID tested negative. No pulmonary focal infiltrate.   - Started supplemental oxygen at 2 LPM. Wean as tolerated.    Obstructive sleep apnea: Used to use CPAP. But it got broken a few years ago and has not used it since.   - Supplemental oxygen as above  - Outpatient sleep study to follow up    History of COPD asthma overlap syndrome: CTA chest shows emphysematous changes in the lungs. Currently not on exacerbation. Continue home inhalers.    Transaminitis: likely be related to underlying new onset CHF/congestion. Monitor.     Anxiety and depression: Continue home meds    Obesity: BMI 42.86.        Diet: 2 Gram Sodium Diet    DVT Prophylaxis: Enoxaparin (Lovenox) SQ  Brown Catheter: Not present  Lines: None     Cardiac Monitoring: ACTIVE order. Indication: Acute decompensated heart failure (48 hours)  Code Status: Full Code      Clinically Significant Risk Factors Present on Admission          # Hypocalcemia: Lowest Ca = 8.3 mg/dL in last 2 days, will monitor and replace as  "appropriate           # Acute Respiratory Failure: Documented O2 saturation < 91%.  Continue supplemental oxygen as needed     # Severe Obesity: Estimated body mass index is 42.86 kg/m  as calculated from the following:    Height as of this encounter: 1.626 m (5' 4\").    Weight as of this encounter: 113.3 kg (249 lb 11.2 oz).       # COPD: noted on problem list        Disposition Plan      Expected Discharge Date: 08/28/2023                  Yazmin Rogers MD  Hospitalist Service  [facility]  Securely message with [employer] (more info)  Text page via [facility] Paging/Directory   ______________________________________________________________________    Interval History   Patient reports that she continues to have SOB. No wheezing. No chest pain.     Physical Exam   Vital Signs: Temp: 98.1  F (36.7  C) Temp src: Oral BP: 103/60 Pulse: 95   Resp: 22 SpO2: 90 % O2 Device: Nasal cannula Oxygen Delivery: Others (comment) (3.5)  Weight: 249 lbs 11.2 oz    General appearance: not in acute distress  HEENT: PERRL, EOMI  Lungs: Clear breath sounds in bilateral lung fields  Cardiovascular: Regular rate and rhythm, normal S1-S2  Abdomen: Soft, non tender, no distension  Musculoskeletal: No joint swelling  Skin: No rash. Mild bilateral edema  Neurology: AAO ×3.  Cranial nerves II - XII normal.  Normal muscle strength in all four extremities.     Medical Decision Making       47 MINUTES SPENT BY ME on the date of service doing chart review, history, exam, documentation & further activities per the note.      Data     I have personally reviewed the following data over the past 24 hrs:    7.7  \   12.2   / 163     139 102 12.5 /  179 (H)   3.9 26 0.87 \     ALT: 61 (H) AST: 36 AP: 69 TBILI: 0.9   ALB: 3.7 TOT PROTEIN: 6.0 (L) LIPASE: N/A     Trop: 29 (H) BNP: 9,487 (H)       Imaging results reviewed over the past 24 hrs:   Recent Results (from the past 24 hour(s))   XR Chest Port 1 View    Narrative    EXAM: XR " CHEST PORT 1 VIEW  LOCATION: [facility] [place]  DATE: 8/26/2023    INDICATION: Shortness of breath.  COMPARISON: 01/01/2020.    FINDINGS: The heart is enlarged, significantly changed from the previous exam. There is no pulmonary edema. There are bands of scar and atelectasis at the lung bases. The upper lungs are clear. There is no pneumothorax.      Impression    IMPRESSION: There is new cardiomegaly. Bibasilar atelectasis and scarring.   CT Chest Pulmonary Embolism w Contrast    Narrative    EXAM: CT CHEST PULMONARY EMBOLISM W CONTRAST  LOCATION: [facility] [place]  DATE: 8/27/2023    INDICATION: Dyspnea, hypoxia.  COMPARISON: 01/02/2020.  TECHNIQUE: CT chest pulmonary angiogram during arterial phase injection of IV contrast. Multiplanar reformats and MIP reconstructions were performed. Dose reduction techniques were used.   CONTRAST: 90 ml Iso 370.    FINDINGS:  ANGIOGRAM CHEST: Allowing for some mild motion artifact in the lower lungs, nothing definite for pulmonary embolism. Enlargement of the central pulmonary arteries suggest pulmonary arterial hypertension. Thoracic aorta is not opacified well enough to   evaluate for dissection. Negative for aneurysm. No CT evidence of right heart strain.    LUNGS AND PLEURA: Numerous benign calcified granulomas in the lungs as well as multiple tiny noncalcified pulmonary nodules also likely related to granulomatous disease. Emphysematous changes in the lungs. Mild hazy groundglass opacities in the lower   lungs favored to be due to atelectasis or edema versus less likely infiltrate. Clinical correlation. Scattered areas of subpleural linear scarring and/or atelectasis. Small right and tiny left pleural effusions.    MEDIASTINUM/AXILLAE: No adenopathy. Cardiac enlargement. Trace amount of pericardial fluid. Esophagus is grossly negative.    CORONARY ARTERY CALCIFICATION: None.    UPPER ABDOMEN: Multiple benign-appearing hepatic  cysts.    MUSCULOSKELETAL: Normal.      Impression    IMPRESSION:  1.  Negative for pulmonary embolism. Enlargement of the central pulmonary arteries can be seen with pulmonary arterial hypertension.    2.  Cardiac enlargement with small right and tiny left pleural effusions. Correlation for any signs of CHF or fluid overload.    3.  Mild groundglass opacities in the lower lungs favored to be due to atelectasis or edema with infiltrate felt to be less likely. Clinical correlation.    4.  Emphysematous changes in the lungs.    5.  Multiple tiny calcified and noncalcified bilateral pulmonary nodules are most compatible with prior granulomatous disease and similar to previous.   Echocardiogram Complete   Result Value    LVEF  15-20% (severely reduced)    [facility]    [id number]  [id number]  [id number]  [id number]^JESI^[place] [address]     Name: SUSANNA OTOOLE  MRN: [id number]  : 1970  Study Date: 2023 10:27 AM  Age: 52 yrs  Gender: Female  Patient Location: [facility]  Reason For Study: SOB  Ordering Physician: EDGARDO DENNISON  Performed By: AT     BSA: 2.1 m2  Height: 64 in  Weight: 249 lb  HR: 96  ______________________________________________________________________________  Procedure  Complete Echo Adult. Definity (NDC #90644-242) given intravenously.  ______________________________________________________________________________  Interpretation Summary     1. Left ventricular function is decreased. The ejection fraction is 15-20%  (severely reduced). There is severe global hypokinesia of the left ventricle.  2. The right ventricle is normal size. Mildly decreased right ventricular  systolic function  3. The left atrium is moderately dilated.  4. There is moderate (2+) mitral regurgitation. The mitral regurgitant jet is  eccentrically directed.  5. High RA pressure estimated at 15 mmHg or greater.     Results communicated to   [facility]  ______________________________________________________________________________  Left Ventricle  The left ventricle is severely dilated. Left ventricular function is  decreased. The ejection fraction is 15-20% (severely reduced). There is mild  eccentric left ventricular hypertrophy. There is severe global hypokinesia of  the left ventricle.     Right Ventricle  The right ventricle is normal size. Mildly decreased right ventricular  systolic function.     Atria  The left atrium is moderately dilated. Right atrial size is normal.     Mitral Valve  Mitral valve leaflets appear normal. There is moderate (2+) mitral  regurgitation. The mitral regurgitant jet is eccentrically directed.     Tricuspid Valve  Tricuspid valve leaflets appear normal. There is no evidence of tricuspid  stenosis or clinically significant tricuspid regurgitation. Right ventricular  systolic pressure could not be approximated due to inadequate tricuspid  regurgitation. No tricuspid regurgitation.     Aortic Valve  The aortic valve is trileaflet. Aortic valve leaflets appear normal. There is  no evidence of aortic stenosis or clinically significant aortic regurgitation.     Pulmonic Valve  The pulmonic valve is not well seen, but is grossly normal. This degree of  valvular regurgitation is within normal limits. There is trace pulmonic  valvular regurgitation.     Vessels  The aorta root is normal. IVC diameter >2.1 cm collapsing <50% with sniff  suggests a high RA pressure estimated at 15 mmHg or greater.     Pericardium  There is no pericardial effusion.     ______________________________________________________________________________  MMode/2D Measurements & Calculations  IVSd: 1.3 cm  LVIDd: 6.9 cm  LVIDs: 5.9 cm  LVPWd: 1.1 cm     FS: 14.0 %  LV mass(C)d: 385.5 grams  LV mass(C)dI: 179.6 grams/m2  Ao root diam: 2.7 cm  LA dimension: 5.0 cm  asc Aorta Diam: 3.5 cm  LA/Ao: 1.9  LVOT diam: 2.3 cm  LVOT area: 4.2 cm2  Ao root diam Index  (cm/m2): 1.3  asc Aorta Diam Index (cm/m2): 1.6  LA Volume Indexed (AL/bp): 36.9 ml/m2  RV Base: 4.0 cm  RWT: 0.31     TAPSE: 3.5 cm     Time Measurements  MM HR: 91.0 BPM     Doppler Measurements & Calculations  MV E max brain: 104.0 cm/sec  MV A max brain: 80.1 cm/sec  MV E/A: 1.3  MV dec slope: 1069 cm/sec2  MV dec time: 0.10 sec  Ao V2 max: 112.0 cm/sec  Ao max P.0 mmHg  Ao V2 mean: 81.5 cm/sec  Ao mean PG: 3.0 mmHg  Ao V2 VTI: 17.8 cm  JONEL(I,D): 3.4 cm2  JONEL(V,D): 3.6 cm2  LV V1 max PG: 3.7 mmHg  LV V1 max: 95.8 cm/sec  LV V1 VTI: 14.7 cm  MR PISA: 0.57 cm2  MR ERO: 0.06 cm2  MR volume: 7.4 ml  SV(LVOT): 61.1 ml  SI(LVOT): 28.4 ml/m2  AV Brain Ratio (DI): 0.86  JONEL Index (cm2/m2): 1.6  Lateral E/e': 9.8  RV S Brain: 10.6 cm/sec     ______________________________________________________________________________  Report approved by: Saji Valverde 2023 12:03 PM

## 2023-08-27 NOTE — ED TRIAGE NOTES
Pt states that she has hx of COPD and has been dealing with exacerbation of symptoms intermittently since the beginning of July. She states that she had a virtual appt with her PCP on July 3rd and was put on Zpak. She thought that things were improving. However, she is having increased shortness of breath, especially on exertion. She has been coughing more (tight, unproductive) and her nebs at home have not been very helpful.     Triage Assessment       Row Name 08/26/23 1727       Triage Assessment (Adult)    Airway WDL WDL       Respiratory WDL    Respiratory WDL cough    Cough Frequency infrequent    Cough Type nonproductive;tight       Skin Circulation/Temperature WDL    Skin Circulation/Temperature WDL WDL       Peripheral/Neurovascular WDL    Peripheral Neurovascular WDL WDL

## 2023-08-28 ENCOUNTER — APPOINTMENT (OUTPATIENT)
Dept: OCCUPATIONAL THERAPY | Facility: HOSPITAL | Age: 53
DRG: 291 | End: 2023-08-28
Payer: COMMERCIAL

## 2023-08-28 ENCOUNTER — TELEPHONE (OUTPATIENT)
Dept: CARDIOLOGY | Facility: CLINIC | Age: 53
End: 2023-08-28
Payer: COMMERCIAL

## 2023-08-28 DIAGNOSIS — I50.9 ACUTE DECOMPENSATED HEART FAILURE (H): Primary | ICD-10-CM

## 2023-08-28 LAB
ALBUMIN UR-MCNC: 70 MG/DL
ANION GAP SERPL CALCULATED.3IONS-SCNC: 11 MMOL/L (ref 7–15)
APPEARANCE UR: ABNORMAL
BILIRUB UR QL STRIP: NEGATIVE
BUN SERPL-MCNC: 17.8 MG/DL (ref 6–20)
CALCIUM SERPL-MCNC: 8.8 MG/DL (ref 8.6–10)
CHLORIDE SERPL-SCNC: 102 MMOL/L (ref 98–107)
COLOR UR AUTO: ABNORMAL
CREAT SERPL-MCNC: 1.1 MG/DL (ref 0.51–0.95)
DEPRECATED HCO3 PLAS-SCNC: 30 MMOL/L (ref 22–29)
GFR SERPL CREATININE-BSD FRML MDRD: 59 ML/MIN/1.73M2
GLUCOSE SERPL-MCNC: 110 MG/DL (ref 70–99)
GLUCOSE UR STRIP-MCNC: 1000 MG/DL
HGB UR QL STRIP: ABNORMAL
HYALINE CASTS: 39 /LPF
KETONES UR STRIP-MCNC: NEGATIVE MG/DL
LEUKOCYTE ESTERASE UR QL STRIP: ABNORMAL
MUCOUS THREADS #/AREA URNS LPF: PRESENT /LPF
NITRATE UR QL: NEGATIVE
PH UR STRIP: 5 [PH] (ref 5–7)
POTASSIUM SERPL-SCNC: 4 MMOL/L (ref 3.4–5.3)
RBC URINE: >182 /HPF
SODIUM SERPL-SCNC: 143 MMOL/L (ref 136–145)
SP GR UR STRIP: 1.02 (ref 1–1.03)
SQUAMOUS EPITHELIAL: 2 /HPF
UROBILINOGEN UR STRIP-MCNC: <2 MG/DL
WBC URINE: >182 /HPF

## 2023-08-28 PROCEDURE — 250N000013 HC RX MED GY IP 250 OP 250 PS 637: Performed by: INTERNAL MEDICINE

## 2023-08-28 PROCEDURE — 97110 THERAPEUTIC EXERCISES: CPT | Mod: GO

## 2023-08-28 PROCEDURE — 36415 COLL VENOUS BLD VENIPUNCTURE: CPT | Performed by: INTERNAL MEDICINE

## 2023-08-28 PROCEDURE — 97535 SELF CARE MNGMENT TRAINING: CPT | Mod: GO

## 2023-08-28 PROCEDURE — 250N000009 HC RX 250: Performed by: INTERNAL MEDICINE

## 2023-08-28 PROCEDURE — 250N000011 HC RX IP 250 OP 636: Mod: JZ | Performed by: INTERNAL MEDICINE

## 2023-08-28 PROCEDURE — 210N000001 HC R&B IMCU HEART CARE

## 2023-08-28 PROCEDURE — 99233 SBSQ HOSP IP/OBS HIGH 50: CPT | Performed by: INTERNAL MEDICINE

## 2023-08-28 PROCEDURE — 87086 URINE CULTURE/COLONY COUNT: CPT | Performed by: INTERNAL MEDICINE

## 2023-08-28 PROCEDURE — 80048 BASIC METABOLIC PNL TOTAL CA: CPT | Performed by: INTERNAL MEDICINE

## 2023-08-28 PROCEDURE — 94660 CPAP INITIATION&MGMT: CPT

## 2023-08-28 PROCEDURE — 81001 URINALYSIS AUTO W/SCOPE: CPT | Performed by: INTERNAL MEDICINE

## 2023-08-28 PROCEDURE — 999N000157 HC STATISTIC RCP TIME EA 10 MIN

## 2023-08-28 RX ORDER — BUMETANIDE 2 MG/1
2 TABLET ORAL DAILY
Status: DISCONTINUED | OUTPATIENT
Start: 2023-08-29 | End: 2023-08-31

## 2023-08-28 RX ORDER — CIPROFLOXACIN 500 MG/1
500 TABLET, FILM COATED ORAL EVERY 12 HOURS SCHEDULED
Status: DISCONTINUED | OUTPATIENT
Start: 2023-08-28 | End: 2023-08-30

## 2023-08-28 RX ADMIN — QUETIAPINE FUMARATE 150 MG: 100 TABLET ORAL at 21:02

## 2023-08-28 RX ADMIN — ENOXAPARIN SODIUM 40 MG: 40 INJECTION SUBCUTANEOUS at 08:03

## 2023-08-28 RX ADMIN — FUROSEMIDE 40 MG: 10 INJECTION, SOLUTION INTRAMUSCULAR; INTRAVENOUS at 08:24

## 2023-08-28 RX ADMIN — FUROSEMIDE 40 MG: 10 INJECTION, SOLUTION INTRAMUSCULAR; INTRAVENOUS at 00:05

## 2023-08-28 RX ADMIN — BENZTROPINE MESYLATE 0.5 MG: 0.5 TABLET ORAL at 21:01

## 2023-08-28 RX ADMIN — CARVEDILOL 3.12 MG: 3.12 TABLET, FILM COATED ORAL at 16:51

## 2023-08-28 RX ADMIN — GABAPENTIN 400 MG: 100 CAPSULE ORAL at 21:01

## 2023-08-28 RX ADMIN — GABAPENTIN 100 MG: 100 CAPSULE ORAL at 08:03

## 2023-08-28 RX ADMIN — FLUTICASONE FUROATE AND VILANTEROL TRIFENATATE 1 PUFF: 200; 25 POWDER RESPIRATORY (INHALATION) at 08:16

## 2023-08-28 RX ADMIN — IPRATROPIUM BROMIDE AND ALBUTEROL SULFATE 3 ML: .5; 3 SOLUTION RESPIRATORY (INHALATION) at 16:56

## 2023-08-28 RX ADMIN — EMPAGLIFLOZIN 10 MG: 10 TABLET, FILM COATED ORAL at 08:04

## 2023-08-28 RX ADMIN — DULOXETINE HYDROCHLORIDE 60 MG: 60 CAPSULE, DELAYED RELEASE PELLETS ORAL at 08:03

## 2023-08-28 RX ADMIN — CIPROFLOXACIN 500 MG: 500 TABLET, FILM COATED ORAL at 21:01

## 2023-08-28 RX ADMIN — GABAPENTIN 100 MG: 100 CAPSULE ORAL at 16:51

## 2023-08-28 RX ADMIN — UMECLIDINIUM 1 PUFF: 62.5 AEROSOL, POWDER ORAL at 08:06

## 2023-08-28 RX ADMIN — SACUBITRIL AND VALSARTAN 1 TABLET: 24; 26 TABLET, FILM COATED ORAL at 21:02

## 2023-08-28 ASSESSMENT — ACTIVITIES OF DAILY LIVING (ADL)
ADLS_ACUITY_SCORE: 39
ADLS_ACUITY_SCORE: 39
ADLS_ACUITY_SCORE: 35
ADLS_ACUITY_SCORE: 35
DEPENDENT_IADLS:: INDEPENDENT
ADLS_ACUITY_SCORE: 38
ADLS_ACUITY_SCORE: 38
ADLS_ACUITY_SCORE: 35
ADLS_ACUITY_SCORE: 38
ADLS_ACUITY_SCORE: 39
ADLS_ACUITY_SCORE: 38
ADLS_ACUITY_SCORE: 39
ADLS_ACUITY_SCORE: 38

## 2023-08-28 NOTE — PLAN OF CARE
Problem: Plan of Care - These are the overarching goals to be used throughout the patient stay.    Goal: Optimal Comfort and Wellbeing  Outcome: Progressing     Problem: Heart Failure  Goal: Stable Heart Rate and Rhythm  Outcome: Progressing   Goal Outcome Evaluation:       SR on tele with BBB and occasional PVCs. PRN tylenol given at HS for c/o mild HA; effective.

## 2023-08-28 NOTE — CONSULTS
Care Management Initial Consult    General Information  Assessment completed with: Patient,    Type of CM/SW Visit: Initial Assessment    Primary Care Provider verified and updated as needed: Yes   Readmission within the last 30 days: no previous admission in last 30 days      Reason for Consult: discharge planning  Advance Care Planning: Advance Care Planning Reviewed: no concerns identified          Communication Assessment  Patient's communication style: spoken language (English or Bilingual)    Hearing Difficulty or Deaf: yes   Wear Glasses or Blind: yes    Cognitive  Cognitive/Neuro/Behavioral: .WDL except, level of consciousness  Level of Consciousness: lethargic  Arousal Level: arouses to voice  Orientation: oriented x 4  Mood/Behavior: calm, cooperative  Best Language: 0 - No aphasia  Speech: clear    Living Environment:   People in home: child(kim), adult  Son  Current living Arrangements: house      Able to return to prior arrangements:         Family/Social Support:  Care provided by: self  Provides care for: no one  Marital Status: Single  Children          Description of Support System: Supportive, Involved         Current Resources:   Patient receiving home care services: No     Community Resources: None  Equipment currently used at home: none  Supplies currently used at home: Oxygen Tubing/Supplies, Other (CPAP)    Employment/Financial:  Employment Status: employed full-time        Financial Concerns:             Does the patient's insurance plan have a 3 day qualifying hospital stay waiver?  No    Lifestyle & Psychosocial Needs:  Social Determinants of Health     Tobacco Use: Medium Risk (8/26/2023)    Patient History     Smoking Tobacco Use: Former     Smokeless Tobacco Use: Never     Passive Exposure: Not on file   Alcohol Use: Not on file   Financial Resource Strain: Not on file   Food Insecurity: Not on file   Transportation Needs: Not on file   Physical Activity: Not on file   Stress: Not on  file   Social Connections: Not on file   Intimate Partner Violence: Not on file   Depression: Not on file   Housing Stability: Not on file       Functional Status:  Prior to admission patient needed assistance:   Dependent ADLs:: Independent  Dependent IADLs:: Independent       Mental Health Status:          Chemical Dependency Status:                Values/Beliefs:  Spiritual, Cultural Beliefs, [employer]ism Practices, Values that affect care:                 Additional Information:  Assessed. Pt lives in a house with her son. She is independent with ADLs and IADLs. Still works full time. Pt states she has home O2 with [employer], and CPAP that she uses for bedtime, but it has not been working for several months. Son to transport at discharge.     CM did call [employer] [facility] [employer] as pt told CM that she was in the process of having someone come out and fix her equipment, per [employer] they do not have record that pt has home O2 with them. CM will need to speak to pt again to see who supplies her O2.    Kena Melton RN

## 2023-08-28 NOTE — PROGRESS NOTES
HEART CARE NOTE          Assessment/Recommendations     1. Severe Biventricular failure c/b severe ADHF  Assessment / Plan  Nearing euvolemia on physical exam - will transition to oral diuretic regimen and continue to monitor renal function/repeat BMP, UOP and hemodynamics closely   GDMT as detailed below    Current Pharmacotherapy AHA Guideline-Directed Medical Therapy   Sacubitril- valsartan 24-26 mg BID Lisinopril 20 mg twice daily   Carvedilol 3.125  mg twice daily Carvedilol 25 mg twice daily   Spironolactone not started Spironolactone 25 mg once daily   Hydralazine NA Hydralazine 100 mg three times daily   Isosorbide dinitrate NA Isosorbide dinitrate 40 mg three times daily   SGLT2 inhibitor:Dapagliflozin/Empagliflozin - not started Dapagliflozin or Empagliflozin 10 mg daily     2. Valvular heart disease  Assessment / Plan  Moderate MR- routine imagine surveillance indicated    3. Acute hypoxic respiratory failure  Assessment / Plan  Likely 2/2 ADHF/cardiogenic pulmonary edema - diuresis as above    4. SAM  Assessment / Plan  Management per primary team - plan for outpatient sleep study    Plan of care discussed on August 28, 2023 with patient at bedside, and primary team overseeing patient's care    60 minutes spent reviewing prior records (including documentation, laboratory studies, cardiac testing/imaging), history and physical exam, planning, and subsequent documentation.      History of Present Illness/Subjective    Ms. Eva Parikh is a 52 year old female with a PMHx significant for (per Dr. Espino's note) COPD/asthma overlap syndrome, obstructive sleep apnea not using her CPAP with no previous history of cardiac disease found to be in new onset ADHF.    Today, Mrs. Parikh denies any acute cardiac events or complaints; Management plan as detailed above    ECG: personally reviewed; sinus tachycardia.    ECHO (personnaly Reviewed):   1. Left ventricular function is decreased. The ejection  "fraction is 15-20%  (severely reduced). There is severe global hypokinesia of the left ventricle.  2. The right ventricle is normal size. Mildly decreased right ventricular  systolic function  3. The left atrium is moderately dilated.  4. There is moderate (2+) mitral regurgitation. The mitral regurgitant jet is  eccentrically directed.  5. High RA pressure estimated at 15 mmHg or greater.    Telemetry: personally reviewed August 28, 2023; notable for NSR     Lab results: personally reviewed August 28, 2023; notable for slightly elevated crt    Medical history and pertinent documents reviewed in Care Everywhere please where applicable see details above        Physical Examination Review of Systems   BP 99/66 (BP Location: Right arm)   Pulse 98   Temp 97.8  F (36.6  C) (Oral)   Resp 20   Ht 1.626 m (5' 4\")   Wt 110.6 kg (243 lb 12.8 oz)   SpO2 99%   BMI 41.85 kg/m    Body mass index is 41.85 kg/m .  Wt Readings from Last 3 Encounters:   08/28/23 110.6 kg (243 lb 12.8 oz)   08/29/22 113.4 kg (250 lb)   03/05/21 100.7 kg (222 lb)     General Appearance:   no distress, normal body habitus   ENT/Mouth: membranes moist, no oral lesions or bleeding gums.      EYES:  no scleral icterus, normal conjunctivae   Neck: no carotid bruits or thyromegaly   Chest/Lungs:   lungs are clear to auscultation, no rales or wheezing, equal chest wall expansion    Cardiovascular:   Regular. Normal first and second heart sounds with +MELANIE; no murmurs, rubs, or gallops; the carotid, radial and posterior tibial pulses are intact, no JVD, trace LE edema bilaterally    Abdomen:  no organomegaly, masses, bruits, or tenderness; bowel sounds are present   Extremities: no cyanosis or clubbing   Skin: no xanthelasma, warm.    Neurologic: NAD     Psychiatric: alert and oriented x3, calm     A complete 10 systems ROS was reviewed  And is negative except what is listed in the HPI.          Medical History  Surgical History Family History Social " History   Past Medical History:   Diagnosis Date    Anxiety     Arthritis     Asthma     COPD (chronic obstructive pulmonary disease) (H)     Depression     Past Surgical History:   Procedure Laterality Date    ADENOIDECTOMY      TONSILLECTOMY      no family history of premature coronary artery disease Social History     Socioeconomic History    Marital status: Single     Spouse name: Not on file    Number of children: 1    Years of education: HS    Highest education level: Not on file   Occupational History    Not on file   Tobacco Use    Smoking status: Former     Packs/day: 1.00     Types: Cigarettes     Quit date: 1/1/2020     Years since quitting: 3.6    Smokeless tobacco: Never    Tobacco comments:     Decreased from 2 ppd to 1 ppd since May 2014   Substance and Sexual Activity    Alcohol use: No    Drug use: No    Sexual activity: Not Currently   Other Topics Concern    Not on file   Social History Narrative    Lives on her own.     Social Determinants of Health     Financial Resource Strain: Not on file   Food Insecurity: Not on file   Transportation Needs: Not on file   Physical Activity: Not on file   Stress: Not on file   Social Connections: Not on file   Intimate Partner Violence: Not on file   Housing Stability: Not on file           Lab Results    Chemistry/lipid CBC Cardiac Enzymes/BNP/TSH/INR   Lab Results   Component Value Date    CHOL 144 02/28/2020    HDL 41 (L) 02/28/2020    TRIG 72 02/28/2020    BUN 17.8 08/28/2023     08/28/2023    CO2 30 (H) 08/28/2023    Lab Results   Component Value Date    WBC 7.7 08/27/2023    HGB 12.2 08/27/2023    HCT 39.6 08/27/2023    MCV 91 08/27/2023     08/27/2023    Lab Results   Component Value Date    TROPONINI 0.02 01/01/2020    BNP 16 01/01/2020    TSH 0.45 08/27/2023     Lab Results   Component Value Date    TROPONINI 0.02 01/01/2020          Weight:    Wt Readings from Last 3 Encounters:   08/28/23 110.6 kg (243 lb 12.8 oz)   08/29/22 113.4 kg  (250 lb)   03/05/21 100.7 kg (222 lb)       Allergies  Allergies   Allergen Reactions    Amoxicillin Unknown     As kid had mold test and told allergic     Mold [Molds & Smuts] Unknown    Mold/Mildew [Molds & Smuts] Unknown    Other Environmental Allergy Unknown     DUST    Penicillins Unknown     As kid had mold test and told allergic     Pollen [Pollen Extract] Unknown         Surgical History  Past Surgical History:   Procedure Laterality Date    ADENOIDECTOMY      TONSILLECTOMY         Social History  Tobacco:   History   Smoking Status    Former    Packs/day: 1.00    Types: Cigarettes, Cigarettes    Quit date: 1/1/2020   Smokeless Tobacco    Never     Comment: Decreased from 2 ppd to 1 ppd since May 2014    Alcohol:   Social History    Substance and Sexual Activity      Alcohol use: No   Illicit Drugs:   History   Drug Use No       Family History  Family History   Problem Relation Age of Onset    Diabetes Mother     Thyroid Disease Mother     Heart Disease Mother     Mental Illness Mother     Diabetes Maternal Grandmother     Heart Disease Maternal Grandmother     Bipolar Disorder Sister           Lauren Vázquez MD on 8/28/2023      cc: Reymundo Avila

## 2023-08-28 NOTE — PLAN OF CARE
Heart Failure Care Map  GOALS TO BE MET BEFORE DISCHARGE:    1. Decrease congestion and/or edema with diuretic therapy to achieve near optimal volume status.     Dyspnea improved: No, further care required to meet this goal. Please explain Has SOB with activity. Improving per pt    Edema improved: No, further care required to meet this goal. Please explain Has trace edema. Edema improving.         Last 24 hour I/O:   Intake/Output Summary (Last 24 hours) at 8/28/2023 0617  Last data filed at 8/28/2023 0343  Gross per 24 hour   Intake 875 ml   Output 1500 ml   Net -625 ml           Net I/O and Weights since admission:   07/29 0700 - 08/28 0659  In: 935 [P.O.:935]  Out: 4350 [Urine:4350]  Net: -3415     Vitals:    08/26/23 2132 08/27/23 0132 08/27/23 0529 08/28/23 0339   Weight: 113.4 kg (250 lb) 113.3 kg (249 lb 11.2 oz) 113.3 kg (249 lb 11.2 oz) 110.6 kg (243 lb 12.8 oz)       2.  O2 sats > 90% on room air, or at prior home O2 therapy level.      Able to wean O2 this shift to keep sats above 90%?: No, further care required to meet this goal. Please explain Using cpap with 3L oxygen bled in at night. Uses 3L via nasal cannula when awake    Does patient use Home O2? Yes-  Occasionally using 1-1.5L O2 at night. Patient has not been using it for a while due to issues with oxygen tank.            Current oxygenation status:   SpO2: 99 %     O2 Device: BiPAP/CPAP, Oxygen Delivery: 3 LPM    3.  Tolerates ambulation and mobility near baseline.     Ambulation: No, further care required to meet this goal. Please explain Has SOB with activity.    Times patient ambulated with staff this shift: 0    Please review the Heart Failure Care Map for additional HF goal outcomes.    Lakeisha Ortiz RN  8/28/2023       Goal Outcome Evaluation:  Patient is aox4. Patient denied pain. Has sinus tachycardia with BBB and prolonged Qtc. Has occasional PVCs. Hr 100s. Patient's getting 40 mg IV lasix q8 hrs. Has good output. BP soft.  Continue to monitor. Cr 1.10  today. Cr trending up. Continue to monitor. Patient up to bedside commode with SBA. Call-light within reach. Bed alarm on for safety.

## 2023-08-28 NOTE — PLAN OF CARE
Pt is alert and oriented x3, able to communicate needs, standby assist. Lung sounds had fine crackles in RML and RLL this morning, no crackles heard at noon, fine crackles heard again after lunch, Pt had 3 cans of soda during lunch. Pt has been sob with activity. O2 weaned down to 1L with sats in mid 90s. Pt had new IV placed in left forearm after previous was accidentally removed by Pt. IV furosemide stopped and starting PO bumex tomorrow morning. PRN duoneb given this afternoon with some improvement for sob.

## 2023-08-28 NOTE — PROGRESS NOTES
Notified Dr. Rogers of patient voiding blood tinged Elsi urine.  Dr. Rogers discontinued Lovenox and ordered for a UA.    Notified Dr. Rogers of UA results.

## 2023-08-28 NOTE — PROGRESS NOTES
[facility]    Medicine Progress Note - Hospitalist Service    Date of Admission:  8/26/2023    Assessment & Plan      Eva Parikh is a 52 year old female with asthma overlap syndrome presented on 8/26/2023 for progressively worsening SOB for one month. Admitted for new onset CHF.    New onset HFrEF: Has significantly elevated BNP. Troponin was borderline high on admission and remains stable after admission. No prior history of CHF.  CTA chest shows cardiac enlargement and pulmonary hypertension.  Echocardiogram: severely reduced LVEF 15-20% with severe global hypokinesia of the left ventricle, mildly decreased right ventricular systolic function and moderate MR.   - Was on diuresis with Lasix 40 mg IV q12h. Switched to bumex today  - Intake and output  - Daily weight  - Monitor creatinine and electrolytes  - Cardiology consulted and input appreciated: May need stress MRI    Acute hypoxemic respiratory failure: Oxygen saturation 86% on room air on admission. COVID tested negative. No pulmonary focal infiltrate.   - Started supplemental oxygen at 2 LPM. Wean as tolerated.    Obstructive sleep apnea: Used to use CPAP. But it got broken a few years ago and has not used it since.   - Supplemental oxygen as above  - Outpatient sleep study to follow up    History of COPD asthma overlap syndrome: CTA chest shows emphysematous changes in the lungs. Currently not on exacerbation. Continue home inhalers.    Transaminitis: likely be related to underlying new onset CHF/congestion. Monitor.     UTI: developed blood tinged urine and dysuria on 8/28.UA indicates UTI. Start ciprofloxacin. Follow up urine culture result.     Anxiety and depression: Continue home meds    Obesity: BMI 42.86.        Diet: 2 Gram Sodium Diet    DVT Prophylaxis: Enoxaparin (Lovenox) subcutaneous discontinued due to hematuria.  Brown Catheter: Not present  Lines: None     Cardiac Monitoring: ACTIVE order. Indication: Acute  "decompensated heart failure (48 hours)  Code Status: Full Code      Clinically Significant Risk Factors          # Hypocalcemia: Lowest Ca = 8.3 mg/dL in last 2 days, will monitor and replace as appropriate          # Acute heart failure with reduced ejection fraction: last echo with EF <40% and receiving IV diuretics       # Severe Obesity: Estimated body mass index is 41.85 kg/m  as calculated from the following:    Height as of this encounter: 1.626 m (5' 4\").    Weight as of this encounter: 110.6 kg (243 lb 12.8 oz)., PRESENT ON ADMISSION            Disposition Plan      Expected Discharge Date: 08/29/2023      Destination: home            Yazmin Rogers MD  Hospitalist Service  [facility]  Securely message with [employer] (more info)  Text page via [employer] Paging/Directory   ______________________________________________________________________    Interval History   Patient reports no SOB while resting in bed. She has not got ut of bed since admission. Patient developed some hematuria with dysuria today.     Physical Exam   Vital Signs: Temp: 98.1  F (36.7  C) Temp src: Axillary BP: 109/63 Pulse: 109   Resp: 22 SpO2: 97 % O2 Device: Nasal cannula Oxygen Delivery: 3 LPM  Weight: 243 lbs 12.8 oz    General appearance: not in acute distress  HEENT: PERRL, EOMI  Lungs: Clear breath sounds in bilateral lung fields  Cardiovascular: Regular rate and rhythm, normal S1-S2  Abdomen: Soft, non tender, no distension  Musculoskeletal: No joint swelling  Skin: No rash. Mild bilateral edema  Neurology: AAO ×3.  Cranial nerves II - XII normal.  Normal muscle strength in all four extremities.     Medical Decision Making       45 MINUTES SPENT BY ME on the date of service doing chart review, history, exam, documentation & further activities per the note.      Data     I have personally reviewed the following data over the past 24 hrs:    N/A  \   N/A   / N/A     143 102 17.8 /  110 (H)   4.0 30 (H) 1.10 (H) \ "     Imaging results reviewed over the past 24 hrs:   No results found for this or any previous visit (from the past 24 hour(s)).

## 2023-08-29 ENCOUNTER — APPOINTMENT (OUTPATIENT)
Dept: OCCUPATIONAL THERAPY | Facility: HOSPITAL | Age: 53
DRG: 291 | End: 2023-08-29
Payer: COMMERCIAL

## 2023-08-29 LAB
ANION GAP SERPL CALCULATED.3IONS-SCNC: 9 MMOL/L (ref 7–15)
BACTERIA UR CULT: ABNORMAL
BUN SERPL-MCNC: 17.9 MG/DL (ref 6–20)
CALCIUM SERPL-MCNC: 8.7 MG/DL (ref 8.6–10)
CHLORIDE SERPL-SCNC: 99 MMOL/L (ref 98–107)
CREAT SERPL-MCNC: 0.99 MG/DL (ref 0.51–0.95)
DEPRECATED HCO3 PLAS-SCNC: 30 MMOL/L (ref 22–29)
GFR SERPL CREATININE-BSD FRML MDRD: 67 ML/MIN/1.73M2
GLUCOSE SERPL-MCNC: 87 MG/DL (ref 70–99)
PLATELET # BLD AUTO: 174 10E3/UL (ref 150–450)
POTASSIUM SERPL-SCNC: 3.7 MMOL/L (ref 3.4–5.3)
SODIUM SERPL-SCNC: 138 MMOL/L (ref 136–145)

## 2023-08-29 PROCEDURE — 250N000013 HC RX MED GY IP 250 OP 250 PS 637: Performed by: INTERNAL MEDICINE

## 2023-08-29 PROCEDURE — 85049 AUTOMATED PLATELET COUNT: CPT | Performed by: INTERNAL MEDICINE

## 2023-08-29 PROCEDURE — 94660 CPAP INITIATION&MGMT: CPT

## 2023-08-29 PROCEDURE — 999N000157 HC STATISTIC RCP TIME EA 10 MIN

## 2023-08-29 PROCEDURE — 97535 SELF CARE MNGMENT TRAINING: CPT | Mod: GO

## 2023-08-29 PROCEDURE — 97110 THERAPEUTIC EXERCISES: CPT | Mod: GO

## 2023-08-29 PROCEDURE — 36415 COLL VENOUS BLD VENIPUNCTURE: CPT | Performed by: INTERNAL MEDICINE

## 2023-08-29 PROCEDURE — 99233 SBSQ HOSP IP/OBS HIGH 50: CPT | Performed by: INTERNAL MEDICINE

## 2023-08-29 PROCEDURE — 250N000011 HC RX IP 250 OP 636: Mod: JZ | Performed by: INTERNAL MEDICINE

## 2023-08-29 PROCEDURE — 210N000001 HC R&B IMCU HEART CARE

## 2023-08-29 PROCEDURE — 80048 BASIC METABOLIC PNL TOTAL CA: CPT | Performed by: INTERNAL MEDICINE

## 2023-08-29 RX ORDER — ENOXAPARIN SODIUM 100 MG/ML
40 INJECTION SUBCUTANEOUS EVERY 12 HOURS
Status: DISCONTINUED | OUTPATIENT
Start: 2023-08-29 | End: 2023-09-01 | Stop reason: HOSPADM

## 2023-08-29 RX ADMIN — CIPROFLOXACIN 500 MG: 500 TABLET, FILM COATED ORAL at 20:10

## 2023-08-29 RX ADMIN — SACUBITRIL AND VALSARTAN 1 TABLET: 24; 26 TABLET, FILM COATED ORAL at 20:09

## 2023-08-29 RX ADMIN — ENOXAPARIN SODIUM 40 MG: 40 INJECTION SUBCUTANEOUS at 20:10

## 2023-08-29 RX ADMIN — GABAPENTIN 400 MG: 100 CAPSULE ORAL at 20:10

## 2023-08-29 RX ADMIN — EMPAGLIFLOZIN 10 MG: 10 TABLET, FILM COATED ORAL at 08:34

## 2023-08-29 RX ADMIN — BENZTROPINE MESYLATE 0.5 MG: 0.5 TABLET ORAL at 20:10

## 2023-08-29 RX ADMIN — DULOXETINE HYDROCHLORIDE 60 MG: 60 CAPSULE, DELAYED RELEASE PELLETS ORAL at 08:27

## 2023-08-29 RX ADMIN — UMECLIDINIUM 1 PUFF: 62.5 AEROSOL, POWDER ORAL at 08:30

## 2023-08-29 RX ADMIN — BUMETANIDE 2 MG: 2 TABLET ORAL at 08:26

## 2023-08-29 RX ADMIN — FLUTICASONE FUROATE AND VILANTEROL TRIFENATATE 1 PUFF: 200; 25 POWDER RESPIRATORY (INHALATION) at 08:30

## 2023-08-29 RX ADMIN — GABAPENTIN 100 MG: 100 CAPSULE ORAL at 16:19

## 2023-08-29 RX ADMIN — QUETIAPINE FUMARATE 150 MG: 100 TABLET ORAL at 20:09

## 2023-08-29 RX ADMIN — GABAPENTIN 100 MG: 100 CAPSULE ORAL at 08:28

## 2023-08-29 RX ADMIN — CIPROFLOXACIN 500 MG: 500 TABLET, FILM COATED ORAL at 08:27

## 2023-08-29 ASSESSMENT — ACTIVITIES OF DAILY LIVING (ADL)
ADLS_ACUITY_SCORE: 36
ADLS_ACUITY_SCORE: 35
ADLS_ACUITY_SCORE: 40
ADLS_ACUITY_SCORE: 38
ADLS_ACUITY_SCORE: 40
ADLS_ACUITY_SCORE: 35
ADLS_ACUITY_SCORE: 40
ADLS_ACUITY_SCORE: 35
ADLS_ACUITY_SCORE: 40

## 2023-08-29 NOTE — PROGRESS NOTES
Care Management Follow Up    Length of Stay (days): 3    Expected Discharge Date: 08/30/2023     Concerns to be Addressed:  cardio following     Patient plan of care discussed at interdisciplinary rounds: Yes    Anticipated Discharge Disposition:  home with family assist, pt lives with son and son can help.     Anticipated Discharge Services:    Anticipated Discharge DME:      Patient/family educated on Medicare website which has current facility and service quality ratings:    Education Provided on the Discharge Plan:    Patient/Family in Agreement with the Plan:      Referrals Placed by CM/SW:    Private pay costs discussed: Not applicable    Additional Information:  Assessed. Pt lives in a house with her son. She is independent with ADLs and IADLs. Still works full time. Pt states she has home O2 with [employer], and CPAP that she uses for bedtime, but it has not been working for several months. Son to transport at discharge.      Therapy recommend home with assist from family, pt lives with her son and son can help assist.    RNCM to follow for medical progression, recommendations, and final discharge plan.      Kena Melton RN

## 2023-08-29 NOTE — PROGRESS NOTES
"Pt wore hospital CPAP of 6 with 2L bleed in for most of the night. Pt awake at 0344 check and stated her home CPAP machine was recalled and does not have a replacement. Stated she has not used her home machine for \"awhile\" when she had it.  "

## 2023-08-29 NOTE — PROGRESS NOTES
HEART CARE NOTE          Assessment/Recommendations     1. Severe Biventricular failure c/b severe ADHF  Assessment / Plan  Renal function stable to improved; Initiate oral diuretic regimen and continue to monitor renal function/repeat BMP, UOP and hemodynamics closely   GDMT as detailed below     Current Pharmacotherapy AHA Guideline-Directed Medical Therapy   Sacubitril- valsartan 24-26 mg BID Lisinopril 20 mg twice daily   Carvedilol 3.125  mg twice daily Carvedilol 25 mg twice daily   Spironolactone not started Spironolactone 25 mg once daily   Hydralazine NA Hydralazine 100 mg three times daily   Isosorbide dinitrate NA Isosorbide dinitrate 40 mg three times daily   SGLT2 inhibitor:Dapagliflozin/Empagliflozin - not started Dapagliflozin or Empagliflozin 10 mg daily      2. Valvular heart disease  Assessment / Plan  Moderate MR- routine imagine surveillance indicated     3. Acute hypoxic respiratory failure  Assessment / Plan  Likely 2/2 ADHF/cardiogenic pulmonary edema - diuresis as above     4. SAM  Assessment / Plan  Management per primary team - plan for outpatient sleep study    Plan of care discussed on August 29, 2023 with patient at bedside, and primary team overseeing patient's care    50 minutes spent reviewing prior records (including documentation, laboratory studies, cardiac testing/imaging), history and physical exam, planning, and subsequent documentation.      History of Present Illness/Subjective    Ms. Eva Parikh is a 52 year old female with a PMHx significant for (per Dr. Espino's note) COPD/asthma overlap syndrome, obstructive sleep apnea not using her CPAP with no previous history of cardiac disease found to be in new onset ADHF.     Today, Mrs. Parikh wihtout any acute cardiac events or complaints; Management plan as detailed above     ECG: personally reviewed; sinus tachycardia.     ECHO (personnaly Reviewed):   1. Left ventricular function is decreased. The ejection fraction is  "15-20%  (severely reduced). There is severe global hypokinesia of the left ventricle.  2. The right ventricle is normal size. Mildly decreased right ventricular  systolic function  3. The left atrium is moderately dilated.  4. There is moderate (2+) mitral regurgitation. The mitral regurgitant jet is  eccentrically directed.  5. High RA pressure estimated at 15 mmHg or greater.    Telemetry: personally reviewed August 29, 2023; notable for NSR     Lab results: personally reviewed August 29, 2023; notable for resolving AILYN    Medical history and pertinent documents reviewed in Care Everywhere please where applicable see details above        Physical Examination Review of Systems   /83 (BP Location: Right arm)   Pulse 93   Temp 98.2  F (36.8  C) (Oral)   Resp 21   Ht 1.626 m (5' 4\")   Wt 111.1 kg (245 lb)   SpO2 95%   BMI 42.05 kg/m    Body mass index is 42.05 kg/m .  Wt Readings from Last 3 Encounters:   08/29/23 111.1 kg (245 lb)   08/29/22 113.4 kg (250 lb)   03/05/21 100.7 kg (222 lb)     General Appearance:   no distress, normal body habitus   ENT/Mouth: membranes moist, no oral lesions or bleeding gums.      EYES:  no scleral icterus, normal conjunctivae   Neck: no carotid bruits or thyromegaly   Chest/Lungs:   lungs are clear to auscultation, no rales or wheezing, equal chest wall expansion    Cardiovascular:   Regular. Normal first and second heart sounds with no murmurs, rubs, or gallops; the carotid, radial and posterior tibial pulses are intact, no JVD and trace LE edema bilaterally    Abdomen:  no organomegaly, masses, bruits, or tenderness; bowel sounds are present   Extremities: no cyanosis or clubbing   Skin: no xanthelasma, warm.    Neurologic: NAD     Psychiatric: NAD     A complete 10 systems ROS was reviewed  And is negative except what is listed in the HPI.          Medical History  Surgical History Family History Social History   Past Medical History:   Diagnosis Date    Anxiety     " Arthritis     Asthma     COPD (chronic obstructive pulmonary disease) (H)     Depression     Past Surgical History:   Procedure Laterality Date    ADENOIDECTOMY      TONSILLECTOMY      no family history of premature coronary artery disease Social History     Socioeconomic History    Marital status: Single     Spouse name: Not on file    Number of children: 1    Years of education: HS    Highest education level: Not on file   Occupational History    Not on file   Tobacco Use    Smoking status: Former     Packs/day: 1.00     Types: Cigarettes     Quit date: 1/1/2020     Years since quitting: 3.6    Smokeless tobacco: Never    Tobacco comments:     Decreased from 2 ppd to 1 ppd since May 2014   Substance and Sexual Activity    Alcohol use: No    Drug use: No    Sexual activity: Not Currently   Other Topics Concern    Not on file   Social History Narrative    Lives on her own.     Social Determinants of Health     Financial Resource Strain: Not on file   Food Insecurity: Not on file   Transportation Needs: Not on file   Physical Activity: Not on file   Stress: Not on file   Social Connections: Not on file   Intimate Partner Violence: Not on file   Housing Stability: Not on file           Lab Results    Chemistry/lipid CBC Cardiac Enzymes/BNP/TSH/INR   Lab Results   Component Value Date    CHOL 144 02/28/2020    HDL 41 (L) 02/28/2020    TRIG 72 02/28/2020    BUN 17.8 08/28/2023     08/28/2023    CO2 30 (H) 08/28/2023    Lab Results   Component Value Date    WBC 7.7 08/27/2023    HGB 12.2 08/27/2023    HCT 39.6 08/27/2023    MCV 91 08/27/2023     08/29/2023    Lab Results   Component Value Date    TROPONINI 0.02 01/01/2020    BNP 16 01/01/2020    TSH 0.45 08/27/2023     Lab Results   Component Value Date    TROPONINI 0.02 01/01/2020          Weight:    Wt Readings from Last 3 Encounters:   08/29/23 111.1 kg (245 lb)   08/29/22 113.4 kg (250 lb)   03/05/21 100.7 kg (222 lb)       Allergies  Allergies    Allergen Reactions    Amoxicillin Unknown     As kid had mold test and told allergic     Mold [Molds & Smuts] Unknown    Mold/Mildew [Molds & Smuts] Unknown    Other Environmental Allergy Unknown     DUST    Penicillins Unknown     As kid had mold test and told allergic     Pollen [Pollen Extract] Unknown         Surgical History  Past Surgical History:   Procedure Laterality Date    ADENOIDECTOMY      TONSILLECTOMY         Social History  Tobacco:   History   Smoking Status    Former    Packs/day: 1.00    Types: Cigarettes, Cigarettes    Quit date: 1/1/2020   Smokeless Tobacco    Never     Comment: Decreased from 2 ppd to 1 ppd since May 2014    Alcohol:   Social History    Substance and Sexual Activity      Alcohol use: No   Illicit Drugs:   History   Drug Use No       Family History  Family History   Problem Relation Age of Onset    Diabetes Mother     Thyroid Disease Mother     Heart Disease Mother     Mental Illness Mother     Diabetes Maternal Grandmother     Heart Disease Maternal Grandmother     Bipolar Disorder Sister           Lauren Vázquez MD on 8/29/2023      cc: Reymundo Avila

## 2023-08-29 NOTE — PROGRESS NOTES
[facility]    Medicine Progress Note - Hospitalist Service    Date of Admission:  8/26/2023    Assessment & Plan   Eva Parikh is a 52 year old female with asthma overlap syndrome presented on 8/26/2023 for progressively worsening SOB for one month. Admitted for new onset CHF.    New onset HFrEF: Has significantly elevated BNP. Troponin was borderline high on admission and remains stable after admission. No prior history of CHF.  CTA chest shows cardiac enlargement and pulmonary hypertension.  Echocardiogram: severely reduced LVEF 15-20% with severe global hypokinesia of the left ventricle, mildly decreased right ventricular systolic function and moderate MR.   - Was on diuresis with Lasix 40 mg IV q12h. Switched to oral Bumex today  - Intake and output  - Daily weight  - Monitor creatinine and electrolytes  - Cardiology consulted and input appreciated: May need stress MRI    Acute hypoxemic respiratory failure: Oxygen saturation 86% on room air on admission. COVID tested negative. No pulmonary focal infiltrate.   - Started supplemental oxygen at 2 LPM. Wean as tolerated.  Encouraged incentive spirometry    Obstructive sleep apnea: Used to use CPAP. But it got broken a few years ago and has not used it since.   - Supplemental oxygen as above  - Outpatient sleep study to follow up    History of COPD asthma overlap syndrome: CTA chest shows emphysematous changes in the lungs. Currently not on exacerbation. Continue home inhalers.    Transaminitis: likely be related to underlying new onset CHF/congestion. Monitor.     UTI: developed blood tinged urine and dysuria on 8/28.UA indicates UTI. Start ciprofloxacin. Follow up urine culture result.     Anxiety and depression: Continue home meds    Obesity: BMI 42.86.        Diet: 2 Gram Sodium Diet    DVT Prophylaxis: Enoxaparin (Lovenox) SQ  Brown Catheter: Not present  Lines: None     Cardiac Monitoring: ACTIVE order. Indication: Acute  "decompensated heart failure (48 hours)  Code Status: Full Code      Clinically Significant Risk Factors                   # Acute heart failure with reduced ejection fraction: last echo with EF <40% and receiving IV diuretics       # Severe Obesity: Estimated body mass index is 42.05 kg/m  as calculated from the following:    Height as of this encounter: 1.626 m (5' 4\").    Weight as of this encounter: 111.1 kg (245 lb)., PRESENT ON ADMISSION            Disposition Plan      Expected Discharge Date: 08/30/2023      Destination: home            Bianca Gonzalez MD  Hospitalist Service  [facility]  Securely message with [employer] (more info)  Text page via [employer] Paging/Directory   ______________________________________________________________________    Interval History   Overall patient feels better however still hypoxic requiring 1 L LPM during daytime      Physical Exam   Vital Signs: Temp: 98.5  F (36.9  C) Temp src: Oral BP: 97/74 Pulse: 94   Resp: 22 SpO2: 96 % O2 Device: Nasal cannula Oxygen Delivery: 2 LPM  Weight: 245 lbs 0 oz    General Appearance: Obese female in no acute distress  Respiratory: Respirations unlabored.  Lungs are clear bilaterally  Cardiovascular: Regular rate and rhythm, normal S1 and S2  GI: Abdomen: Soft and nontender    Medical Decision Making       45 MINUTES SPENT BY ME on the date of service doing chart review, history, exam, documentation & further activities per the note.  MANAGEMENT DISCUSSED with the following over the past 24 hours: Patient, nursing staff       Data     I have personally reviewed the following data over the past 24 hrs:    N/A  \   N/A   / 174     138 99 17.9 /  87   3.7 30 (H) 0.99 (H) \       Imaging results reviewed over the past 24 hrs:   No results found for this or any previous visit (from the past 24 hour(s)).  "

## 2023-08-29 NOTE — PLAN OF CARE
Heart Failure Care Map  GOALS TO BE MET BEFORE DISCHARGE:    1. Decrease congestion and/or edema with diuretic therapy to achieve near optimal volume status.     Dyspnea improved: No, further care required to meet this goal. Please explain SOB with activity.   Edema improved: Yes, satisfactory for discharge.        Last 24 hour I/O:   Intake/Output Summary (Last 24 hours) at 8/29/2023 0736  Last data filed at 8/28/2023 2354  Gross per 24 hour   Intake 360 ml   Output 1500 ml   Net -1140 ml           Net I/O and Weights since admission:   07/30 1500 - 08/29 1459  In: 1295 [P.O.:1295]  Out: 5850 [Urine:5850]  Net: -4555     Vitals:    08/26/23 2132 08/27/23 0132 08/27/23 0529 08/28/23 0339   Weight: 113.4 kg (250 lb) 113.3 kg (249 lb 11.2 oz) 113.3 kg (249 lb 11.2 oz) 110.6 kg (243 lb 12.8 oz)    08/29/23 0451   Weight: 111.1 kg (245 lb)       2.  O2 sats > 90% on room air, or at prior home O2 therapy level.      Able to wean O2 this shift to keep sats above 90%?: No, further care required to meet this goal. Please explain 1L O2 NC when awake. Increased O2 needs while sleeping. 3L O2 via CPAP.   Does patient use Home O2? Yes          Current oxygenation status:   SpO2: 95 %     O2 Device: Nasal cannula, Oxygen Delivery: 2 LPM    3.  Tolerates ambulation and mobility near baseline.     Ambulation: No, further care required to meet this goal. Please explain  Ambulated to bathroom .   Times patient ambulated with staff this shift: 2    Please review the Heart Failure Care Map for additional HF goal outcomes.    Roxann Reardon RN  8/29/2023     Assumed care 1900 to 0730. A&O x 4. Stand by assist. Tele is sinus w/ BBB. Denies pain. Weaned to 1L O2 NC while awake. Patient wore CPAP when sleeping. Slept intermittently this shift. Up to toilet, one episode of incontinence. Call light within reach, able to make needs known. Bed alarm on for safety.

## 2023-08-30 ENCOUNTER — APPOINTMENT (OUTPATIENT)
Dept: OCCUPATIONAL THERAPY | Facility: HOSPITAL | Age: 53
DRG: 291 | End: 2023-08-30
Payer: COMMERCIAL

## 2023-08-30 LAB
ANION GAP SERPL CALCULATED.3IONS-SCNC: 7 MMOL/L (ref 7–15)
BUN SERPL-MCNC: 14.1 MG/DL (ref 6–20)
CALCIUM SERPL-MCNC: 8.5 MG/DL (ref 8.6–10)
CHLORIDE SERPL-SCNC: 100 MMOL/L (ref 98–107)
CREAT SERPL-MCNC: 0.89 MG/DL (ref 0.51–0.95)
CREAT SERPL-MCNC: 0.95 MG/DL (ref 0.51–0.95)
DEPRECATED HCO3 PLAS-SCNC: 34 MMOL/L (ref 22–29)
GFR SERPL CREATININE-BSD FRML MDRD: 71 ML/MIN/1.73M2
GFR SERPL CREATININE-BSD FRML MDRD: 77 ML/MIN/1.73M2
GLUCOSE SERPL-MCNC: 101 MG/DL (ref 70–99)
POTASSIUM SERPL-SCNC: 3.5 MMOL/L (ref 3.4–5.3)
SODIUM SERPL-SCNC: 141 MMOL/L (ref 136–145)

## 2023-08-30 PROCEDURE — 97110 THERAPEUTIC EXERCISES: CPT | Mod: GO

## 2023-08-30 PROCEDURE — 250N000013 HC RX MED GY IP 250 OP 250 PS 637: Performed by: INTERNAL MEDICINE

## 2023-08-30 PROCEDURE — 82565 ASSAY OF CREATININE: CPT | Performed by: INTERNAL MEDICINE

## 2023-08-30 PROCEDURE — 99233 SBSQ HOSP IP/OBS HIGH 50: CPT | Performed by: INTERNAL MEDICINE

## 2023-08-30 PROCEDURE — 80048 BASIC METABOLIC PNL TOTAL CA: CPT | Performed by: INTERNAL MEDICINE

## 2023-08-30 PROCEDURE — 210N000001 HC R&B IMCU HEART CARE

## 2023-08-30 PROCEDURE — 36415 COLL VENOUS BLD VENIPUNCTURE: CPT | Performed by: INTERNAL MEDICINE

## 2023-08-30 PROCEDURE — 97535 SELF CARE MNGMENT TRAINING: CPT | Mod: GO

## 2023-08-30 PROCEDURE — 250N000011 HC RX IP 250 OP 636: Mod: JZ | Performed by: INTERNAL MEDICINE

## 2023-08-30 RX ORDER — CEPHALEXIN 500 MG/1
500 CAPSULE ORAL EVERY 12 HOURS SCHEDULED
Status: DISCONTINUED | OUTPATIENT
Start: 2023-08-30 | End: 2023-09-01

## 2023-08-30 RX ADMIN — BENZTROPINE MESYLATE 0.5 MG: 0.5 TABLET ORAL at 20:19

## 2023-08-30 RX ADMIN — CEPHALEXIN 500 MG: 500 CAPSULE ORAL at 20:19

## 2023-08-30 RX ADMIN — DULOXETINE HYDROCHLORIDE 60 MG: 60 CAPSULE, DELAYED RELEASE PELLETS ORAL at 09:28

## 2023-08-30 RX ADMIN — SACUBITRIL AND VALSARTAN 1 TABLET: 24; 26 TABLET, FILM COATED ORAL at 20:19

## 2023-08-30 RX ADMIN — CEPHALEXIN 500 MG: 500 CAPSULE ORAL at 09:28

## 2023-08-30 RX ADMIN — SACUBITRIL AND VALSARTAN 1 TABLET: 24; 26 TABLET, FILM COATED ORAL at 09:28

## 2023-08-30 RX ADMIN — ENOXAPARIN SODIUM 40 MG: 40 INJECTION SUBCUTANEOUS at 06:54

## 2023-08-30 RX ADMIN — BUMETANIDE 2 MG: 2 TABLET ORAL at 09:28

## 2023-08-30 RX ADMIN — GABAPENTIN 100 MG: 100 CAPSULE ORAL at 15:33

## 2023-08-30 RX ADMIN — FLUTICASONE FUROATE AND VILANTEROL TRIFENATATE 1 PUFF: 200; 25 POWDER RESPIRATORY (INHALATION) at 09:27

## 2023-08-30 RX ADMIN — GABAPENTIN 100 MG: 100 CAPSULE ORAL at 09:28

## 2023-08-30 RX ADMIN — ENOXAPARIN SODIUM 40 MG: 40 INJECTION SUBCUTANEOUS at 20:19

## 2023-08-30 RX ADMIN — QUETIAPINE FUMARATE 150 MG: 100 TABLET ORAL at 20:19

## 2023-08-30 RX ADMIN — EMPAGLIFLOZIN 10 MG: 10 TABLET, FILM COATED ORAL at 09:28

## 2023-08-30 RX ADMIN — UMECLIDINIUM 1 PUFF: 62.5 AEROSOL, POWDER ORAL at 09:28

## 2023-08-30 RX ADMIN — CARVEDILOL 3.12 MG: 3.12 TABLET, FILM COATED ORAL at 09:28

## 2023-08-30 RX ADMIN — GABAPENTIN 400 MG: 100 CAPSULE ORAL at 20:19

## 2023-08-30 ASSESSMENT — ACTIVITIES OF DAILY LIVING (ADL)
ADLS_ACUITY_SCORE: 36
ADLS_ACUITY_SCORE: 34
ADLS_ACUITY_SCORE: 35
ADLS_ACUITY_SCORE: 34
ADLS_ACUITY_SCORE: 36
ADLS_ACUITY_SCORE: 36
ADLS_ACUITY_SCORE: 35
ADLS_ACUITY_SCORE: 36
ADLS_ACUITY_SCORE: 35

## 2023-08-30 NOTE — PROGRESS NOTES
[facility]    Medicine Progress Note - Hospitalist Service    Date of Admission:  8/26/2023    Assessment & Plan   Eva Parikh is a 52 year old female with COPD, asthma overlap syndrome, SAM not on CPAP recently, tobacco use, obesity, presented on 8/26/2023 for progressively worsening SOB for one month. Admitted for new onset CHF.    New onset HFrEF: Has significantly elevated BNP. Troponin was borderline high on admission and remains stable after admission. No prior history of CHF.  CTA chest shows cardiac enlargement and pulmonary hypertension.  Echocardiogram: severely reduced LVEF 15-20% with severe global hypokinesia of the left ventricle, mildly decreased right ventricular systolic function and moderate MR.   - Was on diuresis with Lasix 40 mg IV q12h. Switched to oral Bumex 8/29  -Cardiac stress MRI to rule out ischemic causes  -Continue to monitor intake and output, daily weights  - Monitor creatinine and electrolytes    Acute hypoxemic respiratory failure: Oxygen saturation 86% on room air on admission. COVID tested negative. No pulmonary focal infiltrate.  Still requires 2 LPM supplemental O2.  -Encourage ambulation every shift and incentive spirometry every 2 hours.  -Wean off supplemental O2 as tolerated  -Asked RN CM assistance with broken CPAP machine and home O2    Obstructive sleep apnea: Used to use CPAP. But it got broken a few years ago and has not used it since.   - Supplemental oxygen as above  - Outpatient sleep study to follow up    History of COPD asthma overlap syndrome: CTA chest shows emphysematous changes in the lungs. Currently not on exacerbation. Continue home inhalers.    Transaminitis: likely be related to underlying new onset CHF/congestion. Monitor.     Klebsiella UTI: developed blood tinged urine and dysuria on 8/28.culture sensitivities demonstrate resistance to ciprofloxacin, switched to Keflex x3 days    Anxiety and depression: Continue home  "meds    Obesity: BMI 42.86.        Diet: NPO for Medical/Clinical Reasons Except for: Meds  2 Gram Sodium Diet    DVT Prophylaxis: Enoxaparin (Lovenox) SQ  Brown Catheter: Not present  Lines: None     Cardiac Monitoring: ACTIVE order. Indication: Acute decompensated heart failure (48 hours)  Code Status: Full Code      Clinically Significant Risk Factors                   # Acute heart failure with reduced ejection fraction: last echo with EF <40% and receiving IV diuretics       # Severe Obesity: Estimated body mass index is 40.99 kg/m  as calculated from the following:    Height as of this encounter: 1.626 m (5' 4\").    Weight as of this encounter: 108.3 kg (238 lb 12.8 oz)., PRESENT ON ADMISSION            Disposition Plan      Expected Discharge Date: 08/31/2023      Destination: home  Discharge Comments: PO Bumex. ? DC 8/30          Bianca Gonzalez MD  Hospitalist Service  [facility]  Securely message with [employer] (more info)  Text page via [employer] Paging/[employer]   ______________________________________________________________________    Interval History   Patient reports persistent fatigue  Still requires 2 LPM  Some intermittent right-sided chest pain      Physical Exam   Vital Signs: Temp: 98.9  F (37.2  C) Temp src: Oral BP: 101/64 Pulse: 98   Resp: 18 SpO2: 93 % O2 Device: Nasal cannula Oxygen Delivery: 2 LPM  Weight: 238 lbs 12.8 oz    General: Obese female in no acute distress  CV: Regular rate and rhythm.  Normal S1-S2  Lungs: Clear  Extremities: No edema    Medical Decision Making       50 MINUTES SPENT BY ME on the date of service doing chart review, history, exam, documentation & further activities per the note.  MANAGEMENT DISCUSSED with the following over the past 24 hours: Patient, nursing staff, RN OLE, Dr. Vázquez       Data     I have personally reviewed the following data over the past 24 hrs:    N/A  \   N/A   / N/A     141 100 14.1 /  101 (H)   3.5 34 (H) 0.89 \    "    Imaging results reviewed over the past 24 hrs:   No results found for this or any previous visit (from the past 24 hour(s)).

## 2023-08-30 NOTE — PLAN OF CARE
Heart Failure Care Map  GOALS TO BE MET BEFORE DISCHARGE:    1. Decrease congestion and/or edema with diuretic therapy to achieve near optimal volume status.     Dyspnea improved: No, further care required to meet this goal. Please explain SOB with activity.   Edema improved: No, further care required to meet this goal. Please explain Generalized +1 edema.        Last 24 hour I/O:   Intake/Output Summary (Last 24 hours) at 8/30/2023 0615  Last data filed at 8/30/2023 0415  Gross per 24 hour   Intake 1777 ml   Output 2925 ml   Net -1148 ml           Net I/O and Weights since admission:   07/31 0700 - 08/30 0659  In: 3072 [P.O.:3072]  Out: 8775 [Urine:8775]  Net: -5703     Vitals:    08/26/23 2132 08/27/23 0132 08/27/23 0529 08/28/23 0339   Weight: 113.4 kg (250 lb) 113.3 kg (249 lb 11.2 oz) 113.3 kg (249 lb 11.2 oz) 110.6 kg (243 lb 12.8 oz)    08/29/23 0451 08/30/23 0344   Weight: 111.1 kg (245 lb) 108.3 kg (238 lb 12.8 oz)       2.  O2 sats > 90% on room air, or at prior home O2 therapy level.      Able to wean O2 this shift to keep sats above 90%?: No, further care required to meet this goal. Please explain 2L O2 via nasal cannula overnight. Pt did not wear CPAP.   Does patient use Home O2? Yes-  Pt will need home O2 evaluation.          Current oxygenation status:   SpO2: 94 %     O2 Device: Nasal cannula, Oxygen Delivery: 2 LPM    3.  Tolerates ambulation and mobility near baseline.     Ambulation: Yes, satisfactory for discharge.   Times patient ambulated with staff this shift: 2    Please review the Heart Failure Care Map for additional HF goal outcomes.    Roxann Reardon RN  8/30/2023       Assumed care 1900 to 0730. A&O x 4. Stand by assist. Tele is sinus w/ BBB & prolonged QTc. Denies pain. 2L O2 nasal cannula overnight. Patient did not wear CPAP. Up to toilet with episodes of incontinence. Son visited at bedside in the evening, supportive of patient. Call light within reach, able to make needs known. Bed  alarm on for safety.    Problem: Heart Failure  Goal: Effective Breathing Pattern During Sleep  Intervention: Monitor and Manage Obstructive Sleep Apnea  Recent Flowsheet Documentation  Taken 8/30/2023 0415 by ALEXANDER ROBBINS  Medication Review/Management: medications reviewed     Problem: Heart Failure  Goal: Effective Oxygenation and Ventilation  Intervention: Optimize Oxygenation and Ventilation  Recent Flowsheet Documentation  Taken 8/30/2023 0415 by ALEXANDER ROBBINS  Head of Bed (HOB) Positioning: HOB at 20-30 degrees

## 2023-08-30 NOTE — PROGRESS NOTES
Stress MRI planned for 8/31/23 at 1030am.  Please hold caffeine for 12 hrs prior to test (includes coffee, regular and decaf, sodas that contain caffeine, tea and chocolate).  NPO for 3 hours prior to test.  Hold put on MAR to hold bumex in am and coreg tonight AND tomorrow am (per protocol).  Please confirm IV is patent in the am. Plan discussed with floor nurse Cynthia. Cecilia Pierre RN

## 2023-08-30 NOTE — PROGRESS NOTES
HEART CARE NOTE          Assessment/Recommendations     1. Severe Biventricular failure c/b severe ADHF  Assessment / Plan  Tolerating oral diuretic regimen - no changes at this time  Continue to monitor renal function/repeat BMP, UOP and hemodynamics closely   GDMT as detailed below     Current Pharmacotherapy AHA Guideline-Directed Medical Therapy   Sacubitril- valsartan 24-26 mg BID Lisinopril 20 mg twice daily   Carvedilol 3.125  mg twice daily Carvedilol 25 mg twice daily   Spironolactone not started Spironolactone 25 mg once daily   Hydralazine NA Hydralazine 100 mg three times daily   Isosorbide dinitrate NA Isosorbide dinitrate 40 mg three times daily   SGLT2 inhibitor:Dapagliflozin/Empagliflozin - not started Dapagliflozin or Empagliflozin 10 mg daily      2. Valvular heart disease  Assessment / Plan  Moderate MR- routine imagine surveillance indicated     3. Acute hypoxic respiratory failure  Assessment / Plan  Likely 2/2 ADHF/cardiogenic pulmonary edema - diuresis as above     4. SAM  Assessment / Plan  Management per primary team - plan for outpatient sleep study    5. AILYN  Assessment / Plan  Resolving - continue to monitor UOP and renal function closely    Plan of care discussed on August 30, 2023 with patient at bedside, and primary team overseeing patient's care    50 minutes spent reviewing prior records (including documentation, laboratory studies, cardiac testing/imaging), history and physical exam, planning, and subsequent documentation.    Cardiology team will sign-off for now. Please do not hesitate to consult us again if new questions or concerns arise. Follow-up appointment will be arranged by [facility] clinic.       History of Present Illness/Subjective    Ms. Eva Parikh is a 52 year old female with a PMHx significant for (per Dr. Espino's note) COPD/asthma overlap syndrome, obstructive sleep apnea not using her CPAP with no previous history of cardiac disease found to be in new onset  "ADHF.     Today, Mrs. Parikh wihtout any acute cardiac events or complaints; Management plan as detailed above     ECG: personally reviewed; sinus tachycardia.     ECHO (personnaly Reviewed):   1. Left ventricular function is decreased. The ejection fraction is 15-20%  (severely reduced). There is severe global hypokinesia of the left ventricle.  2. The right ventricle is normal size. Mildly decreased right ventricular  systolic function  3. The left atrium is moderately dilated.  4. There is moderate (2+) mitral regurgitation. The mitral regurgitant jet is  eccentrically directed.  5. High RA pressure estimated at 15 mmHg or greater.    Telemetry: personally reviewed August 30, 2023; notable for NSR     Lab results: personally reviewed August 30, 2023; notable for resolving AILYN    Medical history and pertinent documents reviewed in Care Everywhere please where applicable see details above        Physical Examination Review of Systems   /65 (BP Location: Right arm)   Pulse 91   Temp 97.9  F (36.6  C) (Oral)   Resp 20   Ht 1.626 m (5' 4\")   Wt 108.3 kg (238 lb 12.8 oz)   SpO2 94%   BMI 40.99 kg/m    Body mass index is 40.99 kg/m .  Wt Readings from Last 3 Encounters:   08/30/23 108.3 kg (238 lb 12.8 oz)   08/29/22 113.4 kg (250 lb)   03/05/21 100.7 kg (222 lb)     General Appearance:   no distress, normal body habitus   ENT/Mouth: membranes moist, no oral lesions or bleeding gums.      EYES:  no scleral icterus, normal conjunctivae   Neck: no carotid bruits or thyromegaly   Chest/Lungs:   lungs are clear to auscultation, no rales or wheezing, equal chest wall expansion    Cardiovascular:   Regular. Normal first and second heart sounds with no murmurs, rubs, or gallops; the carotid, radial and posterior tibial pulses are intact, no JVD or LE edema bilaterally    Abdomen:  no organomegaly, masses, bruits, or tenderness; bowel sounds are present   Extremities: no cyanosis or clubbing   Skin: no " xanthelasma, warm.    Neurologic: NAD     Psychiatric: NAD     A complete 10 systems ROS was reviewed  And is negative except what is listed in the HPI.          Medical History  Surgical History Family History Social History   Past Medical History:   Diagnosis Date    Anxiety     Arthritis     Asthma     COPD (chronic obstructive pulmonary disease) (H)     Depression     Past Surgical History:   Procedure Laterality Date    ADENOIDECTOMY      TONSILLECTOMY      no family history of premature coronary artery disease Social History     Socioeconomic History    Marital status: Single     Spouse name: Not on file    Number of children: 1    Years of education:     Highest education level: Not on file   Occupational History    Not on file   Tobacco Use    Smoking status: Former     Packs/day: 1.00     Types: Cigarettes     Quit date: 1/1/2020     Years since quitting: 3.6    Smokeless tobacco: Never    Tobacco comments:     Decreased from 2 ppd to 1 ppd since May 2014   Substance and Sexual Activity    Alcohol use: No    Drug use: No    Sexual activity: Not Currently   Other Topics Concern    Not on file   Social History Narrative    Lives on her own.     Social Determinants of Health     Financial Resource Strain: Not on file   Food Insecurity: Not on file   Transportation Needs: Not on file   Physical Activity: Not on file   Stress: Not on file   Social Connections: Not on file   Intimate Partner Violence: Not on file   Housing Stability: Not on file           Lab Results    Chemistry/lipid CBC Cardiac Enzymes/BNP/TSH/INR   Lab Results   Component Value Date    CHOL 144 02/28/2020    HDL 41 (L) 02/28/2020    TRIG 72 02/28/2020    BUN 17.9 08/29/2023     08/29/2023    CO2 30 (H) 08/29/2023    Lab Results   Component Value Date    WBC 7.7 08/27/2023    HGB 12.2 08/27/2023    HCT 39.6 08/27/2023    MCV 91 08/27/2023     08/29/2023    Lab Results   Component Value Date    TROPONINI 0.02 01/01/2020    BNP 16  01/01/2020    TSH 0.45 08/27/2023     Lab Results   Component Value Date    TROPONINI 0.02 01/01/2020          Weight:    Wt Readings from Last 3 Encounters:   08/30/23 108.3 kg (238 lb 12.8 oz)   08/29/22 113.4 kg (250 lb)   03/05/21 100.7 kg (222 lb)       Allergies  Allergies   Allergen Reactions    Amoxicillin Unknown     As kid had mold test and told allergic     Mold [Molds & Smuts] Unknown    Mold/Mildew [Molds & Smuts] Unknown    Other Environmental Allergy Unknown     DUST    Penicillins Unknown     As kid had mold test and told allergic     Pollen [Pollen Extract] Unknown         Surgical History  Past Surgical History:   Procedure Laterality Date    ADENOIDECTOMY      TONSILLECTOMY         Social History  Tobacco:   History   Smoking Status    Former    Packs/day: 1.00    Types: Cigarettes, Cigarettes    Quit date: 1/1/2020   Smokeless Tobacco    Never     Comment: Decreased from 2 ppd to 1 ppd since May 2014    Alcohol:   Social History    Substance and Sexual Activity      Alcohol use: No   Illicit Drugs:   History   Drug Use No       Family History  Family History   Problem Relation Age of Onset    Diabetes Mother     Thyroid Disease Mother     Heart Disease Mother     Mental Illness Mother     Diabetes Maternal Grandmother     Heart Disease Maternal Grandmother     Bipolar Disorder Sister           Lauren Vázquez MD on 8/30/2023      cc: Reymundo Avila

## 2023-08-30 NOTE — PLAN OF CARE
Goal Outcome Evaluation:             Heart Failure Care Map  GOALS TO BE MET BEFORE DISCHARGE:    1. Decrease congestion and/or edema with diuretic therapy to achieve near optimal volume status.     Dyspnea improved: Yes, satisfactory for discharge.   Edema improved: No, further care required to meet this goal. Please explain 1+ edema lower extremities        Last 24 hour I/O:   Intake/Output Summary (Last 24 hours) at 8/30/2023 1804  Last data filed at 8/30/2023 1516  Gross per 24 hour   Intake 720 ml   Output 3350 ml   Net -2630 ml           Net I/O and Weights since admission:   07/31 2300 - 08/30 2259  In: 3072 [P.O.:3072]  Out: 95028 [Urine:94609]  Net: -8403     Vitals:    08/26/23 2132 08/27/23 0132 08/27/23 0529 08/28/23 0339   Weight: 113.4 kg (250 lb) 113.3 kg (249 lb 11.2 oz) 113.3 kg (249 lb 11.2 oz) 110.6 kg (243 lb 12.8 oz)    08/29/23 0451 08/30/23 0344   Weight: 111.1 kg (245 lb) 108.3 kg (238 lb 12.8 oz)       2.  O2 sats > 90% on room air, or at prior home O2 therapy level.      Able to wean O2 this shift to keep sats above 90%?: No, further care required to meet this goal. Please explain 2l/nc   Does patient use Home O2? No            Current oxygenation status:   SpO2: 93 %     O2 Device: Nasal cannula, Oxygen Delivery: 2 LPM    3.  Tolerates ambulation and mobility near baseline.     Ambulation: Yes, satisfactory for discharge.   Times patient ambulated with staff this shift: 1    Please review the Heart Failure Care Map for additional HF goal outcomes.    Judit Rodriguez RN  8/30/2023

## 2023-08-30 NOTE — PROGRESS NOTES
Care Management Follow Up    Length of Stay (days): 4    Expected Discharge Date: 08/31/2023     Concerns to be Addressed:  medical progression     Patient plan of care discussed at interdisciplinary rounds: Yes    Anticipated Discharge Disposition:       Anticipated Discharge Services:    Anticipated Discharge DME:      Patient/family educated on Medicare website which has current facility and service quality ratings:    Education Provided on the Discharge Plan:    Patient/Family in Agreement with the Plan:      Referrals Placed by CM/SW:    Private pay costs discussed: Not applicable    Additional Information:  Assessed. Pt lives in a house with her son. She is independent with ADLs and IADLs. Still works full time. Pt states she has home O2 with [employer], and CPAP that she uses for bedtime, but it has not been working for several months. Son to transport at discharge.       Therapy recommend home with assist from family, pt lives with her son and son can help assist.    CM was asked by nursing to assist pt with her broken CPAP machine. CM spoke to pt and she told CM that she was in the process of getting this fixed, CM offered to call the medical equipment company that she uses and see if I could get any additional information for the pt. The pt was ok with this and told CM that she thinks she uses [employer] [facility]. CM then reached out to [employer] [facility] [employer] and they do not see records that they supply O2 or CPAP equipment to this pt. OLE then called and LVM with pt son on (8/29) to see if he could look on the Oxygen and CPAP equipment to see who the supplier is and have not heard back from the son. OLE then spoke to the pt to see if it could be any other medical equipment supplier and she was unsure and that all the information was at home. She also told CM that she will fix the problem on her own and did not need my help.    RNCM to follow for medical progression, recommendations, and final  discharge plan.      Kena Melton RN

## 2023-08-30 NOTE — PLAN OF CARE
Problem: Heart Failure  Goal: Optimal Cardiac Output  Outcome: Progressing  Goal: Improved Oral Intake  Outcome: Progressing  Goal: Effective Oxygenation and Ventilation  Outcome: Progressing  Intervention: Promote Airway Secretion Clearance  Recent Flowsheet Documentation  Taken 8/30/2023 0800 by Cynthia Alatorre RN  Cough And Deep Breathing: done independently per patient   Goal Outcome Evaluation:     Cardiology ordered MR Cardiac w/Contrast Stress Flow testing. Patient made aware of no caffeine x 12 hours and NPO 3 hours prior. She was very upset that she was unable to order soda with her lunch, diet was modified so that she could have soda until 1900 tonight.   Patient was asleep until 0930 this AM and during sleep her NC fell off her face and oxygen saturation dropped to 88%.  Per note patients home CPAP has been not working for several months so spoke with S/W regarding ordering new one. S/W said that she spoke with patient regarding this and patient stated that she would take care of this on her own.

## 2023-08-31 ENCOUNTER — APPOINTMENT (OUTPATIENT)
Dept: MRI IMAGING | Facility: HOSPITAL | Age: 53
DRG: 291 | End: 2023-08-31
Attending: INTERNAL MEDICINE
Payer: COMMERCIAL

## 2023-08-31 ENCOUNTER — APPOINTMENT (OUTPATIENT)
Dept: OCCUPATIONAL THERAPY | Facility: HOSPITAL | Age: 53
DRG: 291 | End: 2023-08-31
Payer: COMMERCIAL

## 2023-08-31 LAB
ANION GAP SERPL CALCULATED.3IONS-SCNC: 6 MMOL/L (ref 7–15)
ATRIAL RATE - MUSE: 97 BPM
BUN SERPL-MCNC: 15.8 MG/DL (ref 6–20)
CALCIUM SERPL-MCNC: 8.9 MG/DL (ref 8.6–10)
CHLORIDE SERPL-SCNC: 99 MMOL/L (ref 98–107)
CREAT SERPL-MCNC: 1.09 MG/DL (ref 0.51–0.95)
DEPRECATED HCO3 PLAS-SCNC: 35 MMOL/L (ref 22–29)
DIASTOLIC BLOOD PRESSURE - MUSE: NORMAL MMHG
GFR SERPL CREATININE-BSD FRML MDRD: 60 ML/MIN/1.73M2
GLUCOSE SERPL-MCNC: 114 MG/DL (ref 70–99)
INTERPRETATION ECG - MUSE: NORMAL
P AXIS - MUSE: 58 DEGREES
POTASSIUM SERPL-SCNC: 3.8 MMOL/L (ref 3.4–5.3)
PR INTERVAL - MUSE: 146 MS
QRS DURATION - MUSE: 150 MS
QT - MUSE: 454 MS
QTC - MUSE: 576 MS
R AXIS - MUSE: -29 DEGREES
SODIUM SERPL-SCNC: 140 MMOL/L (ref 136–145)
SYSTOLIC BLOOD PRESSURE - MUSE: NORMAL MMHG
T AXIS - MUSE: 67 DEGREES
VENTRICULAR RATE- MUSE: 97 BPM

## 2023-08-31 PROCEDURE — 999N000122 MR MYOCARDIUM  OVERREAD

## 2023-08-31 PROCEDURE — A9585 GADOBUTROL INJECTION: HCPCS | Mod: JZ | Performed by: INTERNAL MEDICINE

## 2023-08-31 PROCEDURE — 97110 THERAPEUTIC EXERCISES: CPT | Mod: GO

## 2023-08-31 PROCEDURE — 250N000013 HC RX MED GY IP 250 OP 250 PS 637: Performed by: INTERNAL MEDICINE

## 2023-08-31 PROCEDURE — 93005 ELECTROCARDIOGRAM TRACING: CPT

## 2023-08-31 PROCEDURE — 99233 SBSQ HOSP IP/OBS HIGH 50: CPT | Performed by: INTERNAL MEDICINE

## 2023-08-31 PROCEDURE — 250N000011 HC RX IP 250 OP 636: Performed by: INTERNAL MEDICINE

## 2023-08-31 PROCEDURE — 210N000001 HC R&B IMCU HEART CARE

## 2023-08-31 PROCEDURE — 84132 ASSAY OF SERUM POTASSIUM: CPT | Performed by: INTERNAL MEDICINE

## 2023-08-31 PROCEDURE — 75561 CARDIAC MRI FOR MORPH W/DYE: CPT | Mod: 26 | Performed by: INTERNAL MEDICINE

## 2023-08-31 PROCEDURE — 97535 SELF CARE MNGMENT TRAINING: CPT | Mod: GO

## 2023-08-31 PROCEDURE — 999N000157 HC STATISTIC RCP TIME EA 10 MIN

## 2023-08-31 PROCEDURE — 93010 ELECTROCARDIOGRAM REPORT: CPT | Mod: 77 | Performed by: INTERNAL MEDICINE

## 2023-08-31 PROCEDURE — 93018 CV STRESS TEST I&R ONLY: CPT | Performed by: INTERNAL MEDICINE

## 2023-08-31 PROCEDURE — 36415 COLL VENOUS BLD VENIPUNCTURE: CPT | Performed by: INTERNAL MEDICINE

## 2023-08-31 PROCEDURE — 94660 CPAP INITIATION&MGMT: CPT

## 2023-08-31 PROCEDURE — 93017 CV STRESS TEST TRACING ONLY: CPT | Performed by: INTERNAL MEDICINE

## 2023-08-31 PROCEDURE — 93010 ELECTROCARDIOGRAM REPORT: CPT | Performed by: INTERNAL MEDICINE

## 2023-08-31 PROCEDURE — 93005 ELECTROCARDIOGRAM TRACING: CPT | Performed by: INTERNAL MEDICINE

## 2023-08-31 PROCEDURE — 250N000011 HC RX IP 250 OP 636: Mod: JZ | Performed by: INTERNAL MEDICINE

## 2023-08-31 PROCEDURE — 75561 CARDIAC MRI FOR MORPH W/DYE: CPT

## 2023-08-31 PROCEDURE — 255N000002 HC RX 255 OP 636: Mod: JZ | Performed by: INTERNAL MEDICINE

## 2023-08-31 RX ORDER — BUMETANIDE 1 MG/1
1 TABLET ORAL DAILY
Status: DISCONTINUED | OUTPATIENT
Start: 2023-09-01 | End: 2023-09-01 | Stop reason: HOSPADM

## 2023-08-31 RX ORDER — REGADENOSON 0.08 MG/ML
0.4 INJECTION, SOLUTION INTRAVENOUS ONCE
Status: COMPLETED | OUTPATIENT
Start: 2023-08-31 | End: 2023-08-31

## 2023-08-31 RX ORDER — GADOBUTROL 604.72 MG/ML
20 INJECTION INTRAVENOUS ONCE
Status: COMPLETED | OUTPATIENT
Start: 2023-08-31 | End: 2023-08-31

## 2023-08-31 RX ORDER — AMINOPHYLLINE 25 MG/ML
50 INJECTION, SOLUTION INTRAVENOUS
Status: ACTIVE | OUTPATIENT
Start: 2023-08-31 | End: 2023-08-31

## 2023-08-31 RX ADMIN — ENOXAPARIN SODIUM 40 MG: 40 INJECTION SUBCUTANEOUS at 07:59

## 2023-08-31 RX ADMIN — SACUBITRIL AND VALSARTAN 1 TABLET: 24; 26 TABLET, FILM COATED ORAL at 08:00

## 2023-08-31 RX ADMIN — GABAPENTIN 400 MG: 100 CAPSULE ORAL at 20:50

## 2023-08-31 RX ADMIN — DULOXETINE HYDROCHLORIDE 60 MG: 60 CAPSULE, DELAYED RELEASE PELLETS ORAL at 08:00

## 2023-08-31 RX ADMIN — FLUTICASONE FUROATE AND VILANTEROL TRIFENATATE 1 PUFF: 200; 25 POWDER RESPIRATORY (INHALATION) at 08:00

## 2023-08-31 RX ADMIN — CEPHALEXIN 500 MG: 500 CAPSULE ORAL at 20:51

## 2023-08-31 RX ADMIN — BENZTROPINE MESYLATE 0.5 MG: 0.5 TABLET ORAL at 20:51

## 2023-08-31 RX ADMIN — QUETIAPINE FUMARATE 150 MG: 100 TABLET ORAL at 20:51

## 2023-08-31 RX ADMIN — ENOXAPARIN SODIUM 40 MG: 40 INJECTION SUBCUTANEOUS at 18:33

## 2023-08-31 RX ADMIN — REGADENOSON 0.4 MG: 0.08 INJECTION, SOLUTION INTRAVENOUS at 12:06

## 2023-08-31 RX ADMIN — GADOBUTROL 20 ML: 604.72 INJECTION INTRAVENOUS at 13:26

## 2023-08-31 RX ADMIN — CEPHALEXIN 500 MG: 500 CAPSULE ORAL at 07:59

## 2023-08-31 RX ADMIN — UMECLIDINIUM 1 PUFF: 62.5 AEROSOL, POWDER ORAL at 08:00

## 2023-08-31 RX ADMIN — GABAPENTIN 100 MG: 100 CAPSULE ORAL at 16:42

## 2023-08-31 RX ADMIN — GABAPENTIN 100 MG: 100 CAPSULE ORAL at 08:00

## 2023-08-31 RX ADMIN — AMINOPHYLLINE 50 MG: 25 INJECTION, SOLUTION INTRAVENOUS at 12:21

## 2023-08-31 ASSESSMENT — ACTIVITIES OF DAILY LIVING (ADL)
ADLS_ACUITY_SCORE: 34
ADLS_ACUITY_SCORE: 35
ADLS_ACUITY_SCORE: 34
ADLS_ACUITY_SCORE: 35
ADLS_ACUITY_SCORE: 34
ADLS_ACUITY_SCORE: 35

## 2023-08-31 NOTE — PROGRESS NOTES
HEART CARE NOTE          Assessment/Recommendations     1. Severe Biventricular failure c/b severe ADHF  Assessment / Plan  Tolerating oral diuretic regimen - no changes at this time  Will get stress MRI today to r/o CAD/ischemia  Continue to monitor renal function/repeat BMP, UOP and hemodynamics closely   GDMT as detailed below     Current Pharmacotherapy AHA Guideline-Directed Medical Therapy   Sacubitril- valsartan 24-26 mg BID Lisinopril 20 mg twice daily   Carvedilol 3.125  mg twice daily Carvedilol 25 mg twice daily   Spironolactone not started Spironolactone 25 mg once daily   Hydralazine NA Hydralazine 100 mg three times daily   Isosorbide dinitrate NA Isosorbide dinitrate 40 mg three times daily   SGLT2 inhibitor:Dapagliflozin/Empagliflozin - not started Dapagliflozin or Empagliflozin 10 mg daily      2. Valvular heart disease  Assessment / Plan  Moderate MR- routine imagine surveillance indicated     3. Acute hypoxic respiratory failure  Assessment / Plan  Likely 2/2 ADHF/cardiogenic pulmonary edema - diuresis as above     4. SAM  Assessment / Plan  Management per primary team - plan for outpatient sleep study     5. AILYN  Assessment / Plan  Resolving - continue to monitor UOP and renal function closely    Plan of care discussed on August 31, 2023 with patient at bedside, and primary team overseeing patient's care    50 minutes spent reviewing prior records (including documentation, laboratory studies, cardiac testing/imaging), history and physical exam, planning, and subsequent documentation.    History of Present Illness/Subjective    Ms. Eva Parikh is a 52 year old female with a PMHx significant for (per Dr. Espino's note) COPD/asthma overlap syndrome, obstructive sleep apnea not using her CPAP with no previous history of cardiac disease found to be in new onset ADHF.     Today, Mrs. Parikh wihtout any acute cardiac events or complaints; Management plan as detailed above     ECG: personally  "reviewed; sinus tachycardia.     ECHO (personnaly Reviewed):   1. Left ventricular function is decreased. The ejection fraction is 15-20%  (severely reduced). There is severe global hypokinesia of the left ventricle.  2. The right ventricle is normal size. Mildly decreased right ventricular  systolic function  3. The left atrium is moderately dilated.  4. There is moderate (2+) mitral regurgitation. The mitral regurgitant jet is  eccentrically directed.  5. High RA pressure estimated at 15 mmHg or greater.    Telemetry: personally reviewed August 31, 2023; notable for NSR     Lab results: personally reviewed August 31, 2023; notable for evolving AILYN    Medical history and pertinent documents reviewed in Care Everywhere please where applicable see details above        Physical Examination Review of Systems   BP 94/58 (BP Location: Right arm)   Pulse 95   Temp 98  F (36.7  C) (Oral)   Resp 18   Ht 1.626 m (5' 4\")   Wt 107.6 kg (237 lb 3.4 oz)   SpO2 90%   BMI 40.72 kg/m    Body mass index is 40.72 kg/m .  Wt Readings from Last 3 Encounters:   08/31/23 107.6 kg (237 lb 3.4 oz)   08/29/22 113.4 kg (250 lb)   03/05/21 100.7 kg (222 lb)     General Appearance:   no distress, normal body habitus   ENT/Mouth: membranes moist, no oral lesions or bleeding gums.      EYES:  no scleral icterus, normal conjunctivae   Neck: no carotid bruits or thyromegaly   Chest/Lungs:   lungs are clear to auscultation, no rales or wheezing, equal chest wall expansion    Cardiovascular:   Regular. Normal first and second heart sounds with no murmurs, rubs, or gallops; the carotid, radial and posterior tibial pulses are intact, no JVD or LE edema bilaterally    Abdomen:  no organomegaly, masses, bruits, or tenderness; bowel sounds are present   Extremities: no cyanosis or clubbing   Skin: no xanthelasma, warm.    Neurologic: NAD     Psychiatric: NAD     A complete 10 systems ROS was reviewed  And is negative except what is listed in the " HPI.          Medical History  Surgical History Family History Social History   Past Medical History:   Diagnosis Date    Anxiety     Arthritis     Asthma     COPD (chronic obstructive pulmonary disease) (H)     Depression     Past Surgical History:   Procedure Laterality Date    ADENOIDECTOMY      TONSILLECTOMY      no family history of premature coronary artery disease Social History     Socioeconomic History    Marital status: Single     Spouse name: Not on file    Number of children: 1    Years of education: HS    Highest education level: Not on file   Occupational History    Not on file   Tobacco Use    Smoking status: Former     Packs/day: 1.00     Types: Cigarettes     Quit date: 1/1/2020     Years since quitting: 3.6    Smokeless tobacco: Never    Tobacco comments:     Decreased from 2 ppd to 1 ppd since May 2014   Substance and Sexual Activity    Alcohol use: No    Drug use: No    Sexual activity: Not Currently   Other Topics Concern    Not on file   Social History Narrative    Lives on her own.     Social Determinants of Health     Financial Resource Strain: Not on file   Food Insecurity: Not on file   Transportation Needs: Not on file   Physical Activity: Not on file   Stress: Not on file   Social Connections: Not on file   Intimate Partner Violence: Not on file   Housing Stability: Not on file           Lab Results    Chemistry/lipid CBC Cardiac Enzymes/BNP/TSH/INR   Lab Results   Component Value Date    CHOL 144 02/28/2020    HDL 41 (L) 02/28/2020    TRIG 72 02/28/2020    BUN 15.8 08/31/2023     08/31/2023    CO2 35 (H) 08/31/2023    Lab Results   Component Value Date    WBC 7.7 08/27/2023    HGB 12.2 08/27/2023    HCT 39.6 08/27/2023    MCV 91 08/27/2023     08/29/2023    Lab Results   Component Value Date    TROPONINI 0.02 01/01/2020    BNP 16 01/01/2020    TSH 0.45 08/27/2023     Lab Results   Component Value Date    TROPONINI 0.02 01/01/2020          Weight:    Wt Readings from Last 3  Encounters:   08/31/23 107.6 kg (237 lb 3.4 oz)   08/29/22 113.4 kg (250 lb)   03/05/21 100.7 kg (222 lb)       Allergies  Allergies   Allergen Reactions    Amoxicillin Unknown     As kid had mold test and told allergic     Mold [Molds & Smuts] Unknown    Mold/Mildew [Molds & Smuts] Unknown    Other Environmental Allergy Unknown     DUST    Penicillins Unknown     As kid had mold test and told allergic     Pollen [Pollen Extract] Unknown         Surgical History  Past Surgical History:   Procedure Laterality Date    ADENOIDECTOMY      TONSILLECTOMY         Social History  Tobacco:   History   Smoking Status    Former    Packs/day: 1.00    Types: Cigarettes, Cigarettes    Quit date: 1/1/2020   Smokeless Tobacco    Never     Comment: Decreased from 2 ppd to 1 ppd since May 2014    Alcohol:   Social History    Substance and Sexual Activity      Alcohol use: No   Illicit Drugs:   History   Drug Use No       Family History  Family History   Problem Relation Age of Onset    Diabetes Mother     Thyroid Disease Mother     Heart Disease Mother     Mental Illness Mother     Diabetes Maternal Grandmother     Heart Disease Maternal Grandmother     Bipolar Disorder Sister           Lauren Vázquez MD on 8/31/2023      cc: Reymundo Avila

## 2023-08-31 NOTE — PLAN OF CARE
Heart Failure Care Map  GOALS TO BE MET BEFORE DISCHARGE:    1. Decrease congestion and/or edema with diuretic therapy to achieve near optimal volume status.     Dyspnea improved: No, further care required to meet this goal. Please explain SOB with activity.   Edema improved: Yes, satisfactory for discharge.        Last 24 hour I/O:   Intake/Output Summary (Last 24 hours) at 8/31/2023 0530  Last data filed at 8/31/2023 0505  Gross per 24 hour   Intake 1125 ml   Output 3700 ml   Net -2575 ml           Net I/O and Weights since admission:   08/01 0700 - 08/31 0659  In: 4197 [P.O.:4197]  Out: 20050 [Urine:04111]  Net: -8278     Vitals:    08/26/23 2132 08/27/23 0132 08/27/23 0529 08/28/23 0339   Weight: 113.4 kg (250 lb) 113.3 kg (249 lb 11.2 oz) 113.3 kg (249 lb 11.2 oz) 110.6 kg (243 lb 12.8 oz)    08/29/23 0451 08/30/23 0344   Weight: 111.1 kg (245 lb) 108.3 kg (238 lb 12.8 oz)       2.  O2 sats > 90% on room air, or at prior home O2 therapy level.      Able to wean O2 this shift to keep sats above 90%?: No, further care required to meet this goal. Please explain 1L O2 via nasal cannula.   Does patient use Home O2? Yes          Current oxygenation status:   SpO2: 92 %     O2 Device: Nasal cannula, Oxygen Delivery: 2 LPM    3.  Tolerates ambulation and mobility near baseline.     Ambulation: Yes, satisfactory for discharge.   Times patient ambulated with staff this shift: 2    Please review the Heart Failure Care Map for additional HF goal outcomes.    Roxann Reardon RN  8/31/2023     Assumed care 1900 to 0730. A&O x 4. Stand by assist. Tele is sinus w/ BBB. Denies pain. Up to toilet. NPO @ 5840. Son at bedside in the evening, supportive of patient. Call light within reach, able to make needs known. Bed alarm on for safety.

## 2023-08-31 NOTE — PROGRESS NOTES
Patient on 2LPM nasal cannula. Declined to wear cpap. Hospital cpap in room if needed. RT will continue to monitor.

## 2023-08-31 NOTE — PLAN OF CARE
Problem: Heart Failure  Goal: Optimal Coping  Outcome: Progressing     Problem: Heart Failure  Goal: Optimal Cardiac Output  Outcome: Progressing     Problem: Heart Failure  Goal: Optimal Functional Ability  Outcome: Progressing  Intervention: Optimize Functional Ability  Recent Flowsheet Documentation  Taken 8/31/2023 1600 by Day, Gabi GAMA RN  Activity Management: activity adjusted per tolerance     Problem: Heart Failure  Goal: Optimal Functional Ability  Outcome: Progressing  Intervention: Optimize Functional Ability  Recent Flowsheet Documentation  Taken 8/31/2023 1600 by Day, Gabi GAMA RN  Activity Management: activity adjusted per tolerance     Problem: Heart Failure  Goal: Improved Oral Intake  Outcome: Progressing     Problem: Heart Failure  Goal: Effective Breathing Pattern During Sleep  Outcome: Progressing     Problem: Heart Failure  Goal: Effective Oxygenation and Ventilation  Intervention: Promote Airway Secretion Clearance  Recent Flowsheet Documentation  Taken 8/31/2023 1600 by Day, Gabi GMAA RN  Activity Management: activity adjusted per tolerance   Goal Outcome Evaluation:       PT denies pain. Tele SR BBB. Sats on room air 88-reapplied oxygen to 2 L.  Held Coreg due to /72

## 2023-08-31 NOTE — PROGRESS NOTES
Care Management Follow Up    Length of Stay (days): 5    Expected Discharge Date: 09/01/2023     Concerns to be Addressed:  Stress MRI today     Patient plan of care discussed at interdisciplinary rounds: Yes    Anticipated Discharge Disposition:  home     Anticipated Discharge Services:    Anticipated Discharge DME:      Patient/family educated on Medicare website which has current facility and service quality ratings:    Education Provided on the Discharge Plan:    Patient/Family in Agreement with the Plan:      Referrals Placed by CM/SW:  none at this time  Private pay costs discussed: Not applicable    Additional Information:  Assessed. Pt lives in a house with her son. She is independent with ADLs and IADLs. Still works full time. Pt states she has home O2 with [employer], and CPAP that she uses for bedtime, but it has not been working for several months. Son to transport at discharge.       Therapy recommend home with assist from family, pt lives with her son and son can help assist.    Plan per Cardio note: will get stress MRI today to r/o CAD/ischemia.    Kena Melton RN

## 2023-08-31 NOTE — PROGRESS NOTES
[facility]    Medicine Progress Note - Hospitalist Service    Date of Admission:  8/26/2023    Assessment & Plan   52 year old female with history of COPD/asthma overlap syndrome, SAM not on CPAP recently, tobacco use and obesity, presented on 8/26/2023 with new onset CHF.     New onset HFrEF: No prior history of CHF.  Echocardiogram showed severely reduced LVEF 15-20% with severe global hypokinesia of the left ventricle, mildly decreased right ventricular systolic function and moderate MR.   -- diuresis per cardiology  -- continue coreg, jardiance, entresto  -- Cardiac stress MRI to rule out ischemic causes        Acute hypoxemic respiratory failure: Oxygen saturation 86% on room air on admission. COVID tested negative. No pulmonary focal infiltrate.  Still requires supplemental O2.  -- Wean off supplemental O2 as tolerated  -- RN CM assistance with broken CPAP machine and home O2       Obstructive sleep apnea: Used to use CPAP until broken a few years ago and has not used it since.   -- Supplemental oxygen as above  -- Outpatient sleep study to follow up       History of COPD asthma overlap syndrome: CTA chest shows emphysematous changes in the lungs. Currently not on exacerbation.   --Continue home inhalers.       Transaminitis: likely be related to underlying new onset CHF/congestion  -- trend as outpatient       Klebsiella UTI: developed blood tinged urine and dysuria on 8/28.  Culture sensitivities demonstrate resistance to ciprofloxacin  --switched to Keflex x3 days       Anxiety and depression: Continue home cogentin, cymbalta, seroquel       Diet: NPO for Medical/Clinical Reasons Except for: Meds    DVT Prophylaxis: Enoxaparin (Lovenox) SQ  Brown Catheter: Not present  Lines: None     Cardiac Monitoring: ACTIVE order. Indication: Acute decompensated heart failure (48 hours)  Code Status: Full Code      Clinically Significant Risk Factors                   # Chronic heart failure  "with reduced ejection fraction: last echo with EF <40%       # Severe Obesity: Estimated body mass index is 40.72 kg/m  as calculated from the following:    Height as of this encounter: 1.626 m (5' 4\").    Weight as of this encounter: 107.6 kg (237 lb 3.4 oz).             Disposition Plan      Expected Discharge Date: 09/01/2023      Destination: home  Discharge Comments: Needs cardiac MRI, final cards recs          Karri Hogan, DO  Hospitalist Service  [facility]  Securely message with [employer] (more info)  Text page via [facility] Paging/Directory   ______________________________________________________________________    Interval History   NAD. Denies any complaints    Physical Exam   Vital Signs: Temp: 98  F (36.7  C) Temp src: Oral BP: 94/58 Pulse: 95   Resp: 18 SpO2: 90 % O2 Device: Nasal cannula Oxygen Delivery: 2 LPM  Weight: 237 lbs 3.44 oz  General: NAD  RESPIRATORY: Breathing nonlabored  CARDIOVASCULAR: 1+ le edema bilat.   NEUROLOGIC: Motor intact, speech clear         Medical Decision Making       >50 MINUTES SPENT BY ME on the date of service doing chart review, history, exam, documentation & further activities per the note.      Data       "

## 2023-08-31 NOTE — PLAN OF CARE
Problem: Heart Failure  Goal: Optimal Functional Ability  Outcome: Progressing  Goal: Effective Oxygenation and Ventilation  Outcome: Progressing  Intervention: Promote Airway Secretion Clearance  Recent Flowsheet Documentation  Taken 8/31/2023 1316 by Cynthia Alatorre RN  Cough And Deep Breathing: done independently per patient  Taken 8/31/2023 0800 by Cynthia Alatorre RN  Cough And Deep Breathing: done independently per patient   Goal Outcome Evaluation:    Heart Failure Care Map  GOALS TO BE MET BEFORE DISCHARGE:    1. Decrease congestion and/or edema with diuretic therapy to achieve near optimal volume status.     Dyspnea improved: No, further care required to meet this goal. Please explain Still reports SOB with ambulation. Required O2 when in MR stress   Edema improved: No, further care required to meet this goal. Please explain Patient feels that her legs are still slightly swollen from her baseline        Last 24 hour I/O:   Intake/Output Summary (Last 24 hours) at 8/31/2023 1456  Last data filed at 8/31/2023 0800  Gross per 24 hour   Intake 700 ml   Output 1200 ml   Net -500 ml           Net I/O and Weights since admission:   08/01 1500 - 08/31 1459  In: 4197 [P.O.:4197]  Out: 20016 [Urine:35748]  Net: -8278     Vitals:    08/26/23 2132 08/27/23 0132 08/27/23 0529 08/28/23 0339   Weight: 113.4 kg (250 lb) 113.3 kg (249 lb 11.2 oz) 113.3 kg (249 lb 11.2 oz) 110.6 kg (243 lb 12.8 oz)    08/29/23 0451 08/30/23 0344 08/31/23 0653   Weight: 111.1 kg (245 lb) 108.3 kg (238 lb 12.8 oz) 107.6 kg (237 lb 3.4 oz)       2.  O2 sats > 90% on room air, or at prior home O2 therapy level.      Able to wean O2 this shift to keep sats above 90%?: No, further care required to meet this goal. Please explain Did remove O2 until patient went to MRI and they had to put her back on   Does patient use Home O2? Yes @ NOC         Current oxygenation status:   SpO2: 93 %     O2 Device: Nasal cannula, Oxygen Delivery: 2  LPM    3.  Tolerates ambulation and mobility near baseline.     Ambulation: Yes, satisfactory for discharge.   Times patient ambulated with staff this shift: 2    Please review the Heart Failure Care Map for additional HF goal outcomes.    Cynthia Alatorre RN  8/31/2023

## 2023-09-01 ENCOUNTER — APPOINTMENT (OUTPATIENT)
Dept: OCCUPATIONAL THERAPY | Facility: HOSPITAL | Age: 53
DRG: 291 | End: 2023-09-01
Payer: COMMERCIAL

## 2023-09-01 VITALS
WEIGHT: 234.3 LBS | OXYGEN SATURATION: 90 % | RESPIRATION RATE: 20 BRPM | DIASTOLIC BLOOD PRESSURE: 70 MMHG | TEMPERATURE: 98 F | HEART RATE: 99 BPM | HEIGHT: 64 IN | BODY MASS INDEX: 40 KG/M2 | SYSTOLIC BLOOD PRESSURE: 106 MMHG

## 2023-09-01 LAB
ANION GAP SERPL CALCULATED.3IONS-SCNC: 8 MMOL/L (ref 7–15)
ATRIAL RATE - MUSE: 104 BPM
ATRIAL RATE - MUSE: 96 BPM
BUN SERPL-MCNC: 13.7 MG/DL (ref 6–20)
CALCIUM SERPL-MCNC: 8.7 MG/DL (ref 8.6–10)
CHLORIDE SERPL-SCNC: 102 MMOL/L (ref 98–107)
CREAT SERPL-MCNC: 0.82 MG/DL (ref 0.51–0.95)
DEPRECATED HCO3 PLAS-SCNC: 29 MMOL/L (ref 22–29)
DIASTOLIC BLOOD PRESSURE - MUSE: NORMAL MMHG
DIASTOLIC BLOOD PRESSURE - MUSE: NORMAL MMHG
GFR SERPL CREATININE-BSD FRML MDRD: 85 ML/MIN/1.73M2
GLUCOSE SERPL-MCNC: 155 MG/DL (ref 70–99)
INTERPRETATION ECG - MUSE: NORMAL
INTERPRETATION ECG - MUSE: NORMAL
P AXIS - MUSE: 60 DEGREES
P AXIS - MUSE: 84 DEGREES
PLATELET # BLD AUTO: 181 10E3/UL (ref 150–450)
POTASSIUM SERPL-SCNC: 3.9 MMOL/L (ref 3.4–5.3)
PR INTERVAL - MUSE: 146 MS
PR INTERVAL - MUSE: 152 MS
QRS DURATION - MUSE: 134 MS
QRS DURATION - MUSE: 148 MS
QT - MUSE: 420 MS
QT - MUSE: 454 MS
QTC - MUSE: 552 MS
QTC - MUSE: 573 MS
R AXIS - MUSE: -10 DEGREES
R AXIS - MUSE: 87 DEGREES
SODIUM SERPL-SCNC: 139 MMOL/L (ref 136–145)
SYSTOLIC BLOOD PRESSURE - MUSE: NORMAL MMHG
SYSTOLIC BLOOD PRESSURE - MUSE: NORMAL MMHG
T AXIS - MUSE: 40 DEGREES
T AXIS - MUSE: 51 DEGREES
VENTRICULAR RATE- MUSE: 104 BPM
VENTRICULAR RATE- MUSE: 96 BPM

## 2023-09-01 PROCEDURE — 250N000013 HC RX MED GY IP 250 OP 250 PS 637: Performed by: INTERNAL MEDICINE

## 2023-09-01 PROCEDURE — 99233 SBSQ HOSP IP/OBS HIGH 50: CPT | Performed by: INTERNAL MEDICINE

## 2023-09-01 PROCEDURE — 85049 AUTOMATED PLATELET COUNT: CPT | Performed by: INTERNAL MEDICINE

## 2023-09-01 PROCEDURE — 250N000011 HC RX IP 250 OP 636: Mod: JZ | Performed by: INTERNAL MEDICINE

## 2023-09-01 PROCEDURE — 36415 COLL VENOUS BLD VENIPUNCTURE: CPT | Performed by: INTERNAL MEDICINE

## 2023-09-01 PROCEDURE — 99239 HOSP IP/OBS DSCHRG MGMT >30: CPT | Performed by: INTERNAL MEDICINE

## 2023-09-01 PROCEDURE — 97530 THERAPEUTIC ACTIVITIES: CPT | Mod: GO

## 2023-09-01 PROCEDURE — 80048 BASIC METABOLIC PNL TOTAL CA: CPT | Performed by: INTERNAL MEDICINE

## 2023-09-01 RX ORDER — BUMETANIDE 1 MG/1
1 TABLET ORAL DAILY
Qty: 30 TABLET | Refills: 1 | Status: SHIPPED | OUTPATIENT
Start: 2023-09-02 | End: 2023-09-14

## 2023-09-01 RX ORDER — CARVEDILOL 3.12 MG/1
3.12 TABLET ORAL 2 TIMES DAILY WITH MEALS
Qty: 60 TABLET | Refills: 1 | Status: SHIPPED | OUTPATIENT
Start: 2023-09-01 | End: 2023-09-14

## 2023-09-01 RX ORDER — CEPHALEXIN 500 MG/1
500 CAPSULE ORAL ONCE
Status: COMPLETED | OUTPATIENT
Start: 2023-09-01 | End: 2023-09-01

## 2023-09-01 RX ADMIN — GABAPENTIN 100 MG: 100 CAPSULE ORAL at 16:13

## 2023-09-01 RX ADMIN — CARVEDILOL 3.12 MG: 3.12 TABLET, FILM COATED ORAL at 09:56

## 2023-09-01 RX ADMIN — ENOXAPARIN SODIUM 40 MG: 40 INJECTION SUBCUTANEOUS at 06:13

## 2023-09-01 RX ADMIN — FLUTICASONE FUROATE AND VILANTEROL TRIFENATATE 1 PUFF: 200; 25 POWDER RESPIRATORY (INHALATION) at 09:39

## 2023-09-01 RX ADMIN — SACUBITRIL AND VALSARTAN 1 TABLET: 24; 26 TABLET, FILM COATED ORAL at 09:34

## 2023-09-01 RX ADMIN — GABAPENTIN 100 MG: 100 CAPSULE ORAL at 09:33

## 2023-09-01 RX ADMIN — CEPHALEXIN 500 MG: 500 CAPSULE ORAL at 16:13

## 2023-09-01 RX ADMIN — DULOXETINE HYDROCHLORIDE 60 MG: 60 CAPSULE, DELAYED RELEASE PELLETS ORAL at 09:33

## 2023-09-01 RX ADMIN — EMPAGLIFLOZIN 10 MG: 10 TABLET, FILM COATED ORAL at 09:34

## 2023-09-01 RX ADMIN — UMECLIDINIUM 1 PUFF: 62.5 AEROSOL, POWDER ORAL at 09:39

## 2023-09-01 RX ADMIN — CEPHALEXIN 500 MG: 500 CAPSULE ORAL at 09:34

## 2023-09-01 RX ADMIN — BUMETANIDE 1 MG: 1 TABLET ORAL at 09:33

## 2023-09-01 ASSESSMENT — ACTIVITIES OF DAILY LIVING (ADL)
ADLS_ACUITY_SCORE: 33
ADLS_ACUITY_SCORE: 32
ADLS_ACUITY_SCORE: 33
ADLS_ACUITY_SCORE: 32
ADLS_ACUITY_SCORE: 33

## 2023-09-01 NOTE — PLAN OF CARE
Goal Outcome Evaluation:  A/O. AVS discussed with patient. Self help Guide booklet reviewed, diet, getting weights and recording, new Heart Failure medications prescribed.   Pt received 02 tank.    Pt's own medications (Citrucel, Breo Ellipta, Incruse Ellipta Inhaler ) and belongings were returned.   Waiting for son to  transport patient home.     Reminded patient to call [facility] when she gets home tonight re:

## 2023-09-01 NOTE — PLAN OF CARE
Goal Outcome Evaluation:               Patient alert and oriented.  Denied pain and shortness of breath.  Reported congested cough.  CPAP on from 2300 until 0300, then patient wanted it removed and nasal cannula at 2 L/min was applied.  Patient's oxygen saturation decreased when on room air.  Linen changed, patient weighed on standing scale.  Pleasant with staff.  Patient's family brought in some bags of chips for her.  RN education on sodium restriction, and why following that was important.  Currently resting.  Will continue to monitor.       Heart Failure Care Map  GOALS TO BE MET BEFORE DISCHARGE:    1. Decrease congestion and/or edema with diuretic therapy to achieve near optimal volume status.     Dyspnea improved: Yes, satisfactory for discharge.   Edema improved: Yes, satisfactory for discharge.        Last 24 hour I/O:   Intake/Output Summary (Last 24 hours) at 9/1/2023 0438  Last data filed at 9/1/2023 0436  Gross per 24 hour   Intake 360 ml   Output 850 ml   Net -490 ml           Net I/O and Weights since admission:   08/02 0700 - 09/01 0659  In: 4557 [P.O.:4557]  Out: 86027 [Urine:42398]  Net: -8568     Vitals:    08/26/23 2132 08/27/23 0132 08/27/23 0529 08/28/23 0339   Weight: 113.4 kg (250 lb) 113.3 kg (249 lb 11.2 oz) 113.3 kg (249 lb 11.2 oz) 110.6 kg (243 lb 12.8 oz)    08/29/23 0451 08/30/23 0344 08/31/23 0653 09/01/23 0435   Weight: 111.1 kg (245 lb) 108.3 kg (238 lb 12.8 oz) 107.6 kg (237 lb 3.4 oz) 106.3 kg (234 lb 4.8 oz)       2.  O2 sats > 90% on room air, or at prior home O2 therapy level.      Able to wean O2 this shift to keep sats above 90%?: No, further care required to meet this goal. Please explain required 2 L/min nasal cannula when not on CPAP   Does patient use Home O2? No          Current oxygenation status:   SpO2: 92 %     O2 Device: Nasal cannula, Oxygen Delivery: 2 LPM    3.  Tolerates ambulation and mobility near baseline.     Ambulation: No, further care required to meet  this goal. Please explain patient rested in bed over night   Times patient ambulated with staff this shift: n/a    Please review the Heart Failure Care Map for additional HF goal outcomes.    Carrie Sales RN  9/1/2023

## 2023-09-01 NOTE — PLAN OF CARE
Goal Outcome Evaluation:       Heart Failure Care Map  GOALS TO BE MET BEFORE DISCHARGE:    1. Decrease congestion and/or edema with diuretic therapy to achieve near optimal volume status.     Dyspnea improved: Yes, satisfactory for discharge.   Edema improved: Yes, satisfactory for discharge.        Last 24 hour I/O:   Intake/Output Summary (Last 24 hours) at 9/1/2023 1355  Last data filed at 9/1/2023 1049  Gross per 24 hour   Intake 843 ml   Output 1250 ml   Net -407 ml           Net I/O and Weights since admission:   08/02 1500 - 09/01 1459  In: 5040 [P.O.:5037; I.V.:3]  Out: 49165 [Urine:32211]  Net: -8685     Vitals:    08/26/23 2132 08/27/23 0132 08/27/23 0529 08/28/23 0339   Weight: 113.4 kg (250 lb) 113.3 kg (249 lb 11.2 oz) 113.3 kg (249 lb 11.2 oz) 110.6 kg (243 lb 12.8 oz)    08/29/23 0451 08/30/23 0344 08/31/23 0653 09/01/23 0435   Weight: 111.1 kg (245 lb) 108.3 kg (238 lb 12.8 oz) 107.6 kg (237 lb 3.4 oz) 106.3 kg (234 lb 4.8 oz)       2.  O2 sats > 90% on room air, or at prior home O2 therapy level.      Able to wean O2 this shift to keep sats above 90%?: Yes, satisfactory for discharge.   Does patient use Home O2? Yes-  yes          Current oxygenation status:   SpO2: 90 %     O2 Device: Nasal cannula, Oxygen Delivery: 1 LPM    3.  Tolerates ambulation and mobility near baseline.     Ambulation: Yes, satisfactory for discharge.   Times patient ambulated with staff this shift: 2    Please review the Heart Failure Care Map for additional HF goal outcomes.    Judit Ibrahim RN  9/1/2023        Pt is A/O. VSS. Ambulated stand by to the bathroom and hallway. No pain. RA AM oxygen dropped bellow 89%, changed to NC 1L SpO2 increased to 96% while in bed. Home oxygen evaluation done x2 SpO2 was 88% with activity. The plan is going home with oxygen.

## 2023-09-01 NOTE — PROGRESS NOTES
[facility]    Medicine Progress Note - Hospitalist Service    Date of Admission:  8/26/2023    Assessment & Plan   52 year old female with history of COPD/asthma overlap syndrome, SAM not on CPAP recently, tobacco use and obesity, presented on 8/26/2023 with new onset CHF.     New onset HFrEF: No prior history of CHF.  Echocardiogram showed severely reduced LVEF 15-20% with severe global hypokinesia of the left ventricle, mildly decreased right ventricular systolic function and moderate MR.   -- continue current bumex  -- continue coreg, jardiance, entresto  -- Cardiac stress MRI negative for inducible ischemia  -- plan outpatient cardiology follow up        Acute hypoxemic respiratory failure: Oxygen saturation 86% on room air on admission. COVID tested negative. No pulmonary focal infiltrate.  Still requires supplemental O2.   -- Wean off supplemental O2 as tolerated  -- obtain home oxygen evaluation  -- possible discharge today if home oxygen can get set up if patient qualifies  -- RN CM assistance with broken CPAP machine and home O2       Obstructive sleep apnea: Used to use CPAP until broken a few years ago and has not used it since. Was on nocturnal oxygen prior to admission  -- Supplemental oxygen as above  -- CM to assist with helping patient with home CPAP          History of COPD asthma overlap syndrome: CTA chest shows emphysematous changes in the lungs. Currently not on exacerbation.   --Continue home inhalers.       Transaminitis: likely be related to underlying new onset CHF/congestion  -- trend as outpatient       Klebsiella UTI: developed blood tinged urine and dysuria on 8/28.  Culture sensitivities demonstrate resistance to ciprofloxacin  --switched to Keflex to complete course 9/1       Anxiety and depression: Continue home cogentin, cymbalta, seroquel       Diet: 2 Gram Sodium Diet    DVT Prophylaxis: Enoxaparin (Lovenox) SQ  Brown Catheter: Not present  Lines: None    "  Cardiac Monitoring: ACTIVE order. Indication: Chest pain/ ACS rule out (24 hours)  Code Status: Full Code      Clinically Significant Risk Factors                   # Chronic heart failure with reduced ejection fraction: last echo with EF <40%       # Severe Obesity: Estimated body mass index is 40.22 kg/m  as calculated from the following:    Height as of this encounter: 1.626 m (5' 4\").    Weight as of this encounter: 106.3 kg (234 lb 4.8 oz).             Disposition Plan      Expected Discharge Date: 09/01/2023,  3:00 PM    Destination: home  Discharge Comments: Pending results of cardiac MRI, final cards recs          Karri Hogan, DO  Hospitalist Service  [facility]  Securely message with [employer] (more info)  Text page via [employer] Paging/Directory   ______________________________________________________________________    Interval History    NAD. Has significant HART    Physical Exam   Vital Signs: Temp: 98.2  F (36.8  C) Temp src: Oral BP: 107/71 Pulse: 89   Resp: 20 SpO2: 90 % O2 Device: Nasal cannula Oxygen Delivery: 1 LPM  Weight: 234 lbs 4.8 oz  General: NAD  RESPIRATORY: Breathing nonlabored  CARDIOVASCULAR: 1+ le edema bilat.   NEUROLOGIC: Motor intact, speech clear         Medical Decision Making       >50 MINUTES SPENT BY ME on the date of service doing chart review, history, exam, documentation & further activities per the note.      Data     "

## 2023-09-01 NOTE — PROGRESS NOTES
Patient has been assessed for Home Oxygen needs. Oxygen readings:    *Pulse oximetry (SpO2) = 96% on room air at rest while awake.    *SpO2 = 88% on room air during activity/with exercise.    *SpO2 improved to 93% on 1liters/minute during activity/with exercise.   Judit Ibrahim RN     Notified MD.

## 2023-09-01 NOTE — DISCHARGE INSTRUCTIONS
When you get home tonight 9/1/2023 you need to call [employer] [facility] [employer] ([phone]) and pick on call option (they are open 24/7) they will be able to trouble shoot why your oxygen equipment is not working and if it truly isn't working they will bring you out new equipment to your home tonight.

## 2023-09-01 NOTE — PROGRESS NOTES
Care Management Discharge Note    Discharge Date: 09/01/2023       Discharge Disposition:  Home     Discharge Services:  none    Discharge DME:  oxygen equipment and supplies    Discharge Transportation: family or friend will provide    Private pay costs discussed: Not applicable    Does the patient's insurance plan have a 3 day qualifying hospital stay waiver?  No    PAS Confirmation Code:    Patient/family educated on Medicare website which has current facility and service quality ratings:      Education Provided on the Discharge Plan:    Persons Notified of Discharge Plans: yes  Patient/Family in Agreement with the Plan:      Handoff Referral Completed: Yes    Additional Information:  Pt adequate for discharge. Pt will need oxygen for bedtime and with activity, which is different because she used to only require at bedtime and now needs with activity and bedtime. Pt has O2 at home but it is not working properly. Earlier in the week OLE tried to assist pt with this but we were unable to determine who her supplier is. CM called [employer] and they told CM they are not her supplier. Pt will have new equipment ordered with [employer] and has been instructed that when she gets home she needs to call the number on her oxygen equipment and they can instruct her on how to return the equipment that is not working. CM called [employer] to confirm that they got the new orders and are able to deliver the equipment today, and they told CM they will be delivered within 2 hours. OLE then met with the pt and she told CM that she does not have her keys and she does not have a ride because her son is at work. When asked if her son could pick her up after work she said yes. Her son will be done with work at 1930 and her O2 equipment should be here by then. Nursing and Provider updated and ok with plan.    Kena Melton RN

## 2023-09-01 NOTE — PLAN OF CARE
Goal Outcome Evaluation:    Pt alert and oriented.  Denies pain.   Entresto held, parameters not met /80.  SR w/ BBB

## 2023-09-01 NOTE — DISCHARGE SUMMARY
[facility]  Hospitalist Discharge Summary      Date of Admission:  8/26/2023  Date of Discharge:  9/1/2023  Discharging Provider: Karri Hogan, DO  Discharge Service: Hospitalist Service        Follow-ups Needed After Discharge   Follow-up Appointments     Follow-up and recommended labs and tests       Follow up with primary care provider, Reymundo Avila, within 3-5 days   for hospital follow- up.  CMP            Unresulted Labs Ordered in the Past 30 Days of this Admission       No orders found from 7/27/2023 to 8/27/2023.        These results will be followed up by Hospitalist results pool    Discharge Disposition   Discharged to home  Condition at discharge: Stable      Hospital Course   52 year old female with history of COPD/asthma overlap syndrome, SAM not on CPAP recently, tobacco use and obesity, presented on 8/26/2023 with new onset CHF.     New onset HFrEF: No prior history of CHF.  Echocardiogram showed severely reduced LVEF 15-20% with severe global hypokinesia of the left ventricle, mildly decreased right ventricular systolic function and moderate MR.   NSTEMI, considered and ruled out   Cardiac stress MRI negative for inducible ischemia  -- continue coreg, jardiance, entresto and bumex per dosing below after discharge  -- plan outpatient cardiology follow up        Acute hypoxemic respiratory failure: Oxygen saturation 86% on room air on admission. COVID tested negative. No pulmonary focal infiltrate.  Still requires supplemental O2 with activity  -- home oxygen evaluation shows that patient qualifies for home oxygen 1LPM with activity in addition to nocturnal oxygen that patient was on prior to admission  -- RN CM assistance with broken CPAP machine at home        Obstructive sleep apnea: Used to use CPAP until broken a few years ago and has not used it since. Was on nocturnal oxygen prior to admission  -- Supplemental oxygen as above  -- CM to assist with helping  patient with home CPAP           History of COPD asthma overlap syndrome: CTA chest shows emphysematous changes in the lungs. Currently not on exacerbation.   --Continue home inhalers.        Transaminitis: likely be related to underlying new onset CHF/congestion  -- trend as outpatient        Klebsiella UTI: developed blood tinged urine and dysuria on 8/28.  Culture sensitivities demonstrate resistance to ciprofloxacin  --switched to Keflex to complete course 9/1        Anxiety and depression: Continue home cogentin, cymbalta, seroquel       Consultations This Hospital Stay   CORE CLINIC EVALUATION IP CONSULT  OCCUPATIONAL THERAPY ADULT IP CONSULT  NUTRITION SERVICES ADULT IP CONSULT  CARE MANAGEMENT / SOCIAL WORK IP CONSULT  CARDIOLOGY IP CONSULT    Code Status   Full Code    Time Spent on this Encounter   IKarri DO, personally saw the patient today and spent greater than 30 minutes discharging this patient.       Karri Hogan DO  [facility] [address] [address]  Phone: [phone]  Fax: [phone]  ______________________________________________________________________    Physical Exam   Vital Signs: Temp: 98.2  F (36.8  C) Temp src: Oral BP: 107/71 Pulse: 89   Resp: 20 SpO2: 90 % O2 Device: Nasal cannula Oxygen Delivery: 1 LPM  Weight: 234 lbs 4.8 oz       Primary Care Physician   Reymundo Avila    Discharge Orders      Follow-Up with Cardiology      Reason for your hospital stay    New CHF     Follow-up and recommended labs and tests     Follow up with primary care provider, Reymundo Avila, within 3-5 days for hospital follow- up.  BMP     Activity    Your activity upon discharge: activity as tolerated     Home Oxygen Order for DME - ONLY FOR DME    I, the undersigned, certify that the above prescribed supplies are medically necessary for this patient and is both reasonable and necessary in reference to accepted standards of  medical and necessary in reference to accepted standards of medical practice in the treatment of this patient's condition and is not prescribed as a convenience.      Diet    Follow this diet upon discharge: Orders Placed This Encounter      2 Gram Sodium Diet     \    Discharge Medications   Current Discharge Medication List        START taking these medications    Details   bumetanide (BUMEX) 1 MG tablet Take 1 tablet (1 mg) by mouth daily  Qty: 30 tablet, Refills: 1    Associated Diagnoses: New onset of congestive heart failure (H)      carvedilol (COREG) 3.125 MG tablet Take 1 tablet (3.125 mg) by mouth 2 times daily (with meals)  Qty: 60 tablet, Refills: 1    Associated Diagnoses: New onset of congestive heart failure (H)      empagliflozin (JARDIANCE) 10 MG TABS tablet Take 1 tablet (10 mg) by mouth daily  Qty: 90 tablet, Refills: 1    Associated Diagnoses: New onset of congestive heart failure (H)      sacubitril-valsartan (ENTRESTO) 24-26 MG per tablet Take 1 tablet by mouth 2 times daily  Qty: 60 tablet, Refills: 1    Associated Diagnoses: New onset of congestive heart failure (H)           CONTINUE these medications which have NOT CHANGED    Details   albuterol (PROAIR HFA/PROVENTIL HFA/VENTOLIN HFA) 108 (90 Base) MCG/ACT inhaler Inhale 2 puffs into the lungs every 6 hours  Qty: 18 g, Refills: 11    Comments: Pharmacy may dispense brand covered by insurance (Proair, or proventil or ventolin or generic albuterol inhaler)  Associated Diagnoses: Asthma-COPD overlap syndrome (H)      albuterol (PROVENTIL) (2.5 MG/3ML) 0.083% neb solution Take 1 vial (2.5 mg) by nebulization every 6 hours as needed for shortness of breath / dyspnea or wheezing  Qty: 90 mL, Refills: 3    Associated Diagnoses: Simple chronic bronchitis (H)      benztropine (COGENTIN) 0.5 MG tablet Take 0.5 mg by mouth At Bedtime      DULoxetine (CYMBALTA) 30 MG capsule Take 60 mg by mouth daily      fluticasone-vilanterol (BREO ELLIPTA) 200-25  MCG/INH inhaler Inhale 1 puff into the lungs daily  Qty: 60 each, Refills: 11    Associated Diagnoses: Simple chronic bronchitis (H)      !! gabapentin (NEURONTIN) 100 MG capsule Take 100 mg by mouth 2 times daily      !! gabapentin (NEURONTIN) 400 MG capsule Take 400 mg by mouth At Bedtime      methylcellulose (CITRUCEL) 500 MG TABS tablet Take 500 mg by mouth daily      nicotine (NICORETTE) 4 MG lozenge Place 4 mg inside cheek as needed for smoking cessation      QUEtiapine (SEROQUEL) 100 MG tablet Take 150 mg by mouth At Bedtime      umeclidinium (INCRUSE ELLIPTA) 62.5 MCG/INH inhaler Inhale 1 puff into the lungs daily  Qty: 30 each, Refills: 11    Associated Diagnoses: Simple chronic bronchitis (H)       !! - Potential duplicate medications found. Please discuss with provider.        Allergies   Allergies   Allergen Reactions    Amoxicillin Unknown     As kid had mold test and told allergic     Mold [Molds & Smuts] Unknown    Mold/Mildew [Molds & Smuts] Unknown    Other Environmental Allergy Unknown     DUST    Penicillins Unknown     As kid had mold test and told allergic     Pollen [Pollen Extract] Unknown

## 2023-09-01 NOTE — PROGRESS NOTES
I certify that this patient, Eva Parikh has been under my care (or a nurse practitioner or physican's assistant working with me). This is the face-to-face encounter for oxygen medical necessity.      At the time of this encounter supplemental oxygen is reasonable and necessary and is expected to improve the patient's condition in a home setting.       Patient has continued oxygen desaturation due to Moderate Persistent Asthma J45.40  Chronic Heart Failure I50.    If portability is ordered, is the patient mobile within the home? yes

## 2023-09-01 NOTE — PLAN OF CARE
Occupational Therapy Discharge Summary    Reason for therapy discharge:    Discharged to home.    Progress towards therapy goal(s). See goals on Care Plan in [facility] electronic health record for goal details.  Goals partially met.  Barriers to achieving goals:   discharge from facility.    Therapy recommendation(s):    Continued therapy is recommended.  Rationale/Recommendations:  decreased ADLs.    Goal Outcome Evaluation:    Mary Reyes, OTR/LANETTE    9/1/2023

## 2023-09-01 NOTE — PROGRESS NOTES
HEART CARE NOTE          Assessment/Recommendations     1. Severe Biventricular failure c/b severe ADHF  Assessment / Plan  Tolerating oral diuretic regimen - no changes to regimen at this time  S/p stress MRI - awaiting results  Continue to monitor renal function/repeat BMP, UOP and hemodynamics closely   GDMT as detailed below     Current Pharmacotherapy AHA Guideline-Directed Medical Therapy   Sacubitril- valsartan 24-26 mg BID Lisinopril 20 mg twice daily   Carvedilol 3.125  mg twice daily Carvedilol 25 mg twice daily   Spironolactone not started Spironolactone 25 mg once daily   Hydralazine NA Hydralazine 100 mg three times daily   Isosorbide dinitrate NA Isosorbide dinitrate 40 mg three times daily   SGLT2 inhibitor:Dapagliflozin/Empagliflozin - not started Dapagliflozin or Empagliflozin 10 mg daily      2. Valvular heart disease  Assessment / Plan  Moderate MR- routine imagine surveillance indicated     3. Acute hypoxic respiratory failure  Assessment / Plan  Likely 2/2 ADHF/cardiogenic pulmonary edema - diuresis as above     4. SAM  Assessment / Plan  Management per primary team - plan for outpatient sleep study     5. AILYN  Assessment / Plan  Resolving - continue to monitor UOP and renal function closely    Plan of care discussed on September 1, 2023 with patient at bedside, and primary team overseeing patient's care    50 minutes spent reviewing prior records (including documentation, laboratory studies, cardiac testing/imaging), history and physical exam, planning, and subsequent documentation.    Cardiology team will sign-off for now. Please do not hesitate to consult us again if new questions or concerns arise. Follow-up appointment will be arranged by [facility] clinic.     History of Present Illness/Subjective    Ms. Eva Parikh is a 52 year old female with a PMHx significant for (per Dr. Espino's note) COPD/asthma overlap syndrome, obstructive sleep apnea not using her CPAP with no previous  "history of cardiac disease found to be in new onset ADHF.     Today, Mrs. Parikh wihtout any acute cardiac events or complaints; Management plan as detailed above     ECG: personally reviewed; sinus tachycardia.     ECHO (personnaly Reviewed):   1. Left ventricular function is decreased. The ejection fraction is 15-20%  (severely reduced). There is severe global hypokinesia of the left ventricle.  2. The right ventricle is normal size. Mildly decreased right ventricular  systolic function  3. The left atrium is moderately dilated.  4. There is moderate (2+) mitral regurgitation. The mitral regurgitant jet is  eccentrically directed.  5. High RA pressure estimated at 15 mmHg or greater.    Telemetry: personally reviewed September 1, 2023; notable for NSR     Lab results: personally reviewed September 1, 2023; notable for renal function wnl    Medical history and pertinent documents reviewed in Care Everywhere please where applicable see details above        Physical Examination Review of Systems   BP (!) 147/71 (BP Location: Right arm)   Pulse 96   Temp 98.2  F (36.8  C) (Oral)   Resp 18   Ht 1.626 m (5' 4\")   Wt 106.3 kg (234 lb 4.8 oz)   SpO2 92%   BMI 40.22 kg/m    Body mass index is 40.22 kg/m .  Wt Readings from Last 3 Encounters:   09/01/23 106.3 kg (234 lb 4.8 oz)   08/29/22 113.4 kg (250 lb)   03/05/21 100.7 kg (222 lb)     General Appearance:   no distress, normal body habitus   ENT/Mouth: membranes moist, no oral lesions or bleeding gums.      EYES:  no scleral icterus, normal conjunctivae   Neck: no carotid bruits or thyromegaly   Chest/Lungs:   lungs are clear to auscultation, no rales or wheezing, equal chest wall expansion    Cardiovascular:   Regular. Normal first and second heart sounds with no murmurs, rubs, or gallops; the carotid, radial and posterior tibial pulses are intact, no JVD or LE edema bilaterally    Abdomen:  no organomegaly, masses, bruits, or tenderness; bowel sounds are " present   Extremities: no cyanosis or clubbing   Skin: no xanthelasma, warm.    Neurologic: NAD     Psychiatric: alert and oriented x3, calm     A complete 10 systems ROS was reviewed  And is negative except what is listed in the HPI.          Medical History  Surgical History Family History Social History   Past Medical History:   Diagnosis Date    Anxiety     Arthritis     Asthma     COPD (chronic obstructive pulmonary disease) (H)     Depression     Past Surgical History:   Procedure Laterality Date    ADENOIDECTOMY      TONSILLECTOMY      no family history of premature coronary artery disease Social History     Socioeconomic History    Marital status: Single     Spouse name: Not on file    Number of children: 1    Years of education: HS    Highest education level: Not on file   Occupational History    Not on file   Tobacco Use    Smoking status: Former     Packs/day: 1.00     Types: Cigarettes     Quit date: 1/1/2020     Years since quitting: 3.6    Smokeless tobacco: Never    Tobacco comments:     Decreased from 2 ppd to 1 ppd since May 2014   Substance and Sexual Activity    Alcohol use: No    Drug use: No    Sexual activity: Not Currently   Other Topics Concern    Not on file   Social History Narrative    Lives on her own.     Social Determinants of Health     Financial Resource Strain: Not on file   Food Insecurity: Not on file   Transportation Needs: Not on file   Physical Activity: Not on file   Stress: Not on file   Social Connections: Not on file   Intimate Partner Violence: Not on file   Housing Stability: Not on file           Lab Results    Chemistry/lipid CBC Cardiac Enzymes/BNP/TSH/INR   Lab Results   Component Value Date    CHOL 144 02/28/2020    HDL 41 (L) 02/28/2020    TRIG 72 02/28/2020    BUN 13.7 09/01/2023     09/01/2023    CO2 29 09/01/2023    Lab Results   Component Value Date    WBC 7.7 08/27/2023    HGB 12.2 08/27/2023    HCT 39.6 08/27/2023    MCV 91 08/27/2023      09/01/2023    Lab Results   Component Value Date    TROPONINI 0.02 01/01/2020    BNP 16 01/01/2020    TSH 0.45 08/27/2023     Lab Results   Component Value Date    TROPONINI 0.02 01/01/2020          Weight:    Wt Readings from Last 3 Encounters:   09/01/23 106.3 kg (234 lb 4.8 oz)   08/29/22 113.4 kg (250 lb)   03/05/21 100.7 kg (222 lb)       Allergies  Allergies   Allergen Reactions    Amoxicillin Unknown     As kid had mold test and told allergic     Mold [Molds & Smuts] Unknown    Mold/Mildew [Molds & Smuts] Unknown    Other Environmental Allergy Unknown     DUST    Penicillins Unknown     As kid had mold test and told allergic     Pollen [Pollen Extract] Unknown         Surgical History  Past Surgical History:   Procedure Laterality Date    ADENOIDECTOMY      TONSILLECTOMY         Social History  Tobacco:   History   Smoking Status    Former    Packs/day: 1.00    Types: Cigarettes, Cigarettes    Quit date: 1/1/2020   Smokeless Tobacco    Never     Comment: Decreased from 2 ppd to 1 ppd since May 2014    Alcohol:   Social History    Substance and Sexual Activity      Alcohol use: No   Illicit Drugs:   History   Drug Use No       Family History  Family History   Problem Relation Age of Onset    Diabetes Mother     Thyroid Disease Mother     Heart Disease Mother     Mental Illness Mother     Diabetes Maternal Grandmother     Heart Disease Maternal Grandmother     Bipolar Disorder Sister           Lauren Vázquez MD on 9/1/2023      cc: Reymundo Avila

## 2023-09-08 ENCOUNTER — TRANSFERRED RECORDS (OUTPATIENT)
Dept: HEALTH INFORMATION MANAGEMENT | Facility: CLINIC | Age: 53
End: 2023-09-08
Payer: COMMERCIAL

## 2023-09-08 ENCOUNTER — LAB REQUISITION (OUTPATIENT)
Dept: LAB | Facility: CLINIC | Age: 53
End: 2023-09-08

## 2023-09-08 DIAGNOSIS — I50.9 HEART FAILURE, UNSPECIFIED (H): ICD-10-CM

## 2023-09-08 DIAGNOSIS — Z13.220 ENCOUNTER FOR SCREENING FOR LIPOID DISORDERS: ICD-10-CM

## 2023-09-08 LAB
ALBUMIN SERPL BCG-MCNC: 4 G/DL (ref 3.5–5.2)
ALP SERPL-CCNC: 72 U/L (ref 35–104)
ALT SERPL W P-5'-P-CCNC: 47 U/L (ref 0–50)
ANION GAP SERPL CALCULATED.3IONS-SCNC: 13 MMOL/L (ref 7–15)
AST SERPL W P-5'-P-CCNC: 31 U/L (ref 0–45)
BILIRUB SERPL-MCNC: 0.5 MG/DL
BUN SERPL-MCNC: 15.2 MG/DL (ref 6–20)
CALCIUM SERPL-MCNC: 9.2 MG/DL (ref 8.6–10)
CHLORIDE SERPL-SCNC: 100 MMOL/L (ref 98–107)
CHOLEST SERPL-MCNC: 154 MG/DL
CREAT SERPL-MCNC: 0.9 MG/DL (ref 0.51–0.95)
DEPRECATED HCO3 PLAS-SCNC: 24 MMOL/L (ref 22–29)
EGFRCR SERPLBLD CKD-EPI 2021: 76 ML/MIN/1.73M2
GLUCOSE SERPL-MCNC: 111 MG/DL (ref 70–99)
HDLC SERPL-MCNC: 28 MG/DL
LDLC SERPL CALC-MCNC: 93 MG/DL
NONHDLC SERPL-MCNC: 126 MG/DL
POTASSIUM SERPL-SCNC: 4.1 MMOL/L (ref 3.4–5.3)
PROT SERPL-MCNC: 6.1 G/DL (ref 6.4–8.3)
SODIUM SERPL-SCNC: 137 MMOL/L (ref 136–145)
TRIGL SERPL-MCNC: 166 MG/DL

## 2023-09-08 PROCEDURE — 80061 LIPID PANEL: CPT | Performed by: NURSE PRACTITIONER

## 2023-09-08 PROCEDURE — 80053 COMPREHEN METABOLIC PANEL: CPT | Performed by: NURSE PRACTITIONER

## 2023-09-12 DIAGNOSIS — J41.0 SIMPLE CHRONIC BRONCHITIS (H): ICD-10-CM

## 2023-09-12 RX ORDER — FLUTICASONE FUROATE AND VILANTEROL 200; 25 UG/1; UG/1
1 POWDER RESPIRATORY (INHALATION) DAILY
Qty: 60 EACH | Refills: 0 | Status: SHIPPED | OUTPATIENT
Start: 2023-09-12 | End: 2023-09-13

## 2023-09-13 ENCOUNTER — OFFICE VISIT (OUTPATIENT)
Dept: PULMONOLOGY | Facility: CLINIC | Age: 53
End: 2023-09-13
Attending: INTERNAL MEDICINE
Payer: COMMERCIAL

## 2023-09-13 VITALS
SYSTOLIC BLOOD PRESSURE: 116 MMHG | HEART RATE: 92 BPM | BODY MASS INDEX: 38.76 KG/M2 | WEIGHT: 225.8 LBS | DIASTOLIC BLOOD PRESSURE: 74 MMHG | OXYGEN SATURATION: 95 %

## 2023-09-13 DIAGNOSIS — I50.9 NEW ONSET OF CONGESTIVE HEART FAILURE (H): ICD-10-CM

## 2023-09-13 DIAGNOSIS — G47.33 OSA (OBSTRUCTIVE SLEEP APNEA): ICD-10-CM

## 2023-09-13 DIAGNOSIS — J44.89 ASTHMA-COPD OVERLAP SYNDROME (H): Primary | ICD-10-CM

## 2023-09-13 DIAGNOSIS — R09.02 HYPOXIA: ICD-10-CM

## 2023-09-13 DIAGNOSIS — J41.0 SIMPLE CHRONIC BRONCHITIS (H): ICD-10-CM

## 2023-09-13 PROCEDURE — 99214 OFFICE O/P EST MOD 30 MIN: CPT | Performed by: NURSE PRACTITIONER

## 2023-09-13 RX ORDER — FLUTICASONE FUROATE AND VILANTEROL 200; 25 UG/1; UG/1
1 POWDER RESPIRATORY (INHALATION) DAILY
Qty: 60 EACH | Refills: 11 | Status: SHIPPED | OUTPATIENT
Start: 2023-09-13 | End: 2023-09-13

## 2023-09-13 RX ORDER — FLUTICASONE FUROATE AND VILANTEROL 200; 25 UG/1; UG/1
1 POWDER RESPIRATORY (INHALATION) DAILY
Qty: 60 EACH | Refills: 11 | Status: SHIPPED | OUTPATIENT
Start: 2023-09-13 | End: 2024-09-05

## 2023-09-13 RX ORDER — ALBUTEROL SULFATE 90 UG/1
2 AEROSOL, METERED RESPIRATORY (INHALATION) EVERY 6 HOURS
Qty: 18 G | Refills: 11 | Status: SHIPPED | OUTPATIENT
Start: 2023-09-13 | End: 2024-09-18

## 2023-09-13 RX ORDER — ALBUTEROL SULFATE 0.83 MG/ML
2.5 SOLUTION RESPIRATORY (INHALATION) EVERY 6 HOURS PRN
Qty: 90 ML | Refills: 3 | Status: SHIPPED | OUTPATIENT
Start: 2023-09-13 | End: 2024-09-18

## 2023-09-13 RX ORDER — UMECLIDINIUM 62.5 UG/1
1 AEROSOL, POWDER ORAL DAILY
Qty: 30 EACH | Refills: 11 | Status: SHIPPED | OUTPATIENT
Start: 2023-09-13 | End: 2024-09-18

## 2023-09-13 ASSESSMENT — ASTHMA QUESTIONNAIRES
QUESTION_4 LAST FOUR WEEKS HOW OFTEN HAVE YOU USED YOUR RESCUE INHALER OR NEBULIZER MEDICATION (SUCH AS ALBUTEROL): ONE OR TWO TIMES PER DAY
QUESTION_1 LAST FOUR WEEKS HOW MUCH OF THE TIME DID YOUR ASTHMA KEEP YOU FROM GETTING AS MUCH DONE AT WORK, SCHOOL OR AT HOME: SOME OF THE TIME
QUESTION_3 LAST FOUR WEEKS HOW OFTEN DID YOUR ASTHMA SYMPTOMS (WHEEZING, COUGHING, SHORTNESS OF BREATH, CHEST TIGHTNESS OR PAIN) WAKE YOU UP AT NIGHT OR EARLIER THAN USUAL IN THE MORNING: ONCE OR TWICE
QUESTION_5 LAST FOUR WEEKS HOW WOULD YOU RATE YOUR ASTHMA CONTROL: SOMEWHAT CONTROLLED
QUESTION_2 LAST FOUR WEEKS HOW OFTEN HAVE YOU HAD SHORTNESS OF BREATH: MORE THAN ONCE A DAY
ACT_TOTALSCORE: 13
ACT_TOTALSCORE: 13

## 2023-09-13 NOTE — PROGRESS NOTES
Outpatient Pulmonary Clinic Visit  September 13, 2023       Assessment and Plan:  vEa is a 53 year old female with history of COPD/asthma overlap syndrome, SAM not on CPAP recently, tobacco use and obesity     1. Asthma/COPD overlap syndrome: on triple inhaler therapy, continue Breo and Incruse. Reflux may be a complicating comorbidity. She will try behavioral changes.  - Due for LDCT for lung cancer screening in Aug 2024    2. SAM: has not used CPAP in many years due to recall and machine malfunction. Encouraged her to follow up with DME to get a new machine. Untreated sleep apnea is likely an exacerbating factor.    3. HFrEF: newly diagnosed. Has not followed up with cardiology outpatient yet. On Bumex and low salt diet.     4. Hypoxic respiratory failure: home oxygen evaluation inpatient shows that patient qualifies for home oxygen 1LPM with activity.     If her COPD symptoms exacerbate: prednisone 40mg x 5 days, if increased sputum volume or thickness doxycycline 100mg BID x 5 days.     Follow up: 6 months    Dianna Carrasquillo, PRO  Pulmonary Medicine  [facility]   [phone]         History:     Eva Parikh is a 51 year old female with sig h/o for COPD/asthma overlap who is here for evaluation/followup of COPD/asthma overlap syndrome.  She had a flare in June with out a clear cause.  It has been persistent since then.  She is having some reflux symptoms. She is not using her CPAP     Action plan given on 7/3/2023 for increased SOB, cough, and sputum with no improvement. She was then hospitalized from 8/26-9/1/2023 with new onset HFrEF and acute respiratory failure. She reports worsening shortness of breath and low SpO2 (88% at rest, low 80's with activity) since July when her action plan was given. Oxygen saturation 86% on room air on admission. COVID tested negative. Home oxygen evaluation shows that patient qualifies for home oxygen 1LPM with activity in addition to nocturnal oxygen that  patient was on prior to admission. She was not wearing her oxygen today for her appointment.     She had used to use a CPAP however it quit working there was a recall so she has not used it in years. She has not contacted her DME or sleep medicine since discharge from the hospital.      New onset HFrEF. No prior history of CHF. Echocardiogram showed severely reduced LVEF 15-20% with severe global hypokinesia of the left ventricle, mildly decreased right ventricular systolic function, and moderate MR. Cardiac stress MRI negative for inducible ischemia. She has been taking bumex as prescribed and is compliant with a low salt diet. Has noticed less LE edema since discharge. Denies chest pain. Plan outpatient cardiology follow up tomorrow.     Still having some SOB at rest. Denies chest tightness or wheeze.   When she forgets her Breo she will notice some tightness across her back.   She does not use albuterol often.   Cough is occasional. Has not been coughing in the last few weeks since the hospital.          Past Medical History:      Past Medical History:   Diagnosis Date    Anxiety     Arthritis     Asthma     COPD (chronic obstructive pulmonary disease) (H)     Depression           Past Surgical History:      Past Surgical History:   Procedure Laterality Date    ADENOIDECTOMY      TONSILLECTOMY            Social History:     Social History     Tobacco Use    Smoking status: Former     Packs/day: 1.00     Types: Cigarettes     Quit date: 1/1/2020     Years since quitting: 3.7    Smokeless tobacco: Never    Tobacco comments:     Decreased from 2 ppd to 1 ppd since May 2014   Substance Use Topics    Alcohol use: No          Family History:     Family History   Problem Relation Age of Onset    Diabetes Mother     Thyroid Disease Mother     Heart Disease Mother     Mental Illness Mother     Diabetes Maternal Grandmother     Heart Disease Maternal Grandmother     Bipolar Disorder Sister            Allergies:        Allergies   Allergen Reactions    Amoxicillin Unknown     As kid had mold test and told allergic     Mold [Molds & Smuts] Unknown    Mold/Mildew [Molds & Smuts] Unknown    Other Environmental Allergy Unknown     DUST    Penicillins Unknown     As kid had mold test and told allergic     Pollen [Pollen Extract] Unknown          Medications:     Current Outpatient Medications   Medication    albuterol (PROAIR HFA/PROVENTIL HFA/VENTOLIN HFA) 108 (90 Base) MCG/ACT inhaler    albuterol (PROVENTIL) (2.5 MG/3ML) 0.083% neb solution    benztropine (COGENTIN) 0.5 MG tablet    bumetanide (BUMEX) 1 MG tablet    carvedilol (COREG) 3.125 MG tablet    cholecalciferol (VITAMIN D3) 125 mcg (5000 units) capsule    DULoxetine (CYMBALTA) 30 MG capsule    empagliflozin (JARDIANCE) 10 MG TABS tablet    gabapentin (NEURONTIN) 100 MG capsule    gabapentin (NEURONTIN) 400 MG capsule    methylcellulose (CITRUCEL) 500 MG TABS tablet    nicotine (NICORETTE) 4 MG lozenge    QUEtiapine (SEROQUEL) 100 MG tablet    sacubitril-valsartan (ENTRESTO) 24-26 MG per tablet    umeclidinium (INCRUSE ELLIPTA) 62.5 MCG/ACT inhaler    fluticasone-vilanterol (BREO ELLIPTA) 200-25 MCG/ACT inhaler     No current facility-administered medications for this visit.          Review of Systems:   See HPI         Physical Exam:   /74 (BP Location: Left arm, Patient Position: Sitting, Cuff Size: Adult Large)   Pulse 92   Wt 102.4 kg (225 lb 12.8 oz)   SpO2 95%   BMI 38.76 kg/m      Physical Exam  Constitutional:       General: She is not in acute distress.     Appearance: She is obese. She is not ill-appearing or diaphoretic.   HENT:      Nose: Nose normal.   Cardiovascular:      Rate and Rhythm: Normal rate and regular rhythm.      Pulses: Normal pulses.      Heart sounds: Normal heart sounds.   Pulmonary:      Effort: Pulmonary effort is normal. No respiratory distress.      Breath sounds: No wheezing or rhonchi.   Musculoskeletal:      Right lower leg:  Edema (mild) present.      Left lower leg: Edema (mild) present.   Neurological:      Mental Status: She is alert.   Psychiatric:         Behavior: Behavior normal.             Current Laboratory Data:   All laboratory and imaging data reviewed.      CT CHEST PULMONARY EMBOLISM W CONTRAST, DATE: 8/27/2023  INDICATION: Dyspnea, hypoxia.  COMPARISON: 01/02/2020.  FINDINGS:  ANGIOGRAM CHEST: Allowing for some mild motion artifact in the lower lungs, nothing definite for pulmonary embolism. Enlargement of the central pulmonary arteries suggest pulmonary arterial hypertension. Thoracic aorta is not opacified well enough to evaluate for dissection. Negative for aneurysm. No CT evidence of right heart strain.  LUNGS AND PLEURA: Numerous benign calcified granulomas in the lungs as well as multiple tiny noncalcified pulmonary nodules also likely related to granulomatous disease. Emphysematous changes in the lungs. Mild hazy groundglass opacities in the lower lungs favored to be due to atelectasis or edema versus less likely infiltrate. Clinical correlation. Scattered areas of subpleural linear scarring and/or atelectasis. Small right and tiny left pleural effusions.  MEDIASTINUM/AXILLAE: No adenopathy. Cardiac enlargement. Trace amount of pericardial fluid. Esophagus is grossly negative.                                                             IMPRESSION:  1.  Negative for pulmonary embolism. Enlargement of the central pulmonary arteries can be seen with pulmonary arterial hypertension.  2.  Cardiac enlargement with small right and tiny left pleural effusions. Correlation for any signs of CHF or fluid overload.  3.  Mild groundglass opacities in the lower lungs favored to be due to atelectasis or edema with infiltrate felt to be less likely. Clinical correlation.  4.  Emphysematous changes in the lungs  5.  Multiple tiny calcified and noncalcified bilateral pulmonary nodules are most compatible with prior granulomatous  disease and similar to previous.    Echo 8/26/2023  Interpretation Summary  1. Left ventricular function is decreased. The ejection fraction is 15-20%  (severely reduced). There is severe global hypokinesia of the left ventricle.  2. The right ventricle is normal size. Mildly decreased right ventricular  systolic function  3. The left atrium is moderately dilated.  4. There is moderate (2+) mitral regurgitation. The mitral regurgitant jet is  eccentrically directed.  5. High RA pressure estimated at 15 mmHg or greater.

## 2023-09-13 NOTE — PATIENT INSTRUCTIONS
- be sure to call your sleep medicine doctor right away to make an appointment.   - call your CPAP company to find out how to get a new machine.     If you have worsening symptoms, questions, or need to speak with the nurse please call [phone].

## 2023-09-14 ENCOUNTER — APPOINTMENT (OUTPATIENT)
Dept: CARDIOLOGY | Facility: CLINIC | Age: 53
End: 2023-09-14
Payer: COMMERCIAL

## 2023-09-14 ENCOUNTER — OFFICE VISIT (OUTPATIENT)
Dept: CARDIOLOGY | Facility: CLINIC | Age: 53
End: 2023-09-14
Payer: COMMERCIAL

## 2023-09-14 VITALS
HEART RATE: 88 BPM | WEIGHT: 225 LBS | BODY MASS INDEX: 38.41 KG/M2 | DIASTOLIC BLOOD PRESSURE: 60 MMHG | HEIGHT: 64 IN | SYSTOLIC BLOOD PRESSURE: 82 MMHG | RESPIRATION RATE: 14 BRPM

## 2023-09-14 DIAGNOSIS — E66.812 CLASS 2 SEVERE OBESITY DUE TO EXCESS CALORIES WITH SERIOUS COMORBIDITY AND BODY MASS INDEX (BMI) OF 38.0 TO 38.9 IN ADULT (H): ICD-10-CM

## 2023-09-14 DIAGNOSIS — I42.8 NON-ISCHEMIC CARDIOMYOPATHY (H): ICD-10-CM

## 2023-09-14 DIAGNOSIS — E66.09 CLASS 2 OBESITY DUE TO EXCESS CALORIES WITH BODY MASS INDEX (BMI) OF 38.0 TO 38.9 IN ADULT, UNSPECIFIED WHETHER SERIOUS COMORBIDITY PRESENT: ICD-10-CM

## 2023-09-14 DIAGNOSIS — E66.812 CLASS 2 OBESITY DUE TO EXCESS CALORIES WITH BODY MASS INDEX (BMI) OF 38.0 TO 38.9 IN ADULT, UNSPECIFIED WHETHER SERIOUS COMORBIDITY PRESENT: ICD-10-CM

## 2023-09-14 DIAGNOSIS — I50.9 NEW ONSET OF CONGESTIVE HEART FAILURE (H): ICD-10-CM

## 2023-09-14 DIAGNOSIS — I50.20 HEART FAILURE WITH REDUCED EJECTION FRACTION (H): Primary | ICD-10-CM

## 2023-09-14 DIAGNOSIS — E66.01 CLASS 2 SEVERE OBESITY DUE TO EXCESS CALORIES WITH SERIOUS COMORBIDITY AND BODY MASS INDEX (BMI) OF 38.0 TO 38.9 IN ADULT (H): ICD-10-CM

## 2023-09-14 DIAGNOSIS — G47.33 OSA (OBSTRUCTIVE SLEEP APNEA): ICD-10-CM

## 2023-09-14 PROCEDURE — 99215 OFFICE O/P EST HI 40 MIN: CPT | Performed by: NURSE PRACTITIONER

## 2023-09-14 RX ORDER — METOPROLOL SUCCINATE 25 MG/1
25 TABLET, EXTENDED RELEASE ORAL DAILY
Qty: 90 TABLET | Refills: 1 | Status: SHIPPED | OUTPATIENT
Start: 2023-09-14 | End: 2023-12-13

## 2023-09-14 RX ORDER — BUMETANIDE 1 MG/1
1 TABLET ORAL DAILY
Qty: 30 TABLET | Refills: 1 | Status: SHIPPED | OUTPATIENT
Start: 2023-09-14 | End: 2023-10-05 | Stop reason: DRUGHIGH

## 2023-09-14 NOTE — PROGRESS NOTES
Assessment/Recommendations   Assessment:    1. Nonischemic cardiomyopathy, heart failure with reduced ejection fraction, ejection fraction 15-20%, NYHA class III: Compensated.  She states her dyspnea exertion and lower extremity edema have improved.  Her weights have been stable since discharge.  She is trying to follow a low-sodium diet.  She met with the nurse clinician for further education.  2.  SAM: Not currently wearing CPAP due to her being broken. She is working on obtaining a new one.  Discussed the correlation between untreated sleep apnea and cardiomyopathy.  3.  Hypotension: Blood pressure 82/68.  She does have occasional lightheadedness.  She will try to work on increasing fluid intake.  Medication changes noted below  4.  Obesity: BMI 38.62    Plan:  1.   Heart failure medications:  - Beta blocker therapy with carvedilol 3.125 mg twice a day discontinued start metoprolol succinate 25 mg daily  - ARNI therapy with Entresto 24-26 mg twice a day  - SGLT2 inhibitor with Jardiance 10 mg daily  - Diuretic therapy with bumetanide 1 mg daily  2.  Continue monitoring daily weights and following a low-sodium diet  3.  Obtain a new CPAP as soon as possible  4.  Encouraged regular activity    Eva Parikh will follow up with Dr. Vázquez in 2 months and in the heart failure clinic in 3 weeks.     History of Present Illness/Subjective    Ms. Eva Parikh is a 53 year old female seen at [facility] heart failure clinic today for continued follow-up.  Her son Jakub accompanies her today.  She follows up for heart failure with reduced ejection fraction, nonischemic cardiomyopathy.  She was hospitalized August 26 to September 1 with new heart failure.  She had symptoms of lower extremity edema and shortness of breath for about a month.  She was diuresed and symptoms improved.  She had an echocardiogram which showed an ejection fraction of 15 to 20% with moderate mitral regurgitation.  She had a  stress cardiac MRI which was negative for inducible myocardial ischemia or infarction.  She was started on appropriate medications at discharge.  She has a past medical history significant for SAM not on CPAP due to hers being broken, COPD, asthma, obesity, anxiety.    Today, she states her dyspnea on exertion and lower extremity edema have improved.  She has occasional lightheadedness.  She denies shortness of breath, orthopnea, PND, palpitations, chest pain, abdominal fullness/bloating, and lower extremity edema.      She is monitoring home weights which are stable between 222-225 pounds.  She is following a low sodium diet.       ECHOCARDIOGRAM: 8/27/2023  Interpretation Summary     1. Left ventricular function is decreased. The ejection fraction is 15-20%  (severely reduced). There is severe global hypokinesia of the left ventricle.  2. The right ventricle is normal size. Mildly decreased right ventricular  systolic function  3. The left atrium is moderately dilated.  4. There is moderate (2+) mitral regurgitation. The mitral regurgitant jet is  eccentrically directed.  5. High RA pressure estimated at 15 mmHg or greater.       Stress cardiac MRI: 8/31/2023       FINAL IMPRESSION   ==========================================================================================================     1.  Pharmacological Regadenoson stress cardiac MRI is negative for inducible myocardial ischemia.  2.  Pharmacological Regadenoson stress ECG is negative for inducible myocardial ischemia.  3.  Severe left ventricular enlargement, normal wall thickness and severe global hypokinesis.  No left  ventricular thrombus formation.  The calculated left ventricular ejection fraction is 19%.  T1 mapping  value is 1123 ms (normal reference less than 1000 ms).    4.  Significant left ventricular trabeculation with ratio of noncompaction over compaction more than 2.3.  This finding indicates noncompaction cardiomyopathy (if no other  "etiology of cardiomyopathy).  5.  No previous myocardial infarction, scar or other infiltrative process.  There are delayed gadolinium  enhancement in right ventricular insertion.  6.  Normal right ventricular size with mildly reduced right ventricular systolic function.  7.  Dilated left atrium.  8.  No significant valvular disease.  9.  Trace pericardial effusion.        Physical Examination Review of Systems   BP (!) 82/60   Pulse 88   Resp 14   Ht 1.626 m (5' 4\")   Wt 102.1 kg (225 lb)   BMI 38.62 kg/m    Body mass index is 38.62 kg/m .  Wt Readings from Last 3 Encounters:   09/14/23 102.1 kg (225 lb)   09/13/23 102.4 kg (225 lb 12.8 oz)   09/01/23 106.3 kg (234 lb 4.8 oz)       General Appearance:   no acute distress   ENT/Mouth: No abnormalities   EYES:  no scleral icterus, normal conjunctivae   Neck: no thyromegaly   Chest/Lungs:   lungs are decreased to auscultation, no rales or wheezing, equal chest wall expansion    Cardiovascular:   Regular. Normal first and second heart sounds with no murmurs, rubs, or gallops, no edema bilaterally    Abdomen:  Obese, bowel sounds are present   Extremities: no cyanosis or clubbing   Skin: warm   Neurologic: no tremors     Psychiatric: alert and oriented x3    Enc Vitals  BP: (!) 82/60  Pulse: 88  Resp: 14  Weight: 102.1 kg (225 lb) (With shoes.)  Height: 162.6 cm (5' 4\")                                         Medical History  Surgical History Family History Social History   Past Medical History:   Diagnosis Date    Anxiety     Arthritis     Asthma     Class 2 obesity due to excess calories with body mass index (BMI) of 38.0 to 38.9 in adult 09/14/2023    Congestive heart failure (H)     COPD (chronic obstructive pulmonary disease) (H)     Depression     Heart failure with reduced ejection fraction (H) 09/14/2023    Non-ischemic cardiomyopathy (H) 09/14/2023    Obese     SAM (obstructive sleep apnea) 08/27/2023    Sleep apnea     Past Surgical History:   Procedure " Laterality Date    ADENOIDECTOMY      TONSILLECTOMY      Family History   Problem Relation Age of Onset    Diabetes Mother     Thyroid Disease Mother     Heart Disease Mother     Mental Illness Mother     Diabetes Maternal Grandmother     Heart Disease Maternal Grandmother     Bipolar Disorder Sister     Social History     Socioeconomic History    Marital status: Single     Spouse name: Not on file    Number of children: 1    Years of education: HS    Highest education level: Not on file   Occupational History    Not on file   Tobacco Use    Smoking status: Former     Packs/day: 1.00     Types: Cigarettes     Quit date: 1/1/2020     Years since quitting: 3.7    Smokeless tobacco: Never    Tobacco comments:     Decreased from 2 ppd to 1 ppd since May 2014   Substance and Sexual Activity    Alcohol use: No    Drug use: No    Sexual activity: Not Currently   Other Topics Concern    Not on file   Social History Narrative    Lives on her own.     Social Determinants of Health     Financial Resource Strain: Not on file   Food Insecurity: Not on file   Transportation Needs: Not on file   Physical Activity: Not on file   Stress: Not on file   Social Connections: Not on file   Intimate Partner Violence: Not on file   Housing Stability: Not on file          Medications  Allergies   Current Outpatient Medications   Medication Sig Dispense Refill    albuterol (PROAIR HFA/PROVENTIL HFA/VENTOLIN HFA) 108 (90 Base) MCG/ACT inhaler Inhale 2 puffs into the lungs every 6 hours 18 g 11    albuterol (PROVENTIL) (2.5 MG/3ML) 0.083% neb solution Take 1 vial (2.5 mg) by nebulization every 6 hours as needed for shortness of breath or wheezing 90 mL 3    benztropine (COGENTIN) 0.5 MG tablet Take 0.5 mg by mouth At Bedtime      bumetanide (BUMEX) 1 MG tablet Take 1 tablet (1 mg) by mouth daily 30 tablet 1    DULoxetine (CYMBALTA) 30 MG capsule Take 60 mg by mouth daily      empagliflozin (JARDIANCE) 10 MG TABS tablet Take 1 tablet (10 mg) by  mouth daily 90 tablet 1    fluticasone-vilanterol (BREO ELLIPTA) 200-25 MCG/ACT inhaler Inhale 1 puff into the lungs daily 60 each 11    gabapentin (NEURONTIN) 100 MG capsule Take 100 mg by mouth 2 times daily      gabapentin (NEURONTIN) 400 MG capsule Take 400 mg by mouth At Bedtime      methylcellulose (CITRUCEL) 500 MG TABS tablet Take 500 mg by mouth daily      metoprolol succinate ER (TOPROL XL) 25 MG 24 hr tablet Take 1 tablet (25 mg) by mouth daily 90 tablet 1    nicotine (NICORETTE) 4 MG lozenge Place 4 mg inside cheek as needed for smoking cessation      QUEtiapine (SEROQUEL) 100 MG tablet Take 150 mg by mouth At Bedtime      sacubitril-valsartan (ENTRESTO) 24-26 MG per tablet Take 1 tablet by mouth 2 times daily 180 tablet 1    umeclidinium (INCRUSE ELLIPTA) 62.5 MCG/ACT inhaler Inhale 1 puff into the lungs daily 30 each 11    cholecalciferol (VITAMIN D3) 125 mcg (5000 units) capsule Take 125 mcg by mouth daily (Patient not taking: Reported on 9/14/2023)      Allergies   Allergen Reactions    Amoxicillin Unknown     As kid had mold test and told allergic     Mold [Molds & Smuts] Unknown    Mold/Mildew [Molds & Smuts] Unknown    Other Environmental Allergy Unknown     DUST    Penicillins Unknown     As kid had mold test and told allergic     Pollen [Pollen Extract] Unknown         Lab Results    Chemistry/lipid CBC Cardiac Enzymes/BNP/TSH/INR   Lab Results   Component Value Date    CHOL 154 09/08/2023    HDL 28 (L) 09/08/2023    TRIG 166 (H) 09/08/2023    BUN 15.2 09/08/2023     09/08/2023    CO2 24 09/08/2023    Lab Results   Component Value Date    WBC 7.7 08/27/2023    HGB 12.2 08/27/2023    HCT 39.6 08/27/2023    MCV 91 08/27/2023     09/01/2023    Lab Results   Component Value Date    TROPONINI 0.02 01/01/2020    BNP 16 01/01/2020    TSH 0.45 08/27/2023             This note has been dictated using voice recognition software. Any grammatical, typographical, or context distortions are  unintentional and inherent to the software    45 minutes spent on the date of encounter doing chart review, review of outside records, review of test results, interpretation with above tests, patient visit, documentation, and discussion with family.

## 2023-09-14 NOTE — LETTER
9/14/2023    Reymundo Avila MD  [address] [place]  [place] [address]    RE: Eva Parikh       Dear Colleague,     I had the pleasure of seeing Eva Parikh in the [facility].        Assessment/Recommendations   Assessment:    1. Nonischemic cardiomyopathy, heart failure with reduced ejection fraction, ejection fraction 15-20%, NYHA class III: Compensated.  She states her dyspnea exertion and lower extremity edema have improved.  Her weights have been stable since discharge.  She is trying to follow a low-sodium diet.  She met with the nurse clinician for further education.  2.  SAM: Not currently wearing CPAP due to her being broken. She is working on obtaining a new one.  Discussed the correlation between untreated sleep apnea and cardiomyopathy.  3.  Hypotension: Blood pressure 82/68.  She does have occasional lightheadedness.  She will try to work on increasing fluid intake.  Medication changes noted below  4.  Obesity: BMI 38.62    Plan:  1.   Heart failure medications:  - Beta blocker therapy with carvedilol 3.125 mg twice a day discontinued start metoprolol succinate 25 mg daily  - ARNI therapy with Entresto 24-26 mg twice a day  - SGLT2 inhibitor with Jardiance 10 mg daily  - Diuretic therapy with bumetanide 1 mg daily  2.  Continue monitoring daily weights and following a low-sodium diet  3.  Obtain a new CPAP as soon as possible  4.  Encouraged regular activity    Eva Parikh will follow up with Dr. Vázquez in 2 months and in the heart failure clinic in 3 weeks.     History of Present Illness/Subjective    Ms. Eva Parikh is a 53 year old female seen at [facility] heart failure clinic today for continued follow-up.  Her son Jakub accompanies her today.  She follows up for heart failure with reduced ejection fraction, nonischemic cardiomyopathy.  She was hospitalized August 26 to September 1 with new heart failure.  She had symptoms of  lower extremity edema and shortness of breath for about a month.  She was diuresed and symptoms improved.  She had an echocardiogram which showed an ejection fraction of 15 to 20% with moderate mitral regurgitation.  She had a stress cardiac MRI which was negative for inducible myocardial ischemia or infarction.  She was started on appropriate medications at discharge.  She has a past medical history significant for SAM not on CPAP due to hers being broken, COPD, asthma, obesity, anxiety.    Today, she states her dyspnea on exertion and lower extremity edema have improved.  She has occasional lightheadedness.  She denies shortness of breath, orthopnea, PND, palpitations, chest pain, abdominal fullness/bloating, and lower extremity edema.      She is monitoring home weights which are stable between 222-225 pounds.  She is following a low sodium diet.       ECHOCARDIOGRAM: 8/27/2023  Interpretation Summary     1. Left ventricular function is decreased. The ejection fraction is 15-20%  (severely reduced). There is severe global hypokinesia of the left ventricle.  2. The right ventricle is normal size. Mildly decreased right ventricular  systolic function  3. The left atrium is moderately dilated.  4. There is moderate (2+) mitral regurgitation. The mitral regurgitant jet is  eccentrically directed.  5. High RA pressure estimated at 15 mmHg or greater.       Stress cardiac MRI: 8/31/2023       FINAL IMPRESSION   ==========================================================================================================     1.  Pharmacological Regadenoson stress cardiac MRI is negative for inducible myocardial ischemia.  2.  Pharmacological Regadenoson stress ECG is negative for inducible myocardial ischemia.  3.  Severe left ventricular enlargement, normal wall thickness and severe global hypokinesis.  No left  ventricular thrombus formation.  The calculated left ventricular ejection fraction is 19%.  T1 mapping  value is  "1123 ms (normal reference less than 1000 ms).    4.  Significant left ventricular trabeculation with ratio of noncompaction over compaction more than 2.3.  This finding indicates noncompaction cardiomyopathy (if no other etiology of cardiomyopathy).  5.  No previous myocardial infarction, scar or other infiltrative process.  There are delayed gadolinium  enhancement in right ventricular insertion.  6.  Normal right ventricular size with mildly reduced right ventricular systolic function.  7.  Dilated left atrium.  8.  No significant valvular disease.  9.  Trace pericardial effusion.        Physical Examination Review of Systems   BP (!) 82/60   Pulse 88   Resp 14   Ht 1.626 m (5' 4\")   Wt 102.1 kg (225 lb)   BMI 38.62 kg/m    Body mass index is 38.62 kg/m .  Wt Readings from Last 3 Encounters:   09/14/23 102.1 kg (225 lb)   09/13/23 102.4 kg (225 lb 12.8 oz)   09/01/23 106.3 kg (234 lb 4.8 oz)       General Appearance:   no acute distress   ENT/Mouth: No abnormalities   EYES:  no scleral icterus, normal conjunctivae   Neck: no thyromegaly   Chest/Lungs:   lungs are decreased to auscultation, no rales or wheezing, equal chest wall expansion    Cardiovascular:   Regular. Normal first and second heart sounds with no murmurs, rubs, or gallops, no edema bilaterally    Abdomen:  Obese, bowel sounds are present   Extremities: no cyanosis or clubbing   Skin: warm   Neurologic: no tremors     Psychiatric: alert and oriented x3    Enc Vitals  BP: (!) 82/60  Pulse: 88  Resp: 14  Weight: 102.1 kg (225 lb) (With shoes.)  Height: 162.6 cm (5' 4\")                                         Medical History  Surgical History Family History Social History   Past Medical History:   Diagnosis Date    Anxiety     Arthritis     Asthma     Class 2 obesity due to excess calories with body mass index (BMI) of 38.0 to 38.9 in adult 09/14/2023    Congestive heart failure (H)     COPD (chronic obstructive pulmonary disease) (H)     " Depression     Heart failure with reduced ejection fraction (H) 09/14/2023    Non-ischemic cardiomyopathy (H) 09/14/2023    Obese     SAM (obstructive sleep apnea) 08/27/2023    Sleep apnea     Past Surgical History:   Procedure Laterality Date    ADENOIDECTOMY      TONSILLECTOMY      Family History   Problem Relation Age of Onset    Diabetes Mother     Thyroid Disease Mother     Heart Disease Mother     Mental Illness Mother     Diabetes Maternal Grandmother     Heart Disease Maternal Grandmother     Bipolar Disorder Sister     Social History     Socioeconomic History    Marital status: Single     Spouse name: Not on file    Number of children: 1    Years of education:     Highest education level: Not on file   Occupational History    Not on file   Tobacco Use    Smoking status: Former     Packs/day: 1.00     Types: Cigarettes     Quit date: 1/1/2020     Years since quitting: 3.7    Smokeless tobacco: Never    Tobacco comments:     Decreased from 2 ppd to 1 ppd since May 2014   Substance and Sexual Activity    Alcohol use: No    Drug use: No    Sexual activity: Not Currently   Other Topics Concern    Not on file   Social History Narrative    Lives on her own.     Social Determinants of Health     Financial Resource Strain: Not on file   Food Insecurity: Not on file   Transportation Needs: Not on file   Physical Activity: Not on file   Stress: Not on file   Social Connections: Not on file   Intimate Partner Violence: Not on file   Housing Stability: Not on file          Medications  Allergies   Current Outpatient Medications   Medication Sig Dispense Refill    albuterol (PROAIR HFA/PROVENTIL HFA/VENTOLIN HFA) 108 (90 Base) MCG/ACT inhaler Inhale 2 puffs into the lungs every 6 hours 18 g 11    albuterol (PROVENTIL) (2.5 MG/3ML) 0.083% neb solution Take 1 vial (2.5 mg) by nebulization every 6 hours as needed for shortness of breath or wheezing 90 mL 3    benztropine (COGENTIN) 0.5 MG tablet Take 0.5 mg by mouth At  Bedtime      bumetanide (BUMEX) 1 MG tablet Take 1 tablet (1 mg) by mouth daily 30 tablet 1    DULoxetine (CYMBALTA) 30 MG capsule Take 60 mg by mouth daily      empagliflozin (JARDIANCE) 10 MG TABS tablet Take 1 tablet (10 mg) by mouth daily 90 tablet 1    fluticasone-vilanterol (BREO ELLIPTA) 200-25 MCG/ACT inhaler Inhale 1 puff into the lungs daily 60 each 11    gabapentin (NEURONTIN) 100 MG capsule Take 100 mg by mouth 2 times daily      gabapentin (NEURONTIN) 400 MG capsule Take 400 mg by mouth At Bedtime      methylcellulose (CITRUCEL) 500 MG TABS tablet Take 500 mg by mouth daily      metoprolol succinate ER (TOPROL XL) 25 MG 24 hr tablet Take 1 tablet (25 mg) by mouth daily 90 tablet 1    nicotine (NICORETTE) 4 MG lozenge Place 4 mg inside cheek as needed for smoking cessation      QUEtiapine (SEROQUEL) 100 MG tablet Take 150 mg by mouth At Bedtime      sacubitril-valsartan (ENTRESTO) 24-26 MG per tablet Take 1 tablet by mouth 2 times daily 180 tablet 1    umeclidinium (INCRUSE ELLIPTA) 62.5 MCG/ACT inhaler Inhale 1 puff into the lungs daily 30 each 11    cholecalciferol (VITAMIN D3) 125 mcg (5000 units) capsule Take 125 mcg by mouth daily (Patient not taking: Reported on 9/14/2023)      Allergies   Allergen Reactions    Amoxicillin Unknown     As kid had mold test and told allergic     Mold [Molds & Smuts] Unknown    Mold/Mildew [Molds & Smuts] Unknown    Other Environmental Allergy Unknown     DUST    Penicillins Unknown     As kid had mold test and told allergic     Pollen [Pollen Extract] Unknown         Lab Results    Chemistry/lipid CBC Cardiac Enzymes/BNP/TSH/INR   Lab Results   Component Value Date    CHOL 154 09/08/2023    HDL 28 (L) 09/08/2023    TRIG 166 (H) 09/08/2023    BUN 15.2 09/08/2023     09/08/2023    CO2 24 09/08/2023    Lab Results   Component Value Date    WBC 7.7 08/27/2023    HGB 12.2 08/27/2023    HCT 39.6 08/27/2023    MCV 91 08/27/2023     09/01/2023    Lab Results    Component Value Date    TROPONINI 0.02 01/01/2020    BNP 16 01/01/2020    TSH 0.45 08/27/2023             This note has been dictated using voice recognition software. Any grammatical, typographical, or context distortions are unintentional and inherent to the software    45 minutes spent on the date of encounter doing chart review, review of outside records, review of test results, interpretation with above tests, patient visit, documentation, and discussion with family.                Thank you for allowing me to participate in the care of your patient.      Sincerely,     JUAN Valladares [facility]  cc:   Lauren Vázquez MD  [address]  [place] [address]

## 2023-09-14 NOTE — PATIENT INSTRUCTIONS
Eva Parikh,    It was a pleasure to see you today at [facility].     My recommendations after this visit include:  - Please follow up with Dr Vázquez in 2 months   - Please follow up with Katherine Pearce in 3 weeks  - Stop carvedilol   - Start metoprolol 25 mg daily. Take at night    Katherine Pearce, CNP

## 2023-09-14 NOTE — PROGRESS NOTES
[facility]: Heart Failure Care Coordination   Heart Failure Education    Situation/Background:      RN CC provided heart failure education to Pt and son during admission.    Assessment:      Living situation: home, independent    Barriers to Heart Failure follow-up: non     Medication management: independent      Intervention/Plan:      CM/HF education topics reviewed:  Low sodium: 2000 mg or less daily, meal choices and label reading   Fluid Restriction: 50-60oz   Daily weight monitoring and logging   Medication review and importance of compliance   Overview of C.O.R.E. clinic   Importance of exercise   Symptoms of HF to be reported to Core Team      Education materials provided:  Low sodium food and drink handout  Low sodium food product examples  Already prepared low salt meal delivery services handout  HF stoplight tool  C.O.R.E. information brochure     HF resources reviewed:  [employer]  Heart Failure support group  HRS      Instructed patient to call RN line with new or worsening heart failure symptoms and/or rapid weight gain.     Patient expressed understanding of above education/instructions and denied further questions at this time.    Son states Pt does not eat much but that they split meals if and when they go out. He tries to make sure she gets her fruits and veggies.    We discussed daily wts, sodium and fluid restriction, no salt seasonings, avoiding take out, more home cooked meals, [employer], and more.    Jada REICHOAdenikeRLIA. JERRELL  BSN

## 2023-09-17 PROBLEM — J96.11 CHRONIC RESPIRATORY FAILURE WITH HYPOXIA (H): Status: ACTIVE | Noted: 2023-08-26

## 2023-09-17 PROBLEM — K76.89 HEPATIC CYST: Status: ACTIVE | Noted: 2017-10-30

## 2023-09-17 PROBLEM — I50.9 NEW ONSET OF CONGESTIVE HEART FAILURE (H): Status: RESOLVED | Noted: 2023-08-26 | Resolved: 2023-09-17

## 2023-09-17 PROBLEM — K76.89 HEPATIC CYST: Status: RESOLVED | Noted: 2017-10-30 | Resolved: 2023-09-17

## 2023-09-17 PROBLEM — E66.01 MORBID OBESITY (H): Chronic | Status: ACTIVE | Noted: 2023-09-14

## 2023-09-17 PROBLEM — F33.1 MAJOR DEPRESSIVE DISORDER, RECURRENT EPISODE, MODERATE (H): Chronic | Status: ACTIVE | Noted: 2019-06-24

## 2023-09-17 PROBLEM — I50.20 HEART FAILURE WITH REDUCED EJECTION FRACTION (H): Chronic | Status: ACTIVE | Noted: 2023-09-14

## 2023-09-17 PROBLEM — I42.8 NON-ISCHEMIC CARDIOMYOPATHY (H): Chronic | Status: ACTIVE | Noted: 2023-09-14

## 2023-09-17 PROBLEM — J44.89 ASTHMA-CHRONIC OBSTRUCTIVE PULMONARY DISEASE OVERLAP SYNDROME (H): Chronic | Status: ACTIVE | Noted: 2023-08-27

## 2023-09-18 ENCOUNTER — VIRTUAL VISIT (OUTPATIENT)
Dept: SLEEP MEDICINE | Facility: CLINIC | Age: 53
End: 2023-09-18
Attending: NURSE PRACTITIONER
Payer: COMMERCIAL

## 2023-09-18 VITALS — BODY MASS INDEX: 37.56 KG/M2 | WEIGHT: 220 LBS | HEIGHT: 64 IN

## 2023-09-18 DIAGNOSIS — I50.20 HEART FAILURE WITH REDUCED EJECTION FRACTION (H): Chronic | ICD-10-CM

## 2023-09-18 DIAGNOSIS — G47.33 OSA (OBSTRUCTIVE SLEEP APNEA): Primary | ICD-10-CM

## 2023-09-18 DIAGNOSIS — J96.11 CHRONIC RESPIRATORY FAILURE WITH HYPOXIA (H): ICD-10-CM

## 2023-09-18 DIAGNOSIS — J44.89 ASTHMA-CHRONIC OBSTRUCTIVE PULMONARY DISEASE OVERLAP SYNDROME (H): Chronic | ICD-10-CM

## 2023-09-18 DIAGNOSIS — G47.00 INSOMNIA, UNSPECIFIED TYPE: ICD-10-CM

## 2023-09-18 DIAGNOSIS — E66.01 MORBID OBESITY (H): Chronic | ICD-10-CM

## 2023-09-18 DIAGNOSIS — F51.04 CHRONIC INSOMNIA: ICD-10-CM

## 2023-09-18 PROCEDURE — 99205 OFFICE O/P NEW HI 60 MIN: CPT | Mod: VID | Performed by: INTERNAL MEDICINE

## 2023-09-18 ASSESSMENT — SLEEP AND FATIGUE QUESTIONNAIRES
HOW LIKELY ARE YOU TO NOD OFF OR FALL ASLEEP IN A CAR, WHILE STOPPED FOR A FEW MINUTES IN TRAFFIC: WOULD NEVER DOZE
HOW LIKELY ARE YOU TO NOD OFF OR FALL ASLEEP WHILE SITTING AND TALKING TO SOMEONE: WOULD NEVER DOZE
HOW LIKELY ARE YOU TO NOD OFF OR FALL ASLEEP WHILE SITTING INACTIVE IN A PUBLIC PLACE: WOULD NEVER DOZE
HOW LIKELY ARE YOU TO NOD OFF OR FALL ASLEEP WHILE SITTING AND READING: WOULD NEVER DOZE
HOW LIKELY ARE YOU TO NOD OFF OR FALL ASLEEP WHILE LYING DOWN TO REST IN THE AFTERNOON WHEN CIRCUMSTANCES PERMIT: MODERATE CHANCE OF DOZING
HOW LIKELY ARE YOU TO NOD OFF OR FALL ASLEEP WHILE WATCHING TV: WOULD NEVER DOZE
HOW LIKELY ARE YOU TO NOD OFF OR FALL ASLEEP WHEN YOU ARE A PASSENGER IN A CAR FOR AN HOUR WITHOUT A BREAK: WOULD NEVER DOZE
HOW LIKELY ARE YOU TO NOD OFF OR FALL ASLEEP WHILE SITTING QUIETLY AFTER LUNCH WITHOUT ALCOHOL: WOULD NEVER DOZE

## 2023-09-18 ASSESSMENT — PAIN SCALES - GENERAL: PAINLEVEL: NO PAIN (0)

## 2023-09-18 NOTE — PROGRESS NOTES
Virtual Visit Details    Type of service:  Video Visit     Originating Location (pt. Location): Home    Distant Location (provider location):  Off-site  Platform used for Video Visit: Cristela

## 2023-09-18 NOTE — PATIENT INSTRUCTIONS
Your sleep apnea treatment may be affected by device recall    Our records show that you may have a [place] Respironics CPAP for the treatment of sleep apnea. Many of these devices have been recalled* by the  for replacement. [facility] recommends:     1) If you are using a Resmed device, continue using the device.  2) If you have a [place] Respironics device, register your device for confirmation of type of device and repair of the device at https://www.[employer]/healthcare/e/sleep/communications/src-update -if you cannot use link, call [phone].  The website will assist you in obtaining the serial number for registration.   3) If you are using a [place] Respironics CPAP or Bilevel PAP device and you do not have immediate breathing, driving or cardiovascular risks without the device, consider stopping use of the device after verification that is has been recalled. Discuss this decision with your medical provider if you are uncertain about your medical risks.  4) If you are not using Respironics CPAP but are using a Respironics advanced device for breathing support (AVAPS, ASV, Bilevel PAP), continue using the device and review 5 and 6 below).     5) If you continue the device, do not include ozone generating  connected to PAP devices.  6) Bacterial filters to reduce exposure to particulates are sometimes cumbersome to use and are not currently recommended by the .    ?       You may also choose discuss with your provider alternative approaches to treatment.      *[employer] is voluntarily recalling the below devices due to two (2) issues related to the polyester-based polyurethane (PE-PUR) sound abatement foam used in [place] Continuous and Non-Continuous Ventilators: 1) PE-PUR foam may degrade into particles which may enter the device's the air pathway and be ingested or inhaled by the user, and 2) the PE-PUR foam may off-gas certain chemicals.  The foam degradation may be exacerbated by use of unapproved cleaning methods, such as ozone (see FDA safety communication on use of Ozone ), and off-gassing may occur during initial operation and may possibly continue throughout the device's useful life.   These issues can result in serious injury which can be life-threatening, cause permanent impairment, and/or require medical intervention to preclude permanent impairment. To date, [facility] has received several complaints regarding the presence of black debris/particles within the airpath circuit (extending from the device outlet, humidifier, tubing, and mask). [facility] also has received reports of headache, upper airway irritation, cough, chest pressure and sinus infection. The potential risks of particulate exposure include: Irritation (skin, eye, and respiratory tract), inflammatory response, headache, asthma, adverse effects to other organs (e.g. kidneys and liver) and toxic carcinogenic affects. The potential risks of chemical exposure due to off-gassing include: headache/dizziness, irritation (eyes, nose, respiratory tract, skin), hypersensitivity, nausea/vomiting, toxic and carcinogenic effects. There have been no reports of death as a result of these issues.    Actions to be taken:  Discontinue the use of your device.  Do not continue to use ozone  with the device.     [place] affected devices on the recall website, www.Huddle.com/SRC-update    i. The website provides current information on the status of the recall and how  to receive permanent corrective action to address the two issues.    ii. The website also provides instructions on how to locate an affected device  Serial Number and will guide users through the registration process.    iii. In the [place], call [phone] Service Hotline if you cannot visit the website                Your BMI is Body mass index is 37.76 kg/m .    What is BMI?  Body mass index (BMI) is one way  to tell whether you are at a healthy weight, overweight, or obese. It measures your weight in relation to your height.  A BMI of 18.5 to 24.9 is in the healthy range. A person with a BMI of 25 to 29.9 is considered overweight, and someone with a BMI of 30 or greater is considered obese.  Another way to find out if you are at risk for health problems caused by overweight and obesity is to measure your waist. If you are a woman and your waist is more than 35 inches, or if you are a man and your waist is more than 40 inches, your risk of disease may be higher.  More than two-thirds of American adults are considered overweight or obese. Being overweight or obese increases the risk for further weight gain.  Excess weight may lead to heart disease and diabetes. Creating and following plans for healthy eating and physical activity may help you improve your health.    Methods for maintaining or losing weight.  Weight control is part of healthy lifestyle and includes exercise, emotional health, and healthy eating habits.  Careful eating habits lifelong is the mainstay of weight control.  Though there are significant health benefits from weight loss, long-term weight loss with diet alone may be very difficult to achieve- studies show long-term success with dietary management in less than 10% of people. Attaining a healthy weight may be especially difficult to achieve in those with severe obesity. In some cases, medications, devices and surgical management might be considered.    What can you do?  If you are overweight or obese and are interested in methods for weight loss, you should discuss this with your provider. In addition, we recommend that you review healthy life styles and methods for weight loss available through the National Institutes of Health patient information sites:   http://win.niddk.nih.gov/publications/index.htm

## 2023-09-18 NOTE — PROGRESS NOTES
Outpatient Sleep Medicine Consultation:      Name: Eva Parikh MRN# [id number]   Age: 53 year old YOB: 1970     Date of Consultation: September 18, 2023  Consultation is requested by: JUAN Mccracken CNP  [address]  [place] [address] Dianna Carrasquillo  Primary care provider: Reymundo Avila       Reason for Sleep Consult:     Eva Parikh is sent by Dianna Carrasquillo for a sleep consultation regarding obstructive sleep apnea .    Chief Complaint   Patient presents with    Consult      Sent by doctor because of history of obstructive sleep apnea and heart and lung disease           Assessment and Plan:     Reported history of obstructive sleep apnea by sleep study 2017  Currently untreated due to machine malfunction and recall  Minimal symptoms except fatigue (ESS 2), sleep maintenance difficulties   Comorbid COPD. congestive heart failure with reduced ejection fraction   - See patient instructions regarding recall information  - Get old study from '[facility] sleep'   - Recommend polysomnogram (using 4% desaturation/Medicare/ AASM 1B scoring rules) with transcutaneous C02 monitoring. Usual sleep meds. Likely will need later sleep times due to delayed sleep phase. Start study off of O2. Patient is a poor candidate for Home Sleep Testing due to moderate to severe pulmonary disease (with history of nocturnal oxygen use), congestive heart failure with possible central sleep apnea/Cheyne-Huber respirations   - VBG before hand    Sleep onset, maintenance difficulties (FIORDALIZA 14)  Managed with seroquel. Suspect psychophysiologic insomnia comorbid to generalized anxiety disorder and depression and complicated by delayed sleep phase, sleep disordered breathing, poor sleep hygiene and inappropriate caffeine use. We discussed sleep hygiene,  later bed times, and regular wake times.     Obesity  Weight increased since 2017 per patient, unsure how much   - See patient instructions           I spent 40 minutes with patient including counseling, and 25 minutes with chart review, and documentation     CC: Dianna Carrasquillo          History of Present Illness:     She is a poor historian at times    DME ?[facility] (Dr. Rodriguez)    Patient reports they are unsure why they were initially tested, but thinks maybe it was 'mainly due to her lungs' though she was also tired during the day.     Sleep study 2017 at ?[facility] (Dr. Rodriguez)  Not available for review  She was told she had bad sleep apnea    She was prescribed CPAP '20' which was too much and later changed to '15'    She wore CPAP regularly. Sometimes she felt like she could not breathe with it on.     Overnight oximetery (3/1/20) on CPAP through pulmonologist   JORGE 18.5. Lowest O2 73 %, Sao2 <88% for 233 minutes    Home O2 1 LPM with exertion, not using it. She says she is also supposed to be using it at night.     She stopped using CPAP when she heard about the recall, her machine also stopped working      SLEEP-WAKE SCHEDULE:     Work/School Days:    Usually gets into bed at  11  Has trouble falling asleep   3-4 nights per week with sleeping pills (seroquel)  She lays there. She has an over-active mind about 1/2 the time.   Wakes up in the middle of the night  1-2 times.  She has trouble falling back asleep  3 times a week.   Patient is usually up at   11  Uses alarm:   Yes, set at 10     Weekends/Non-work Days/All Other Days:  Usually gets into bed at   1 AM  Patient is usually up at  11    Patient states they do the following activities in bed:   TV usually, reads sometimes, no phone    Naps  Patient takes a purposeful nap   1-2 times a week at 2 PM and naps are usually  1-2 hours in duration  She dozes off unintentionally  0 days per week  Patient has had a driving accident or near-miss due to sleepiness/drowsiness:  No      SLEEP DISRUPTIONS:    Breathing/Snoring  Patient snores:  No idea, no bed partner    Movement:  Denies  restless leg syndrome symptoms         CAFFEINE AND OTHER SUBSTANCES:    Patient consumes caffeinated beverages per day:   6 sodas  Last caffeine use is usually:   bedtime      SCALES:    EPWORTH SLEEPINESS SCALE         9/18/2023    10:53 AM    [place] Sleepiness Scale ( TELLO Mcdonald  [id number]<br>ESS - [place]/English - Final version - 21 Nov 07 - [facility] [place].)   Sitting and reading Would never doze   Watching TV Would never doze   Sitting, inactive in a public place (e.g. a theatre or a meeting) Would never doze   As a passenger in a car for an hour without a break Would never doze   Lying down to rest in the afternoon when circumstances permit Moderate chance of dozing   Sitting and talking to someone Would never doze   Sitting quietly after a lunch without alcohol Would never doze   In a car, while stopped for a few minutes in traffic Would never doze   [place] Score (MC) 2   [place] Score (Sleep) 2         INSOMNIA SEVERITY INDEX (FIORDALIZA)          9/18/2023    10:55 AM   Insomnia Severity Index (FIORDALIZA)   Difficulty falling asleep 1   Difficulty staying asleep 1   Problems waking up too early 4   How SATISFIED/DISSATISFIED are you with your CURRENT sleep pattern? 3   How NOTICEABLE to others do you think your sleep problem is in terms of impairing the quality of your life? 1   How WORRIED/DISTRESSED are you about your current sleep problem? 2   To what extent do you consider your sleep problem to INTERFERE with your daily functioning (e.g. daytime fatigue, mood, ability to function at work/daily chores, concentration, memory, mood, etc.) CURRENTLY? 2   FIORDALIZA Total Score 14       Guidelines for Scoring/Interpretation:  Total score categories:  0-7 = No clinically significant insomnia   8-14 = Subthreshold insomnia   15-21 = Clinical insomnia (moderate severity)  22-28 = Clinical insomnia (severe)  Used via courtesy of www.[employer].va.gov with permission from Stephon Roque PhD., [employer]            Allergies:    Allergies   Allergen Reactions    Amoxicillin Unknown     As kid had mold test and told allergic     Mold [Molds & Smuts] Unknown    Mold/Mildew [Molds & Smuts] Unknown    Other Environmental Allergy Unknown     DUST    Penicillins Unknown     As kid had mold test and told allergic     Pollen [Pollen Extract] Unknown       Medications:    Current Outpatient Medications   Medication Sig Dispense Refill    albuterol (PROAIR HFA/PROVENTIL HFA/VENTOLIN HFA) 108 (90 Base) MCG/ACT inhaler Inhale 2 puffs into the lungs every 6 hours 18 g 11    albuterol (PROVENTIL) (2.5 MG/3ML) 0.083% neb solution Take 1 vial (2.5 mg) by nebulization every 6 hours as needed for shortness of breath or wheezing 90 mL 3    benztropine (COGENTIN) 0.5 MG tablet Take 0.5 mg by mouth At Bedtime      bumetanide (BUMEX) 1 MG tablet Take 1 tablet (1 mg) by mouth daily 30 tablet 1    cholecalciferol (VITAMIN D3) 125 mcg (5000 units) capsule Take 125 mcg by mouth daily (Patient not taking: Reported on 9/14/2023)      DULoxetine (CYMBALTA) 30 MG capsule Take 60 mg by mouth daily      empagliflozin (JARDIANCE) 10 MG TABS tablet Take 1 tablet (10 mg) by mouth daily 90 tablet 1    fluticasone-vilanterol (BREO ELLIPTA) 200-25 MCG/ACT inhaler Inhale 1 puff into the lungs daily 60 each 11    gabapentin (NEURONTIN) 100 MG capsule Take 100 mg by mouth 2 times daily      gabapentin (NEURONTIN) 400 MG capsule Take 400 mg by mouth At Bedtime      methylcellulose (CITRUCEL) 500 MG TABS tablet Take 500 mg by mouth daily      metoprolol succinate ER (TOPROL XL) 25 MG 24 hr tablet Take 1 tablet (25 mg) by mouth daily 90 tablet 1    nicotine (NICORETTE) 4 MG lozenge Place 4 mg inside cheek as needed for smoking cessation      QUEtiapine (SEROQUEL) 100 MG tablet Take 150 mg by mouth At Bedtime      sacubitril-valsartan (ENTRESTO) 24-26 MG per tablet Take 1 tablet by mouth 2 times daily 180 tablet 1    umeclidinium (INCRUSE ELLIPTA) 62.5  MCG/ACT inhaler Inhale 1 puff into the lungs daily 30 each 11       Problem List:  Patient Active Problem List    Diagnosis Date Noted    Heart failure with reduced ejection fraction (H) 09/14/2023     Priority: Medium     Nonischemic cardiomyopathy, heart failure with reduced ejection fraction, ejection fraction 15-20% on ecco 9/2023      Non-ischemic cardiomyopathy (H) 09/14/2023     Priority: Medium    Asthma-chronic obstructive pulmonary disease overlap syndrome (H) 08/27/2023     Priority: Medium     PFTS 2/2020 - FEV1- 1.18 (43%), ratio 0.58, 28% change with bronchodilators,  %, %, DLCO 61%       SAM (obstructive sleep apnea) 08/27/2023     Priority: Medium    Chronic respiratory failure with hypoxia (H) 08/26/2023     Priority: Medium     Home O2 1 LPM with exertion....      Major depressive disorder, recurrent episode, moderate (H) 06/24/2019     Priority: Medium    Generalized anxiety disorder 09/25/2014     Priority: Medium    Morbid obesity (H) 09/14/2023     Priority: Low    GERD (gastroesophageal reflux disease) 09/25/2014     Priority: Low    Insomnia, unspecified 09/25/2014     Priority: Low        Past Medical/Surgical History:  Past Medical History:   Diagnosis Date    Acute hypoxemic respiratory failure (H) 08/26/2023    Anxiety     Arthritis     Asthma     COPD (chronic obstructive pulmonary disease) (H)     Depression     Heart failure with reduced ejection fraction (H)     Hepatic cyst 10/30/2017    New onset of congestive heart failure (H) 08/26/2023    Non-ischemic cardiomyopathy (H)     Obese     SAM (obstructive sleep apnea)     Sleep apnea      Past Surgical History:   Procedure Laterality Date    ADENOIDECTOMY      TONSILLECTOMY         Social History:  Social History     Socioeconomic History    Marital status: Single     Spouse name: Not on file    Number of children: 1    Years of education: HS    Highest education level: Not on file   Occupational History    Not on file  "  Tobacco Use    Smoking status: Former     Packs/day: 1.00     Types: Cigarettes     Quit date: 1/1/2020     Years since quitting: 3.7    Smokeless tobacco: Never    Tobacco comments:     Decreased from 2 ppd to 1 ppd since May 2014   Substance and Sexual Activity    Alcohol use: No    Drug use: No    Sexual activity: Not Currently   Other Topics Concern    Not on file   Social History Narrative    Lives on her own.     Social Determinants of Health     Financial Resource Strain: Not on file   Food Insecurity: Not on file   Transportation Needs: Not on file   Physical Activity: Not on file   Stress: Not on file   Social Connections: Not on file   Intimate Partner Violence: Not on file   Housing Stability: Not on file       Family History:  Family History   Problem Relation Age of Onset    Diabetes Mother     Thyroid Disease Mother     Heart Disease Mother     Mental Illness Mother     Diabetes Maternal Grandmother     Heart Disease Maternal Grandmother     Bipolar Disorder Sister        Physical Examination:  Vitals: Ht 1.626 m (5' 4\")   Wt 99.8 kg (220 lb)   BMI 37.76 kg/m    BMI= Body mass index is 37.76 kg/m .      SpO2 Readings from Last 4 Encounters:   09/13/23 95%   09/01/23 90%   08/29/22 93%   03/05/21 92%       GENERAL APPEARANCE: alert and no distress  EYES: Eyes grossly normal to inspection  NECK: generous size  LUNGS: no shortness of breath , cough  NEURO: mentation intact, speech normal and cranial nerves 2-12 appear intact  PSYCH: affect normal/bright           Data: All pertinent previous laboratory data reviewed     Recent Labs   Lab Test 09/08/23  0903 09/01/23  0442    139   POTASSIUM 4.1 3.9   CHLORIDE 100 102   CO2 24 29   ANIONGAP 13 8   * 155*   BUN 15.2 13.7   CR 0.90 0.82   KANU 9.2 8.7       Recent Labs   Lab Test 09/01/23  0442 08/29/23  0431 08/27/23  0555   WBC  --   --  7.7   RBC  --   --  4.36   HGB  --   --  12.2   HCT  --   --  39.6   MCV  --   --  91   MCH  --   --  " 28.0   MCHC  --   --  30.8*   RDW  --   --  14.0      < > 163    < > = values in this interval not displayed.       Recent Labs   Lab Test 09/08/23  0903   PROTTOTAL 6.1*   ALBUMIN 4.0   BILITOTAL 0.5   ALKPHOS 72   AST 31   ALT 47       TSH   Date Value   08/27/2023 0.45 uIU/mL   07/10/2019 0.62 mU/L   05/09/2018 0.95 uIU/mL       Amphetamines Urine (no units)   Date Value   07/10/2019 Screen Negative     Barbiturates Urine (no units)   Date Value   07/10/2019 Screen Negative     Cannabinoids Urine (no units)   Date Value   07/10/2019 Screen Negative     Cocaine Urine (no units)   Date Value   07/10/2019 Screen Negative     Opiates Urine (no units)   Date Value   07/10/2019 Screen Negative     PCP Urine (no units)   Date Value   07/10/2019 Screen Negative         Chest CT:   CT Chest Pulmonary Embolism w Contrast 08/27/2023    Narrative  EXAM: CT CHEST PULMONARY EMBOLISM W CONTRAST  LOCATION: [facility]  DATE: 8/27/2023    INDICATION: Dyspnea, hypoxia.  COMPARISON: 01/02/2020.  TECHNIQUE: CT chest pulmonary angiogram during arterial phase injection of IV contrast. Multiplanar reformats and MIP reconstructions were performed. Dose reduction techniques were used.  CONTRAST: 90 ml Iso 370.    FINDINGS:  ANGIOGRAM CHEST: Allowing for some mild motion artifact in the lower lungs, nothing definite for pulmonary embolism. Enlargement of the central pulmonary arteries suggest pulmonary arterial hypertension. Thoracic aorta is not opacified well enough to  evaluate for dissection. Negative for aneurysm. No CT evidence of right heart strain.    LUNGS AND PLEURA: Numerous benign calcified granulomas in the lungs as well as multiple tiny noncalcified pulmonary nodules also likely related to granulomatous disease. Emphysematous changes in the lungs. Mild hazy groundglass opacities in the lower  lungs favored to be due to atelectasis or edema versus less likely infiltrate. Clinical correlation.  Scattered areas of subpleural linear scarring and/or atelectasis. Small right and tiny left pleural effusions.    MEDIASTINUM/AXILLAE: No adenopathy. Cardiac enlargement. Trace amount of pericardial fluid. Esophagus is grossly negative.    CORONARY ARTERY CALCIFICATION: None.    UPPER ABDOMEN: Multiple benign-appearing hepatic cysts.    MUSCULOSKELETAL: Normal.    Impression  IMPRESSION:  1.  Negative for pulmonary embolism. Enlargement of the central pulmonary arteries can be seen with pulmonary arterial hypertension.    2.  Cardiac enlargement with small right and tiny left pleural effusions. Correlation for any signs of CHF or fluid overload.    3.  Mild groundglass opacities in the lower lungs favored to be due to atelectasis or edema with infiltrate felt to be less likely. Clinical correlation.    4.  Emphysematous changes in the lungs.    5.  Multiple tiny calcified and noncalcified bilateral pulmonary nodules are most compatible with prior granulomatous disease and similar to previous.        Lefty Loaiza MD 9/18/2023

## 2023-09-18 NOTE — NURSING NOTE
Is the patient currently in the state of [place]? YES    Visit mode:VIDEO    If the visit is dropped, the patient can be reconnected by: VIDEO VISIT: Text to cell phone:   Telephone Information:   Mobile [phone]   Mobile [phone]       Will anyone else be joining the visit? NO  (If patient encounters technical issues they should call [phone])    How would you like to obtain your AVS? MyChart    Are changes needed to the allergy or medication list? Pt stated no changes to allergies and Pt stated no med changes    Reason for visit: Consult  Has patient had flu shot for current/most recent flu season? If so, when? No    Margie DOBBINS

## 2023-09-19 ENCOUNTER — TELEPHONE (OUTPATIENT)
Dept: SCHEDULING | Facility: CLINIC | Age: 53
End: 2023-09-19

## 2023-09-19 NOTE — TELEPHONE ENCOUNTER
General Call    Contacts         Type Contact Phone/Fax    09/19/2023 04:08 PM CDT Phone (Outgoing) Eva Parikh (Self) [phone] (M)          Reason for Call:  Pt needing sleep study schedule, shift worker noted, please contact pt.     What are your questions or concerns:  na    Date of last appointment with provider: na    Okay to leave a detailed message?: Yes at Cell number on file:    Telephone Information:   Mobile [phone]   Mobile [phone]

## 2023-10-04 NOTE — NURSING NOTE
"Phone call made to patient for scheduling sleep study, return visit, and [facility] scheduling contact number. Per patient \"I decided to see someone else. \"    Sleep study request faxed to [facility].     Yasmin Samuel [facility]    "

## 2023-10-05 ENCOUNTER — OFFICE VISIT (OUTPATIENT)
Dept: CARDIOLOGY | Facility: CLINIC | Age: 53
End: 2023-10-05
Payer: COMMERCIAL

## 2023-10-05 VITALS
WEIGHT: 219 LBS | HEART RATE: 80 BPM | RESPIRATION RATE: 16 BRPM | HEIGHT: 64 IN | BODY MASS INDEX: 37.39 KG/M2 | DIASTOLIC BLOOD PRESSURE: 58 MMHG | SYSTOLIC BLOOD PRESSURE: 90 MMHG

## 2023-10-05 DIAGNOSIS — E66.01 MORBID OBESITY (H): Chronic | ICD-10-CM

## 2023-10-05 DIAGNOSIS — I42.8 NON-ISCHEMIC CARDIOMYOPATHY (H): Chronic | ICD-10-CM

## 2023-10-05 DIAGNOSIS — I50.20 HEART FAILURE WITH REDUCED EJECTION FRACTION (H): Primary | Chronic | ICD-10-CM

## 2023-10-05 DIAGNOSIS — G47.33 OSA (OBSTRUCTIVE SLEEP APNEA): ICD-10-CM

## 2023-10-05 PROCEDURE — 99214 OFFICE O/P EST MOD 30 MIN: CPT | Performed by: NURSE PRACTITIONER

## 2023-10-05 RX ORDER — BUMETANIDE 0.5 MG/1
0.5 TABLET ORAL DAILY
Qty: 90 TABLET | Refills: 1 | Status: SHIPPED | OUTPATIENT
Start: 2023-10-05 | End: 2024-03-01

## 2023-10-05 NOTE — PATIENT INSTRUCTIONS
Eva Parikh,    It was a pleasure to see you today at [facility].     My recommendations after this visit include:  - Please follow up with Dr Vázquez November 14   - Please follow up with Katherine Pearce in 3 weeks  - Schedule echocardiogram late November  - Reduce bumex to 0.5 mg (1/2 tablet) daily, monitor for signs or symptoms of fluid retention  - my nurses # [phone]    Katherine Pearce, CNP

## 2023-10-05 NOTE — LETTER
10/5/2023    Reymundo Avila MD  [address] [place]  [place] [address]    RE: Eva Parikh       Dear Colleague,     I had the pleasure of seeing Eva Parikh in the [facility].        Assessment/Recommendations   Assessment:    1. Nonischemic cardiomyopathy, heart failure with reduced ejection fraction, ejection fraction 15-20%, NYHA class II: Compensated.  She states her dyspnea on exertion and fatigue have improved.  Her weight has decreased.  She is watching her sodium intake and exercising.  2.  Hypotension: Blood pressure 90/58.  She does complain of fatigue and occasional lightheadedness.  3.  SAM: Not currently wearing a CPAP due to hers being broken.  She has spoke with sleep medicine and is working on obtaining a new one.  4.  Obesity: BMI 37.59.  Has started exercising by walking    Plan:  1.   Heart failure medications:  - Beta blocker therapy with metoprolol succinate 25 mg daily  - ARNI therapy with Entresto 24-26 mg twice a day  - SGLT2 inhibitor with Jardiance 10 mg daily  - Diuretic therapy with bumetanide reduced to 0.5 mg daily due to hypotension  2.  Monitor for signs or symptoms of fluid retention with reducing Bumex  3.  Monitoring weights and following a low-sodium diet  4.  Ordered a blood pressure cuff to monitor blood pressures at home  5.  Schedule echocardiogram late November to reassess ejection fraction  6.  Continue exercise    Eva Parikh will follow up with Dr. Vázquez November 14 and in the heart failure clinic in 3 weeks.     History of Present Illness/Subjective    Ms. Eva Parikh is a 53 year old female seen at [facility] heart failure clinic today for continued follow-up.   Her son Jakub accompanies her today.  She follows up for heart failure with reduced ejection fraction, nonischemic cardiomyopathy.  She was hospitalized late August with new heart failure.  She had symptoms of lower extremity edema and  "shortness of breath for about a month. She had an echocardiogram which showed an ejection fraction of 15 to 20% with moderate mitral regurgitation.  She had a stress cardiac MRI which was negative for inducible myocardial ischemia or infarction. She has a past medical history significant for SAM not on CPAP due to hers being broken, COPD, asthma, obesity, anxiety.     Today, she states her dyspnea on exertion and energy have improved.  She does have occasional lightheadedness. she denies shortness of breath, orthopnea, PND, palpitations, chest pain, abdominal fullness/bloating, and lower extremity edema.      She is monitoring home weights which have decreased to 216 pounds.  She is following a low sodium diet.  She participates in regular physical activity including walking.       Physical Examination Review of Systems   BP 90/58 (BP Location: Left arm, Patient Position: Sitting, Cuff Size: Adult Large)   Pulse 80   Resp 16   Ht 1.626 m (5' 4\")   Wt 99.3 kg (219 lb)   BMI 37.59 kg/m    Body mass index is 37.59 kg/m .  Wt Readings from Last 3 Encounters:   10/05/23 99.3 kg (219 lb)   09/18/23 99.8 kg (220 lb)   09/14/23 102.1 kg (225 lb)       General Appearance:   no acute distress   ENT/Mouth: No abnormalities   EYES:  no scleral icterus, normal conjunctivae   Neck: no thyromegaly   Chest/Lungs:   lungs are clear to auscultation, no rales or wheezing, equal chest wall expansion    Cardiovascular:   Regular. Normal first and second heart sounds with no murmurs, rubs, or gallops, no edema bilaterally    Abdomen:  bowel sounds are present   Extremities: no cyanosis or clubbing   Skin: warm   Neurologic: no tremors     Psychiatric: alert and oriented x3    Enc Vitals  BP: 90/58  Pulse: 80  Resp: 16  Weight: 99.3 kg (219 lb)  Height: 162.6 cm (5' 4\")                                         Medical History  Surgical History Family History Social History   Past Medical History:   Diagnosis Date    Acute hypoxemic " respiratory failure (H) 08/26/2023    Anxiety     Arthritis     Asthma     COPD (chronic obstructive pulmonary disease) (H)     Depression     Heart failure with reduced ejection fraction (H)     Hepatic cyst 10/30/2017    New onset of congestive heart failure (H) 08/26/2023    Non-ischemic cardiomyopathy (H)     Obese     SAM (obstructive sleep apnea)     Sleep apnea     Past Surgical History:   Procedure Laterality Date    ADENOIDECTOMY      TONSILLECTOMY      Family History   Problem Relation Age of Onset    Diabetes Mother     Thyroid Disease Mother     Heart Disease Mother     Mental Illness Mother     Diabetes Maternal Grandmother     Heart Disease Maternal Grandmother     Bipolar Disorder Sister     Social History     Socioeconomic History    Marital status: Single     Spouse name: Not on file    Number of children: 1    Years of education: HS    Highest education level: Not on file   Occupational History    Not on file   Tobacco Use    Smoking status: Former     Packs/day: 1.00     Types: Cigarettes     Quit date: 1/1/2020     Years since quitting: 3.7    Smokeless tobacco: Never    Tobacco comments:     Decreased from 2 ppd to 1 ppd since May 2014   Substance and Sexual Activity    Alcohol use: No    Drug use: No    Sexual activity: Not Currently   Other Topics Concern    Not on file   Social History Narrative    Lives on her own.     Social Determinants of Health     Financial Resource Strain: Not on file   Food Insecurity: Not on file   Transportation Needs: Not on file   Physical Activity: Not on file   Stress: Not on file   Social Connections: Not on file   Interpersonal Safety: Not on file   Housing Stability: Not on file          Medications  Allergies   Current Outpatient Medications   Medication Sig Dispense Refill    albuterol (PROAIR HFA/PROVENTIL HFA/VENTOLIN HFA) 108 (90 Base) MCG/ACT inhaler Inhale 2 puffs into the lungs every 6 hours 18 g 11    albuterol (PROVENTIL) (2.5 MG/3ML) 0.083% neb  solution Take 1 vial (2.5 mg) by nebulization every 6 hours as needed for shortness of breath or wheezing 90 mL 3    benztropine (COGENTIN) 0.5 MG tablet Take 0.5 mg by mouth At Bedtime      bumetanide (BUMEX) 0.5 MG tablet Take 1 tablet (0.5 mg) by mouth daily 90 tablet 1    DULoxetine (CYMBALTA) 30 MG capsule Take 60 mg by mouth daily      empagliflozin (JARDIANCE) 10 MG TABS tablet Take 1 tablet (10 mg) by mouth daily 90 tablet 1    fluticasone-vilanterol (BREO ELLIPTA) 200-25 MCG/ACT inhaler Inhale 1 puff into the lungs daily 60 each 11    gabapentin (NEURONTIN) 100 MG capsule Take 100 mg by mouth 2 times daily      gabapentin (NEURONTIN) 400 MG capsule Take 400 mg by mouth At Bedtime      methylcellulose (CITRUCEL) 500 MG TABS tablet Take 500 mg by mouth daily      metoprolol succinate ER (TOPROL XL) 25 MG 24 hr tablet Take 1 tablet (25 mg) by mouth daily 90 tablet 1    nicotine (NICORETTE) 4 MG lozenge Place 4 mg inside cheek as needed for smoking cessation      QUEtiapine (SEROQUEL) 100 MG tablet Take 150 mg by mouth At Bedtime      sacubitril-valsartan (ENTRESTO) 24-26 MG per tablet Take 1 tablet by mouth 2 times daily 180 tablet 1    umeclidinium (INCRUSE ELLIPTA) 62.5 MCG/ACT inhaler Inhale 1 puff into the lungs daily 30 each 11    cholecalciferol (VITAMIN D3) 125 mcg (5000 units) capsule Take 125 mcg by mouth daily (Patient not taking: Reported on 9/14/2023)      Allergies   Allergen Reactions    Amoxicillin Unknown     As kid had mold test and told allergic     Mold [Molds & Smuts] Unknown    Mold/Mildew [Molds & Smuts] Unknown    Other Environmental Allergy Unknown     DUST    Penicillins Unknown     As kid had mold test and told allergic     Pollen [Pollen Extract] Unknown         Lab Results    Chemistry/lipid CBC Cardiac Enzymes/BNP/TSH/INR   Lab Results   Component Value Date    CHOL 154 09/08/2023    HDL 28 (L) 09/08/2023    TRIG 166 (H) 09/08/2023    BUN 15.2 09/08/2023     09/08/2023    CO2  24 09/08/2023    Lab Results   Component Value Date    WBC 7.7 08/27/2023    HGB 12.2 08/27/2023    HCT 39.6 08/27/2023    MCV 91 08/27/2023     09/01/2023    Lab Results   Component Value Date    TROPONINI 0.02 01/01/2020    BNP 16 01/01/2020    TSH 0.45 08/27/2023             This note has been dictated using voice recognition software. Any grammatical, typographical, or context distortions are unintentional and inherent to the software    30 minutes spent on the date of encounter doing chart review, review of outside records, review of test results, interpretation with above tests, patient visit, documentation, and discussion with family.                Thank you for allowing me to participate in the care of your patient.      Sincerely,     JUAN Valladares [facility]  cc:   No referring provider defined for this encounter.

## 2023-10-05 NOTE — PROGRESS NOTES
Assessment/Recommendations   Assessment:    1. Nonischemic cardiomyopathy, heart failure with reduced ejection fraction, ejection fraction 15-20%, NYHA class II: Compensated.  She states her dyspnea on exertion and fatigue have improved.  Her weight has decreased.  She is watching her sodium intake and exercising.  2.  Hypotension: Blood pressure 90/58.  She does complain of fatigue and occasional lightheadedness.  3.  SAM: Not currently wearing a CPAP due to hers being broken.  She has spoke with sleep medicine and is working on obtaining a new one.  4.  Obesity: BMI 37.59.  Has started exercising by walking    Plan:  1.   Heart failure medications:  - Beta blocker therapy with metoprolol succinate 25 mg daily  - ARNI therapy with Entresto 24-26 mg twice a day  - SGLT2 inhibitor with Jardiance 10 mg daily  - Diuretic therapy with bumetanide reduced to 0.5 mg daily due to hypotension  2.  Monitor for signs or symptoms of fluid retention with reducing Bumex  3.  Monitoring weights and following a low-sodium diet  4.  Ordered a blood pressure cuff to monitor blood pressures at home  5.  Schedule echocardiogram late November to reassess ejection fraction  6.  Continue exercise    Eva Parikh will follow up with Dr. Vázquez November 14 and in the heart failure clinic in 3 weeks.     History of Present Illness/Subjective    Ms. Eva Parikh is a 53 year old female seen at [facility] heart failure clinic today for continued follow-up.   Her son Jakub accompanies her today.  She follows up for heart failure with reduced ejection fraction, nonischemic cardiomyopathy.  She was hospitalized late August with new heart failure.  She had symptoms of lower extremity edema and shortness of breath for about a month. She had an echocardiogram which showed an ejection fraction of 15 to 20% with moderate mitral regurgitation.  She had a stress cardiac MRI which was negative for inducible myocardial ischemia  "or infarction. She has a past medical history significant for SAM not on CPAP due to hers being broken, COPD, asthma, obesity, anxiety.     Today, she states her dyspnea on exertion and energy have improved.  She does have occasional lightheadedness. she denies shortness of breath, orthopnea, PND, palpitations, chest pain, abdominal fullness/bloating, and lower extremity edema.      She is monitoring home weights which have decreased to 216 pounds.  She is following a low sodium diet.  She participates in regular physical activity including walking.       Physical Examination Review of Systems   BP 90/58 (BP Location: Left arm, Patient Position: Sitting, Cuff Size: Adult Large)   Pulse 80   Resp 16   Ht 1.626 m (5' 4\")   Wt 99.3 kg (219 lb)   BMI 37.59 kg/m    Body mass index is 37.59 kg/m .  Wt Readings from Last 3 Encounters:   10/05/23 99.3 kg (219 lb)   09/18/23 99.8 kg (220 lb)   09/14/23 102.1 kg (225 lb)       General Appearance:   no acute distress   ENT/Mouth: No abnormalities   EYES:  no scleral icterus, normal conjunctivae   Neck: no thyromegaly   Chest/Lungs:   lungs are clear to auscultation, no rales or wheezing, equal chest wall expansion    Cardiovascular:   Regular. Normal first and second heart sounds with no murmurs, rubs, or gallops, no edema bilaterally    Abdomen:  bowel sounds are present   Extremities: no cyanosis or clubbing   Skin: warm   Neurologic: no tremors     Psychiatric: alert and oriented x3    Enc Vitals  BP: 90/58  Pulse: 80  Resp: 16  Weight: 99.3 kg (219 lb)  Height: 162.6 cm (5' 4\")                                         Medical History  Surgical History Family History Social History   Past Medical History:   Diagnosis Date    Acute hypoxemic respiratory failure (H) 08/26/2023    Anxiety     Arthritis     Asthma     COPD (chronic obstructive pulmonary disease) (H)     Depression     Heart failure with reduced ejection fraction (H)     Hepatic cyst 10/30/2017    New onset " of congestive heart failure (H) 08/26/2023    Non-ischemic cardiomyopathy (H)     Obese     SAM (obstructive sleep apnea)     Sleep apnea     Past Surgical History:   Procedure Laterality Date    ADENOIDECTOMY      TONSILLECTOMY      Family History   Problem Relation Age of Onset    Diabetes Mother     Thyroid Disease Mother     Heart Disease Mother     Mental Illness Mother     Diabetes Maternal Grandmother     Heart Disease Maternal Grandmother     Bipolar Disorder Sister     Social History     Socioeconomic History    Marital status: Single     Spouse name: Not on file    Number of children: 1    Years of education:     Highest education level: Not on file   Occupational History    Not on file   Tobacco Use    Smoking status: Former     Packs/day: 1.00     Types: Cigarettes     Quit date: 1/1/2020     Years since quitting: 3.7    Smokeless tobacco: Never    Tobacco comments:     Decreased from 2 ppd to 1 ppd since May 2014   Substance and Sexual Activity    Alcohol use: No    Drug use: No    Sexual activity: Not Currently   Other Topics Concern    Not on file   Social History Narrative    Lives on her own.     Social Determinants of Health     Financial Resource Strain: Not on file   Food Insecurity: Not on file   Transportation Needs: Not on file   Physical Activity: Not on file   Stress: Not on file   Social Connections: Not on file   Interpersonal Safety: Not on file   Housing Stability: Not on file          Medications  Allergies   Current Outpatient Medications   Medication Sig Dispense Refill    albuterol (PROAIR HFA/PROVENTIL HFA/VENTOLIN HFA) 108 (90 Base) MCG/ACT inhaler Inhale 2 puffs into the lungs every 6 hours 18 g 11    albuterol (PROVENTIL) (2.5 MG/3ML) 0.083% neb solution Take 1 vial (2.5 mg) by nebulization every 6 hours as needed for shortness of breath or wheezing 90 mL 3    benztropine (COGENTIN) 0.5 MG tablet Take 0.5 mg by mouth At Bedtime      bumetanide (BUMEX) 0.5 MG tablet Take 1  tablet (0.5 mg) by mouth daily 90 tablet 1    DULoxetine (CYMBALTA) 30 MG capsule Take 60 mg by mouth daily      empagliflozin (JARDIANCE) 10 MG TABS tablet Take 1 tablet (10 mg) by mouth daily 90 tablet 1    fluticasone-vilanterol (BREO ELLIPTA) 200-25 MCG/ACT inhaler Inhale 1 puff into the lungs daily 60 each 11    gabapentin (NEURONTIN) 100 MG capsule Take 100 mg by mouth 2 times daily      gabapentin (NEURONTIN) 400 MG capsule Take 400 mg by mouth At Bedtime      methylcellulose (CITRUCEL) 500 MG TABS tablet Take 500 mg by mouth daily      metoprolol succinate ER (TOPROL XL) 25 MG 24 hr tablet Take 1 tablet (25 mg) by mouth daily 90 tablet 1    nicotine (NICORETTE) 4 MG lozenge Place 4 mg inside cheek as needed for smoking cessation      QUEtiapine (SEROQUEL) 100 MG tablet Take 150 mg by mouth At Bedtime      sacubitril-valsartan (ENTRESTO) 24-26 MG per tablet Take 1 tablet by mouth 2 times daily 180 tablet 1    umeclidinium (INCRUSE ELLIPTA) 62.5 MCG/ACT inhaler Inhale 1 puff into the lungs daily 30 each 11    cholecalciferol (VITAMIN D3) 125 mcg (5000 units) capsule Take 125 mcg by mouth daily (Patient not taking: Reported on 9/14/2023)      Allergies   Allergen Reactions    Amoxicillin Unknown     As kid had mold test and told allergic     Mold [Molds & Smuts] Unknown    Mold/Mildew [Molds & Smuts] Unknown    Other Environmental Allergy Unknown     DUST    Penicillins Unknown     As kid had mold test and told allergic     Pollen [Pollen Extract] Unknown         Lab Results    Chemistry/lipid CBC Cardiac Enzymes/BNP/TSH/INR   Lab Results   Component Value Date    CHOL 154 09/08/2023    HDL 28 (L) 09/08/2023    TRIG 166 (H) 09/08/2023    BUN 15.2 09/08/2023     09/08/2023    CO2 24 09/08/2023    Lab Results   Component Value Date    WBC 7.7 08/27/2023    HGB 12.2 08/27/2023    HCT 39.6 08/27/2023    MCV 91 08/27/2023     09/01/2023    Lab Results   Component Value Date    TROPONINI 0.02 01/01/2020     BNP 16 01/01/2020    TSH 0.45 08/27/2023             This note has been dictated using voice recognition software. Any grammatical, typographical, or context distortions are unintentional and inherent to the software    30 minutes spent on the date of encounter doing chart review, review of outside records, review of test results, interpretation with above tests, patient visit, documentation, and discussion with family.

## 2023-10-26 ENCOUNTER — OFFICE VISIT (OUTPATIENT)
Dept: CARDIOLOGY | Facility: CLINIC | Age: 53
End: 2023-10-26
Payer: COMMERCIAL

## 2023-10-26 VITALS
HEART RATE: 84 BPM | BODY MASS INDEX: 37.52 KG/M2 | RESPIRATION RATE: 20 BRPM | OXYGEN SATURATION: 95 % | DIASTOLIC BLOOD PRESSURE: 70 MMHG | SYSTOLIC BLOOD PRESSURE: 100 MMHG | WEIGHT: 218.6 LBS

## 2023-10-26 DIAGNOSIS — E66.01 MORBID OBESITY (H): Chronic | ICD-10-CM

## 2023-10-26 DIAGNOSIS — I42.8 NON-ISCHEMIC CARDIOMYOPATHY (H): Chronic | ICD-10-CM

## 2023-10-26 DIAGNOSIS — I95.9 HYPOTENSION, UNSPECIFIED HYPOTENSION TYPE: ICD-10-CM

## 2023-10-26 DIAGNOSIS — I50.20 HEART FAILURE WITH REDUCED EJECTION FRACTION (H): Primary | Chronic | ICD-10-CM

## 2023-10-26 DIAGNOSIS — G47.33 OSA (OBSTRUCTIVE SLEEP APNEA): ICD-10-CM

## 2023-10-26 PROCEDURE — 99214 OFFICE O/P EST MOD 30 MIN: CPT | Performed by: NURSE PRACTITIONER

## 2023-10-26 NOTE — PATIENT INSTRUCTIONS
Eva Parikh,    It was a pleasure to see you today at [facility].     My recommendations after this visit include:  - Please follow up with Dr Vázquez November 14   - Please follow up with Katherine Pearce in mid December  - Echocardiogram scheduled November 20  - Continue current medications  - Give sleep medicine a call to obtain a new CPAP    Katherine Pearce, CNP

## 2023-10-26 NOTE — LETTER
10/26/2023    Reymundo Avila MD  [address] [place]  [place] [address]    RE: Eva Parikh       Dear Colleague,     I had the pleasure of seeing Eva Parikh in the [facility].        Assessment/Recommendations   Assessment:    1. Nonischemic cardiomyopathy, heart failure with reduced ejection fraction, ejection fraction 15-20%, NYHA class II: Compensated.  She has fatigue.  Her weight has been stable.  She is scheduled for echocardiogram in November to reassess heart function.  We discussed if LVEF remains less than 35% will recommend ICD.  2.  Hypotension: Blood pressure 100/70.  She has occasional lightheadedness  3.  SAM: Her CPAP has been broken.  She needs to contact sleep medicine to obtain a new 1  4.  Obesity: BMI 37.52.  She is working on weight loss by exercising and watching her diet    Plan:  1.   Heart failure medications:  - Beta blocker therapy with metoprolol succinate 25 mg daily  - ARNI therapy with Entresto 24-26 mg twice a day  - SGLT2 inhibitor with Jardiance 10 mg daily  - Diuretic therapy with bumetanide 0.5 mg daily  2. Echocardiogram scheduled November 20  3.  Continue current medications.  Unable to titrate due to hypotension  4.  Continue monitoring daily weights and following a low-salt diet  5.  Continue regular exercise    Eva Parikh will follow up with Dr Vázquez November 14 and in the heart failure clinic in mid December.     History of Present Illness/Subjective    Ms. Eva Parikh is a 53 year old female seen at [facility] heart failure clinic today for continued follow-up. Her son Jakub accompanies her today.  She follows up for heart failure with reduced ejection fraction, nonischemic cardiomyopathy.  She was hospitalized late August with new heart failure.  She had symptoms of lower extremity edema and shortness of breath for about a month. She had an echocardiogram which showed an ejection fraction of 15 to 20%  with moderate mitral regurgitation.  She had a stress cardiac MRI which was negative for inducible myocardial ischemia or infarction. She has a past medical history significant for SAM not on CPAP due to hers being broken, COPD, asthma, obesity, anxiety.       During the last clinic visit, I decreased her bumex due to hypotension. She has not noticed any signs or symptoms of fluid retention. She has fatigue.  She denies shortness of breath, dyspnea on exertion, orthopnea, PND, palpitations, chest pain, abdominal fullness/bloating, and lower extremity edema.      She is monitoring home weights which are stable around 215 pounds.  She is following a low sodium diet.  She participates in regular physical activity including walking.       Physical Examination Review of Systems   /70   Pulse 84   Resp 20   Wt 99.2 kg (218 lb 9.6 oz)   SpO2 95%   BMI 37.52 kg/m    Body mass index is 37.52 kg/m .  Wt Readings from Last 3 Encounters:   10/26/23 99.2 kg (218 lb 9.6 oz)   10/05/23 99.3 kg (219 lb)   09/18/23 99.8 kg (220 lb)       General Appearance:   no acute distress   ENT/Mouth: No abnormalities   EYES:  no scleral icterus, normal conjunctivae   Neck: no thyromegaly   Chest/Lungs:   lungs are clear to auscultation, no rales or wheezing, equal chest wall expansion    Cardiovascular:   Regular. Normal first and second heart sounds with no murmurs, rubs, or gallops, no edema bilaterally    Abdomen:  bowel sounds are present   Extremities: no cyanosis or clubbing   Skin: warm   Neurologic: no tremors     Psychiatric: alert and oriented x3    Enc Vitals  BP: 100/70 (forearm)  Pulse: 84  Resp: 20  SpO2: 95 %  Weight: 99.2 kg (218 lb 9.6 oz)                                         Medical History  Surgical History Family History Social History   Past Medical History:   Diagnosis Date    Acute hypoxemic respiratory failure (H) 08/26/2023    Anxiety     Arthritis     Asthma     COPD (chronic obstructive pulmonary  disease) (H)     Depression     Heart failure with reduced ejection fraction (H)     Hepatic cyst 10/30/2017    New onset of congestive heart failure (H) 08/26/2023    Non-ischemic cardiomyopathy (H)     Obese     SAM (obstructive sleep apnea)     Sleep apnea     Past Surgical History:   Procedure Laterality Date    ADENOIDECTOMY      TONSILLECTOMY      Family History   Problem Relation Age of Onset    Diabetes Mother     Thyroid Disease Mother     Heart Disease Mother     Mental Illness Mother     Diabetes Maternal Grandmother     Heart Disease Maternal Grandmother     Bipolar Disorder Sister     Social History     Socioeconomic History    Marital status: Single     Spouse name: Not on file    Number of children: 1    Years of education:     Highest education level: Not on file   Occupational History    Not on file   Tobacco Use    Smoking status: Former     Packs/day: 1     Types: Cigarettes     Quit date: 1/1/2020     Years since quitting: 3.8    Smokeless tobacco: Never    Tobacco comments:     Decreased from 2 ppd to 1 ppd since May 2014   Substance and Sexual Activity    Alcohol use: No    Drug use: No    Sexual activity: Not Currently   Other Topics Concern    Not on file   Social History Narrative    Lives on her own.     Social Determinants of Health     Financial Resource Strain: Not on file   Food Insecurity: Not on file   Transportation Needs: Not on file   Physical Activity: Not on file   Stress: Not on file   Social Connections: Not on file   Interpersonal Safety: Not on file   Housing Stability: Not on file          Medications  Allergies   Current Outpatient Medications   Medication Sig Dispense Refill    albuterol (PROAIR HFA/PROVENTIL HFA/VENTOLIN HFA) 108 (90 Base) MCG/ACT inhaler Inhale 2 puffs into the lungs every 6 hours 18 g 11    albuterol (PROVENTIL) (2.5 MG/3ML) 0.083% neb solution Take 1 vial (2.5 mg) by nebulization every 6 hours as needed for shortness of breath or wheezing 90 mL 3     benztropine (COGENTIN) 0.5 MG tablet Take 0.5 mg by mouth At Bedtime      bumetanide (BUMEX) 0.5 MG tablet Take 1 tablet (0.5 mg) by mouth daily 90 tablet 1    DULoxetine (CYMBALTA) 30 MG capsule Take 60 mg by mouth daily      empagliflozin (JARDIANCE) 10 MG TABS tablet Take 1 tablet (10 mg) by mouth daily 90 tablet 1    fluticasone-vilanterol (BREO ELLIPTA) 200-25 MCG/ACT inhaler Inhale 1 puff into the lungs daily 60 each 11    gabapentin (NEURONTIN) 100 MG capsule Take 100 mg by mouth 2 times daily      gabapentin (NEURONTIN) 400 MG capsule Take 400 mg by mouth At Bedtime      methylcellulose (CITRUCEL) 500 MG TABS tablet Take 500 mg by mouth daily      metoprolol succinate ER (TOPROL XL) 25 MG 24 hr tablet Take 1 tablet (25 mg) by mouth daily 90 tablet 1    nicotine (NICORETTE) 4 MG lozenge Place 4 mg inside cheek as needed for smoking cessation      QUEtiapine (SEROQUEL) 100 MG tablet Take 150 mg by mouth At Bedtime      sacubitril-valsartan (ENTRESTO) 24-26 MG per tablet Take 1 tablet by mouth 2 times daily 180 tablet 1    umeclidinium (INCRUSE ELLIPTA) 62.5 MCG/ACT inhaler Inhale 1 puff into the lungs daily 30 each 11    cholecalciferol (VITAMIN D3) 125 mcg (5000 units) capsule Take 125 mcg by mouth daily (Patient not taking: Reported on 9/14/2023)      Allergies   Allergen Reactions    Amoxicillin Unknown     As kid had mold test and told allergic     Mold [Molds & Smuts] Unknown    Mold/Mildew [Molds & Smuts] Unknown    Other Environmental Allergy Unknown     DUST    Penicillins Unknown     As kid had mold test and told allergic     Pollen [Pollen Extract] Unknown         Lab Results    Chemistry/lipid CBC Cardiac Enzymes/BNP/TSH/INR   Lab Results   Component Value Date    CHOL 154 09/08/2023    HDL 28 (L) 09/08/2023    TRIG 166 (H) 09/08/2023    BUN 15.2 09/08/2023     09/08/2023    CO2 24 09/08/2023    Lab Results   Component Value Date    WBC 7.7 08/27/2023    HGB 12.2 08/27/2023    HCT 39.6  08/27/2023    MCV 91 08/27/2023     09/01/2023    Lab Results   Component Value Date    TROPONINI 0.02 01/01/2020    BNP 16 01/01/2020    TSH 0.45 08/27/2023             This note has been dictated using voice recognition software. Any grammatical, typographical, or context distortions are unintentional and inherent to the software    30 minutes spent on the date of encounter doing chart review, review of outside records, review of test results, interpretation with above tests, patient visit, documentation, and discussion with family.                Thank you for allowing me to participate in the care of your patient.      Sincerely,     JUAN Valladares [facility]  cc:   No referring provider defined for this encounter.

## 2023-10-26 NOTE — PROGRESS NOTES
Assessment/Recommendations   Assessment:    1. Nonischemic cardiomyopathy, heart failure with reduced ejection fraction, ejection fraction 15-20%, NYHA class II: Compensated.  She has fatigue.  Her weight has been stable.  She is scheduled for echocardiogram in November to reassess heart function.  We discussed if LVEF remains less than 35% will recommend ICD.  2.  Hypotension: Blood pressure 100/70.  She has occasional lightheadedness  3.  SAM: Her CPAP has been broken.  She needs to contact sleep medicine to obtain a new 1  4.  Obesity: BMI 37.52.  She is working on weight loss by exercising and watching her diet    Plan:  1.   Heart failure medications:  - Beta blocker therapy with metoprolol succinate 25 mg daily  - ARNI therapy with Entresto 24-26 mg twice a day  - SGLT2 inhibitor with Jardiance 10 mg daily  - Diuretic therapy with bumetanide 0.5 mg daily  2. Echocardiogram scheduled November 20  3.  Continue current medications.  Unable to titrate due to hypotension  4.  Continue monitoring daily weights and following a low-salt diet  5.  Continue regular exercise    Eva Parikh will follow up with Dr Vázquez November 14 and in the heart failure clinic in mid December.     History of Present Illness/Subjective    Ms. Eva Parikh is a 53 year old female seen at [facility] heart failure clinic today for continued follow-up. Her son Jakub accompanies her today.  She follows up for heart failure with reduced ejection fraction, nonischemic cardiomyopathy.  She was hospitalized late August with new heart failure.  She had symptoms of lower extremity edema and shortness of breath for about a month. She had an echocardiogram which showed an ejection fraction of 15 to 20% with moderate mitral regurgitation.  She had a stress cardiac MRI which was negative for inducible myocardial ischemia or infarction. She has a past medical history significant for SAM not on CPAP due to hers being broken,  COPD, asthma, obesity, anxiety.       During the last clinic visit, I decreased her bumex due to hypotension. She has not noticed any signs or symptoms of fluid retention. She has fatigue.  She denies shortness of breath, dyspnea on exertion, orthopnea, PND, palpitations, chest pain, abdominal fullness/bloating, and lower extremity edema.      She is monitoring home weights which are stable around 215 pounds.  She is following a low sodium diet.  She participates in regular physical activity including walking.       Physical Examination Review of Systems   /70   Pulse 84   Resp 20   Wt 99.2 kg (218 lb 9.6 oz)   SpO2 95%   BMI 37.52 kg/m    Body mass index is 37.52 kg/m .  Wt Readings from Last 3 Encounters:   10/26/23 99.2 kg (218 lb 9.6 oz)   10/05/23 99.3 kg (219 lb)   09/18/23 99.8 kg (220 lb)       General Appearance:   no acute distress   ENT/Mouth: No abnormalities   EYES:  no scleral icterus, normal conjunctivae   Neck: no thyromegaly   Chest/Lungs:   lungs are clear to auscultation, no rales or wheezing, equal chest wall expansion    Cardiovascular:   Regular. Normal first and second heart sounds with no murmurs, rubs, or gallops, no edema bilaterally    Abdomen:  bowel sounds are present   Extremities: no cyanosis or clubbing   Skin: warm   Neurologic: no tremors     Psychiatric: alert and oriented x3    Enc Vitals  BP: 100/70 (forearm)  Pulse: 84  Resp: 20  SpO2: 95 %  Weight: 99.2 kg (218 lb 9.6 oz)                                         Medical History  Surgical History Family History Social History   Past Medical History:   Diagnosis Date    Acute hypoxemic respiratory failure (H) 08/26/2023    Anxiety     Arthritis     Asthma     COPD (chronic obstructive pulmonary disease) (H)     Depression     Heart failure with reduced ejection fraction (H)     Hepatic cyst 10/30/2017    New onset of congestive heart failure (H) 08/26/2023    Non-ischemic cardiomyopathy (H)     Obese     SAM  (obstructive sleep apnea)     Sleep apnea     Past Surgical History:   Procedure Laterality Date    ADENOIDECTOMY      TONSILLECTOMY      Family History   Problem Relation Age of Onset    Diabetes Mother     Thyroid Disease Mother     Heart Disease Mother     Mental Illness Mother     Diabetes Maternal Grandmother     Heart Disease Maternal Grandmother     Bipolar Disorder Sister     Social History     Socioeconomic History    Marital status: Single     Spouse name: Not on file    Number of children: 1    Years of education: HS    Highest education level: Not on file   Occupational History    Not on file   Tobacco Use    Smoking status: Former     Packs/day: 1     Types: Cigarettes     Quit date: 1/1/2020     Years since quitting: 3.8    Smokeless tobacco: Never    Tobacco comments:     Decreased from 2 ppd to 1 ppd since May 2014   Substance and Sexual Activity    Alcohol use: No    Drug use: No    Sexual activity: Not Currently   Other Topics Concern    Not on file   Social History Narrative    Lives on her own.     Social Determinants of Health     Financial Resource Strain: Not on file   Food Insecurity: Not on file   Transportation Needs: Not on file   Physical Activity: Not on file   Stress: Not on file   Social Connections: Not on file   Interpersonal Safety: Not on file   Housing Stability: Not on file          Medications  Allergies   Current Outpatient Medications   Medication Sig Dispense Refill    albuterol (PROAIR HFA/PROVENTIL HFA/VENTOLIN HFA) 108 (90 Base) MCG/ACT inhaler Inhale 2 puffs into the lungs every 6 hours 18 g 11    albuterol (PROVENTIL) (2.5 MG/3ML) 0.083% neb solution Take 1 vial (2.5 mg) by nebulization every 6 hours as needed for shortness of breath or wheezing 90 mL 3    benztropine (COGENTIN) 0.5 MG tablet Take 0.5 mg by mouth At Bedtime      bumetanide (BUMEX) 0.5 MG tablet Take 1 tablet (0.5 mg) by mouth daily 90 tablet 1    DULoxetine (CYMBALTA) 30 MG capsule Take 60 mg by mouth  daily      empagliflozin (JARDIANCE) 10 MG TABS tablet Take 1 tablet (10 mg) by mouth daily 90 tablet 1    fluticasone-vilanterol (BREO ELLIPTA) 200-25 MCG/ACT inhaler Inhale 1 puff into the lungs daily 60 each 11    gabapentin (NEURONTIN) 100 MG capsule Take 100 mg by mouth 2 times daily      gabapentin (NEURONTIN) 400 MG capsule Take 400 mg by mouth At Bedtime      methylcellulose (CITRUCEL) 500 MG TABS tablet Take 500 mg by mouth daily      metoprolol succinate ER (TOPROL XL) 25 MG 24 hr tablet Take 1 tablet (25 mg) by mouth daily 90 tablet 1    nicotine (NICORETTE) 4 MG lozenge Place 4 mg inside cheek as needed for smoking cessation      QUEtiapine (SEROQUEL) 100 MG tablet Take 150 mg by mouth At Bedtime      sacubitril-valsartan (ENTRESTO) 24-26 MG per tablet Take 1 tablet by mouth 2 times daily 180 tablet 1    umeclidinium (INCRUSE ELLIPTA) 62.5 MCG/ACT inhaler Inhale 1 puff into the lungs daily 30 each 11    cholecalciferol (VITAMIN D3) 125 mcg (5000 units) capsule Take 125 mcg by mouth daily (Patient not taking: Reported on 9/14/2023)      Allergies   Allergen Reactions    Amoxicillin Unknown     As kid had mold test and told allergic     Mold [Molds & Smuts] Unknown    Mold/Mildew [Molds & Smuts] Unknown    Other Environmental Allergy Unknown     DUST    Penicillins Unknown     As kid had mold test and told allergic     Pollen [Pollen Extract] Unknown         Lab Results    Chemistry/lipid CBC Cardiac Enzymes/BNP/TSH/INR   Lab Results   Component Value Date    CHOL 154 09/08/2023    HDL 28 (L) 09/08/2023    TRIG 166 (H) 09/08/2023    BUN 15.2 09/08/2023     09/08/2023    CO2 24 09/08/2023    Lab Results   Component Value Date    WBC 7.7 08/27/2023    HGB 12.2 08/27/2023    HCT 39.6 08/27/2023    MCV 91 08/27/2023     09/01/2023    Lab Results   Component Value Date    TROPONINI 0.02 01/01/2020    BNP 16 01/01/2020    TSH 0.45 08/27/2023             This note has been dictated using voice  recognition software. Any grammatical, typographical, or context distortions are unintentional and inherent to the software    30 minutes spent on the date of encounter doing chart review, review of outside records, review of test results, interpretation with above tests, patient visit, documentation, and discussion with family.

## 2023-11-20 ENCOUNTER — HOSPITAL ENCOUNTER (OUTPATIENT)
Dept: CARDIOLOGY | Facility: HOSPITAL | Age: 53
Discharge: HOME | End: 2023-11-20
Attending: NURSE PRACTITIONER | Admitting: NURSE PRACTITIONER
Payer: COMMERCIAL

## 2023-11-20 DIAGNOSIS — I50.20 HEART FAILURE WITH REDUCED EJECTION FRACTION (H): Chronic | ICD-10-CM

## 2023-11-20 LAB — LVEF ECHO: NORMAL

## 2023-11-20 PROCEDURE — 93306 TTE W/DOPPLER COMPLETE: CPT

## 2023-11-20 PROCEDURE — 93306 TTE W/DOPPLER COMPLETE: CPT | Mod: 26 | Performed by: INTERNAL MEDICINE

## 2023-11-21 DIAGNOSIS — I50.20 HEART FAILURE WITH REDUCED EJECTION FRACTION (H): Primary | ICD-10-CM

## 2023-12-13 ENCOUNTER — OFFICE VISIT (OUTPATIENT)
Dept: CARDIOLOGY | Facility: CLINIC | Age: 53
End: 2023-12-13
Payer: COMMERCIAL

## 2023-12-13 VITALS
OXYGEN SATURATION: 94 % | RESPIRATION RATE: 24 BRPM | WEIGHT: 217 LBS | SYSTOLIC BLOOD PRESSURE: 104 MMHG | HEART RATE: 88 BPM | BODY MASS INDEX: 37.25 KG/M2 | DIASTOLIC BLOOD PRESSURE: 68 MMHG

## 2023-12-13 DIAGNOSIS — I42.8 NON-ISCHEMIC CARDIOMYOPATHY (H): Chronic | ICD-10-CM

## 2023-12-13 DIAGNOSIS — I42.8 NONCOMPACTION CARDIOMYOPATHY (H): ICD-10-CM

## 2023-12-13 DIAGNOSIS — I95.9 HYPOTENSION, UNSPECIFIED HYPOTENSION TYPE: ICD-10-CM

## 2023-12-13 DIAGNOSIS — I50.20 HEART FAILURE WITH REDUCED EJECTION FRACTION (H): Primary | Chronic | ICD-10-CM

## 2023-12-13 DIAGNOSIS — G47.33 OSA (OBSTRUCTIVE SLEEP APNEA): ICD-10-CM

## 2023-12-13 DIAGNOSIS — I50.9 NEW ONSET OF CONGESTIVE HEART FAILURE (H): ICD-10-CM

## 2023-12-13 PROCEDURE — 99215 OFFICE O/P EST HI 40 MIN: CPT | Performed by: NURSE PRACTITIONER

## 2023-12-13 RX ORDER — DULOXETIN HYDROCHLORIDE 60 MG/1
1 CAPSULE, DELAYED RELEASE ORAL AT BEDTIME
COMMUNITY

## 2023-12-13 RX ORDER — METOPROLOL SUCCINATE 25 MG/1
25 TABLET, EXTENDED RELEASE ORAL DAILY
Qty: 90 TABLET | Refills: 1 | Status: SHIPPED | OUTPATIENT
Start: 2023-12-13 | End: 2024-09-09

## 2023-12-13 NOTE — PROGRESS NOTES
Assessment/Recommendations   Assessment:    1. Nonischemic cardiomyopathy, heart failure with reduced ejection fraction, ejection fraction 20-25%, NYHA class III: Compensated.  She has fatigue, dyspnea on exertion and occasional lightheadedness.  Her weights have been stable.  She follows a low-sodium diet.  We discussed the results of her recent echocardiogram and recommendations seeing RUFUS CORCORAN to discuss ICD.  She and her son had many questions regarding this which were answered.  2.  Hypotension: Blood pressure 104/68.  She continues to have occasional lightheadedness.  3.  Obesity: BMI 37.25  4.  SAM: Does not currently wear her CPAP.  She states her CPAP has been broken.  Have recommended she follow-up with sleep medicine to obtain a new CPAP    Plan:  1.  Continue current medications.  Unable to titrate due to hypotension and symptoms of lightheadedness  2.  Continue monitoring weights and following a low-sodium diet  3.  Follow-up with sleep medicine to obtain new CPAP  4.  Work on weight loss    Eva Parikh will follow up with Dr. Vázquez January 22, RUFUS CORCORAN to discuss ICD and in the heart failure clinic in mid February.     History of Present Illness/Subjective    Ms. Eva Parikh is a 53 year old female seen at [facility] heart failure clinic today for continued follow-up.   Her son Jakub accompanies her today.  She follows up for heart failure with reduced ejection fraction, nonischemic cardiomyopathy.  She was hospitalized late August with new heart failure.  She had symptoms of lower extremity edema and shortness of breath for about a month. She had an echocardiogram which showed an ejection fraction of 15 to 20% with moderate mitral regurgitation.  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  Her recent echocardiogram on November 20 showed an ejection fraction of 20 to 25%.  She  has a past medical history significant for SAM not on CPAP due to hers being broken, COPD, asthma, obesity, anxiety.      Today, has fatigue, occasional lightheadedness and dyspnea on exertion.  She denies shortness of breath, orthopnea, PND, palpitations, chest pain, abdominal fullness/bloating, and lower extremity edema.      She is monitoring home weights which are stable between 213-215 pounds.  She is following a low sodium diet.      ECHOCARDIOGRAM: 11/20/2023-reviewed  Interpretation Summary     The left ventricle is moderately dilated.  Left ventricular function is decreased. The ejection fraction is 20-25%  (severely reduced).  There is severe global hypokinesia of the left ventricle.  IVC diameter <2.1 cm collapsing >50% with sniff suggests a normal RA pressure  of 3 mmHg.  Normal right ventricle size and systolic function.  No hemodynamically significant valvular abnormalities on 2D or color flow  imaging.    MRI Cardiac: 8/31/2023-reviewed  FINAL IMPRESSION   ==========================================================================================================     1.  Pharmacological Regadenoson stress cardiac MRI is negative for inducible myocardial ischemia.  2.  Pharmacological Regadenoson stress ECG is negative for inducible myocardial ischemia.  3.  Severe left ventricular enlargement, normal wall thickness and severe global hypokinesis.  No left  ventricular thrombus formation.  The calculated left ventricular ejection fraction is 19%.  T1 mapping  value is 1123 ms (normal reference less than 1000 ms).    4.  Significant left ventricular trabeculation with ratio of noncompaction over compaction more than 2.3.  This finding indicates noncompaction cardiomyopathy (if no other etiology of cardiomyopathy).  5.  No previous myocardial infarction, scar or other infiltrative process.  There are delayed gadolinium  enhancement in right ventricular insertion.  6.  Normal right ventricular size with  mildly reduced right ventricular systolic function.  7.  Dilated left atrium.  8.  No significant valvular disease.  9.  Trace pericardial effusion.     Physical Examination Review of Systems   /68 (BP Location: Left arm, Patient Position: Sitting, Cuff Size: Adult Large)   Pulse 88   Resp 24   Wt 98.4 kg (217 lb)   SpO2 94%   BMI 37.25 kg/m    Body mass index is 37.25 kg/m .  Wt Readings from Last 3 Encounters:   12/13/23 98.4 kg (217 lb)   10/26/23 99.2 kg (218 lb 9.6 oz)   10/05/23 99.3 kg (219 lb)       General Appearance:   no acute distress   ENT/Mouth: No abnormalities   EYES:  no scleral icterus, normal conjunctivae   Neck: no thyromegaly   Chest/Lungs:   lungs are clear to auscultation, no rales or wheezing, equal chest wall expansion    Cardiovascular:   Regular. Normal first and second heart sounds with no murmurs, rubs, or gallops, no edema bilaterally    Abdomen:  Obese, bowel sounds are present   Extremities: no cyanosis or clubbing   Skin: warm   Neurologic: no tremors     Psychiatric: alert and oriented x3    Enc Vitals  BP: 104/68  Pulse: 88  Resp: 24  SpO2: 94 %  Weight: 98.4 kg (217 lb) (Shoes on)                                         Medical History  Surgical History Family History Social History   Past Medical History:   Diagnosis Date    Acute hypoxemic respiratory failure (H) 08/26/2023    Anxiety     Arthritis     Asthma     COPD (chronic obstructive pulmonary disease) (H)     Depression     Heart failure with reduced ejection fraction (H)     Hepatic cyst 10/30/2017    New onset of congestive heart failure (H) 08/26/2023    Non-ischemic cardiomyopathy (H)     Obese     SAM (obstructive sleep apnea)     Sleep apnea     Past Surgical History:   Procedure Laterality Date    ADENOIDECTOMY      TONSILLECTOMY      Family History   Problem Relation Age of Onset    Diabetes Mother     Thyroid Disease Mother     Heart Disease Mother     Mental Illness Mother     Diabetes Maternal  Grandmother     Heart Disease Maternal Grandmother     Bipolar Disorder Sister     Social History     Socioeconomic History    Marital status: Single     Spouse name: Not on file    Number of children: 1    Years of education:     Highest education level: Not on file   Occupational History    Not on file   Tobacco Use    Smoking status: Former     Packs/day: 1     Types: Cigarettes     Quit date: 1/1/2020     Years since quitting: 3.9    Smokeless tobacco: Never    Tobacco comments:     Decreased from 2 ppd to 1 ppd since May 2014   Substance and Sexual Activity    Alcohol use: No    Drug use: No    Sexual activity: Not Currently   Other Topics Concern    Not on file   Social History Narrative    Lives on her own.     Social Determinants of Health     Financial Resource Strain: Not on file   Food Insecurity: Not on file   Transportation Needs: Not on file   Physical Activity: Not on file   Stress: Not on file   Social Connections: Not on file   Interpersonal Safety: Not on file   Housing Stability: Not on file          Medications  Allergies   Current Outpatient Medications   Medication Sig Dispense Refill    albuterol (PROAIR HFA/PROVENTIL HFA/VENTOLIN HFA) 108 (90 Base) MCG/ACT inhaler Inhale 2 puffs into the lungs every 6 hours 18 g 11    albuterol (PROVENTIL) (2.5 MG/3ML) 0.083% neb solution Take 1 vial (2.5 mg) by nebulization every 6 hours as needed for shortness of breath or wheezing 90 mL 3    benztropine (COGENTIN) 0.5 MG tablet Take 0.5 mg by mouth At Bedtime      bumetanide (BUMEX) 0.5 MG tablet Take 1 tablet (0.5 mg) by mouth daily 90 tablet 1    DULoxetine (CYMBALTA) 60 MG capsule Take 1 capsule by mouth at bedtime      empagliflozin (JARDIANCE) 10 MG TABS tablet Take 1 tablet (10 mg) by mouth daily 90 tablet 1    fluticasone-vilanterol (BREO ELLIPTA) 200-25 MCG/ACT inhaler Inhale 1 puff into the lungs daily 60 each 11    gabapentin (NEURONTIN) 100 MG capsule Take 100 mg by mouth 2 times daily       gabapentin (NEURONTIN) 400 MG capsule Take 400 mg by mouth At Bedtime      methylcellulose (CITRUCEL) 500 MG TABS tablet Take 500 mg by mouth as needed      metoprolol succinate ER (TOPROL XL) 25 MG 24 hr tablet Take 1 tablet (25 mg) by mouth daily 90 tablet 1    nicotine (NICORETTE) 4 MG lozenge Place 4 mg inside cheek as needed for smoking cessation      QUEtiapine (SEROQUEL) 100 MG tablet Take 150 mg by mouth At Bedtime      sacubitril-valsartan (ENTRESTO) 24-26 MG per tablet Take 1 tablet by mouth 2 times daily 180 tablet 1    umeclidinium (INCRUSE ELLIPTA) 62.5 MCG/ACT inhaler Inhale 1 puff into the lungs daily 30 each 11    cholecalciferol (VITAMIN D3) 125 mcg (5000 units) capsule Take 125 mcg by mouth daily (Patient not taking: Reported on 9/14/2023)      Allergies   Allergen Reactions    Amoxicillin Unknown     As kid had mold test and told allergic     Mold [Molds & Smuts] Unknown    Mold/Mildew [Molds & Smuts] Unknown    Other Environmental Allergy Unknown     DUST    Penicillins Unknown     As kid had mold test and told allergic     Pollen [Pollen Extract] Unknown         Lab Results    Chemistry/lipid CBC Cardiac Enzymes/BNP/TSH/INR   Lab Results   Component Value Date    CHOL 154 09/08/2023    HDL 28 (L) 09/08/2023    TRIG 166 (H) 09/08/2023    BUN 15.2 09/08/2023     09/08/2023    CO2 24 09/08/2023    Lab Results   Component Value Date    WBC 7.7 08/27/2023    HGB 12.2 08/27/2023    HCT 39.6 08/27/2023    MCV 91 08/27/2023     09/01/2023    Lab Results   Component Value Date    TROPONINI 0.02 01/01/2020    BNP 16 01/01/2020    TSH 0.45 08/27/2023             This note has been dictated using voice recognition software. Any grammatical, typographical, or context distortions are unintentional and inherent to the software    40 minutes spent on the date of encounter doing chart review, review of outside records, review of test results, interpretation with above tests, patient visit,  documentation, and discussion with family.

## 2023-12-13 NOTE — PATIENT INSTRUCTIONS
Eva Parikh,    It was a pleasure to see you today at [facility].     My recommendations after this visit include:  - Please follow up with Dr Vázquez January 22   - Please follow up with RUFUS CORCORAN to discuss ICD next available  - Please follow up with Katherine Pearce in mid February  - Continue current medications  - Follow up with sleep medicine    Katherine Pearce, CNP

## 2023-12-13 NOTE — LETTER
12/13/2023    Reymundo Avila MD  [address] [place]  [place] [address]    RE: Eva Parikh       Dear Colleague,     I had the pleasure of seeing Eva Parikh in the [facility].        Assessment/Recommendations   Assessment:    1. Nonischemic cardiomyopathy, heart failure with reduced ejection fraction, ejection fraction 20-25%, NYHA class III: Compensated.  She has fatigue, dyspnea on exertion and occasional lightheadedness.  Her weights have been stable.  She follows a low-sodium diet.  We discussed the results of her recent echocardiogram and recommendations seeing RUFUS CORCORAN to discuss ICD.  She and her son had many questions regarding this which were answered.  2.  Hypotension: Blood pressure 104/68.  She continues to have occasional lightheadedness.  3.  Obesity: BMI 37.25  4.  SAM: Does not currently wear her CPAP.  She states her CPAP has been broken.  Have recommended she follow-up with sleep medicine to obtain a new CPAP    Plan:  1.  Continue current medications.  Unable to titrate due to hypotension and symptoms of lightheadedness  2.  Continue monitoring weights and following a low-sodium diet  3.  Follow-up with sleep medicine to obtain new CPAP  4.  Work on weight loss    Eva Parikh will follow up with Dr. Vázquez January 22, RUFUS CORCORAN to discuss ICD and in the heart failure clinic in mid February.     History of Present Illness/Subjective    Ms. Eva Parikh is a 53 year old female seen at [facility] heart failure clinic today for continued follow-up.   Her son Jakub accompanies her today.  She follows up for heart failure with reduced ejection fraction, nonischemic cardiomyopathy.  She was hospitalized late August with new heart failure.  She had symptoms of lower extremity edema and shortness of breath for about a month. She had an echocardiogram which showed an ejection fraction of 15 to 20% with moderate mitral regurgitation.  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  Her recent echocardiogram on November 20 showed an ejection fraction of 20 to 25%.  She has a past medical history significant for SAM not on CPAP due to hers being broken, COPD, asthma, obesity, anxiety.      Today, has fatigue, occasional lightheadedness and dyspnea on exertion.  She denies shortness of breath, orthopnea, PND, palpitations, chest pain, abdominal fullness/bloating, and lower extremity edema.      She is monitoring home weights which are stable between 213-215 pounds.  She is following a low sodium diet.      ECHOCARDIOGRAM: 11/20/2023-reviewed  Interpretation Summary     The left ventricle is moderately dilated.  Left ventricular function is decreased. The ejection fraction is 20-25%  (severely reduced).  There is severe global hypokinesia of the left ventricle.  IVC diameter <2.1 cm collapsing >50% with sniff suggests a normal RA pressure  of 3 mmHg.  Normal right ventricle size and systolic function.  No hemodynamically significant valvular abnormalities on 2D or color flow  imaging.    MRI Cardiac: 8/31/2023-reviewed  FINAL IMPRESSION   ==========================================================================================================     1.  Pharmacological Regadenoson stress cardiac MRI is negative for inducible myocardial ischemia.  2.  Pharmacological Regadenoson stress ECG is negative for inducible myocardial ischemia.  3.  Severe left ventricular enlargement, normal wall thickness and severe global hypokinesis.  No left  ventricular thrombus formation.  The calculated left ventricular ejection fraction is 19%.  T1 mapping  value is 1123 ms (normal reference less than 1000 ms).    4.  Significant left ventricular trabeculation with ratio of noncompaction over compaction more than 2.3.  This finding indicates noncompaction cardiomyopathy (if no other  etiology of cardiomyopathy).  5.  No previous myocardial infarction, scar or other infiltrative process.  There are delayed gadolinium  enhancement in right ventricular insertion.  6.  Normal right ventricular size with mildly reduced right ventricular systolic function.  7.  Dilated left atrium.  8.  No significant valvular disease.  9.  Trace pericardial effusion.     Physical Examination Review of Systems   /68 (BP Location: Left arm, Patient Position: Sitting, Cuff Size: Adult Large)   Pulse 88   Resp 24   Wt 98.4 kg (217 lb)   SpO2 94%   BMI 37.25 kg/m    Body mass index is 37.25 kg/m .  Wt Readings from Last 3 Encounters:   12/13/23 98.4 kg (217 lb)   10/26/23 99.2 kg (218 lb 9.6 oz)   10/05/23 99.3 kg (219 lb)       General Appearance:   no acute distress   ENT/Mouth: No abnormalities   EYES:  no scleral icterus, normal conjunctivae   Neck: no thyromegaly   Chest/Lungs:   lungs are clear to auscultation, no rales or wheezing, equal chest wall expansion    Cardiovascular:   Regular. Normal first and second heart sounds with no murmurs, rubs, or gallops, no edema bilaterally    Abdomen:  Obese, bowel sounds are present   Extremities: no cyanosis or clubbing   Skin: warm   Neurologic: no tremors     Psychiatric: alert and oriented x3    Enc Vitals  BP: 104/68  Pulse: 88  Resp: 24  SpO2: 94 %  Weight: 98.4 kg (217 lb) (Shoes on)                                         Medical History  Surgical History Family History Social History   Past Medical History:   Diagnosis Date    Acute hypoxemic respiratory failure (H) 08/26/2023    Anxiety     Arthritis     Asthma     COPD (chronic obstructive pulmonary disease) (H)     Depression     Heart failure with reduced ejection fraction (H)     Hepatic cyst 10/30/2017    New onset of congestive heart failure (H) 08/26/2023    Non-ischemic cardiomyopathy (H)     Obese     SAM (obstructive sleep apnea)     Sleep apnea     Past Surgical History:   Procedure Laterality  Date    ADENOIDECTOMY      TONSILLECTOMY      Family History   Problem Relation Age of Onset    Diabetes Mother     Thyroid Disease Mother     Heart Disease Mother     Mental Illness Mother     Diabetes Maternal Grandmother     Heart Disease Maternal Grandmother     Bipolar Disorder Sister     Social History     Socioeconomic History    Marital status: Single     Spouse name: Not on file    Number of children: 1    Years of education: HS    Highest education level: Not on file   Occupational History    Not on file   Tobacco Use    Smoking status: Former     Packs/day: 1     Types: Cigarettes     Quit date: 1/1/2020     Years since quitting: 3.9    Smokeless tobacco: Never    Tobacco comments:     Decreased from 2 ppd to 1 ppd since May 2014   Substance and Sexual Activity    Alcohol use: No    Drug use: No    Sexual activity: Not Currently   Other Topics Concern    Not on file   Social History Narrative    Lives on her own.     Social Determinants of Health     Financial Resource Strain: Not on file   Food Insecurity: Not on file   Transportation Needs: Not on file   Physical Activity: Not on file   Stress: Not on file   Social Connections: Not on file   Interpersonal Safety: Not on file   Housing Stability: Not on file          Medications  Allergies   Current Outpatient Medications   Medication Sig Dispense Refill    albuterol (PROAIR HFA/PROVENTIL HFA/VENTOLIN HFA) 108 (90 Base) MCG/ACT inhaler Inhale 2 puffs into the lungs every 6 hours 18 g 11    albuterol (PROVENTIL) (2.5 MG/3ML) 0.083% neb solution Take 1 vial (2.5 mg) by nebulization every 6 hours as needed for shortness of breath or wheezing 90 mL 3    benztropine (COGENTIN) 0.5 MG tablet Take 0.5 mg by mouth At Bedtime      bumetanide (BUMEX) 0.5 MG tablet Take 1 tablet (0.5 mg) by mouth daily 90 tablet 1    DULoxetine (CYMBALTA) 60 MG capsule Take 1 capsule by mouth at bedtime      empagliflozin (JARDIANCE) 10 MG TABS tablet Take 1 tablet (10 mg) by mouth  daily 90 tablet 1    fluticasone-vilanterol (BREO ELLIPTA) 200-25 MCG/ACT inhaler Inhale 1 puff into the lungs daily 60 each 11    gabapentin (NEURONTIN) 100 MG capsule Take 100 mg by mouth 2 times daily      gabapentin (NEURONTIN) 400 MG capsule Take 400 mg by mouth At Bedtime      methylcellulose (CITRUCEL) 500 MG TABS tablet Take 500 mg by mouth as needed      metoprolol succinate ER (TOPROL XL) 25 MG 24 hr tablet Take 1 tablet (25 mg) by mouth daily 90 tablet 1    nicotine (NICORETTE) 4 MG lozenge Place 4 mg inside cheek as needed for smoking cessation      QUEtiapine (SEROQUEL) 100 MG tablet Take 150 mg by mouth At Bedtime      sacubitril-valsartan (ENTRESTO) 24-26 MG per tablet Take 1 tablet by mouth 2 times daily 180 tablet 1    umeclidinium (INCRUSE ELLIPTA) 62.5 MCG/ACT inhaler Inhale 1 puff into the lungs daily 30 each 11    cholecalciferol (VITAMIN D3) 125 mcg (5000 units) capsule Take 125 mcg by mouth daily (Patient not taking: Reported on 9/14/2023)      Allergies   Allergen Reactions    Amoxicillin Unknown     As kid had mold test and told allergic     Mold [Molds & Smuts] Unknown    Mold/Mildew [Molds & Smuts] Unknown    Other Environmental Allergy Unknown     DUST    Penicillins Unknown     As kid had mold test and told allergic     Pollen [Pollen Extract] Unknown         Lab Results    Chemistry/lipid CBC Cardiac Enzymes/BNP/TSH/INR   Lab Results   Component Value Date    CHOL 154 09/08/2023    HDL 28 (L) 09/08/2023    TRIG 166 (H) 09/08/2023    BUN 15.2 09/08/2023     09/08/2023    CO2 24 09/08/2023    Lab Results   Component Value Date    WBC 7.7 08/27/2023    HGB 12.2 08/27/2023    HCT 39.6 08/27/2023    MCV 91 08/27/2023     09/01/2023    Lab Results   Component Value Date    TROPONINI 0.02 01/01/2020    BNP 16 01/01/2020    TSH 0.45 08/27/2023             This note has been dictated using voice recognition software. Any grammatical, typographical, or context distortions are  unintentional and inherent to the software    40 minutes spent on the date of encounter doing chart review, review of outside records, review of test results, interpretation with above tests, patient visit, documentation, and discussion with family.                Thank you for allowing me to participate in the care of your patient.      Sincerely,     JUAN Valladares CNP     [facility]  cc:   JUAN Valladares CNP  [address], [address]  [place] [address]

## 2024-01-10 ENCOUNTER — OFFICE VISIT (OUTPATIENT)
Dept: CARDIOLOGY | Facility: CLINIC | Age: 54
End: 2024-01-10
Payer: COMMERCIAL

## 2024-01-10 ENCOUNTER — DOCUMENTATION ONLY (OUTPATIENT)
Dept: CARDIOLOGY | Facility: CLINIC | Age: 54
End: 2024-01-10

## 2024-01-10 ENCOUNTER — PREP FOR PROCEDURE (OUTPATIENT)
Dept: CARDIOLOGY | Facility: CLINIC | Age: 54
End: 2024-01-10

## 2024-01-10 VITALS
HEART RATE: 88 BPM | BODY MASS INDEX: 36.56 KG/M2 | WEIGHT: 213 LBS | DIASTOLIC BLOOD PRESSURE: 63 MMHG | RESPIRATION RATE: 18 BRPM | SYSTOLIC BLOOD PRESSURE: 88 MMHG | OXYGEN SATURATION: 95 %

## 2024-01-10 DIAGNOSIS — I42.8 NONISCHEMIC CARDIOMYOPATHY (H): Primary | ICD-10-CM

## 2024-01-10 DIAGNOSIS — I50.20 HEART FAILURE WITH REDUCED EJECTION FRACTION (H): Primary | Chronic | ICD-10-CM

## 2024-01-10 DIAGNOSIS — I42.8 NON-ISCHEMIC CARDIOMYOPATHY (H): Chronic | ICD-10-CM

## 2024-01-10 DIAGNOSIS — I50.20 HEART FAILURE WITH REDUCED EJECTION FRACTION (H): ICD-10-CM

## 2024-01-10 PROCEDURE — 99205 OFFICE O/P NEW HI 60 MIN: CPT | Performed by: INTERNAL MEDICINE

## 2024-01-10 PROCEDURE — G2211 COMPLEX E/M VISIT ADD ON: HCPCS | Performed by: INTERNAL MEDICINE

## 2024-01-10 RX ORDER — SODIUM CHLORIDE 9 MG/ML
100 INJECTION, SOLUTION INTRAVENOUS CONTINUOUS
Status: CANCELLED | OUTPATIENT
Start: 2024-02-12

## 2024-01-10 RX ORDER — CLINDAMYCIN PHOSPHATE 900 MG/50ML
900 INJECTION, SOLUTION INTRAVENOUS
Status: CANCELLED | OUTPATIENT
Start: 2024-01-10

## 2024-01-10 RX ORDER — LIDOCAINE 40 MG/G
CREAM TOPICAL
Status: CANCELLED | OUTPATIENT
Start: 2024-01-10

## 2024-01-10 NOTE — PROGRESS NOTES
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  We reviewed the rates of successful CS lead placement via CSP vs vs lead placement and discussed CRT response rates.  Procedural risks (infection, pericardial effusion/tamponade, lead dislodgement, pneumothorax) and recovery risks were reviewed.  She would prefer to proceed with CRT-D implantation.  - primary prevention CRT-D implantation, LBP vs CS lead placement  - continue metoprolol XL 25mg daily, Entresto  24-26mg twice daily, empagliflozin 10mg daily, bumetanide 0.5mg daily  - can consider genetic referral and testing of her children - she will discuss further with her cardiology/HF team  - the longitudinal plan of care was addressed during this visit. 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    Mrs. Parikh has been noted to have systolic heart failure with LVEF <35% despite tolerated medical therapies since admission 8/2023.  She notes dyspnea with moderate exertion.    She denies chest pain, syncope.  She is seen today with her son.       Physical Examination  Review of Systems   VITALS: BP (!) 88/63 (BP Location: Right arm, Patient Position: Sitting, Cuff Size: Adult Large)   Pulse 88   Resp 18   Wt 96.6 kg (213 lb)   SpO2 95%   BMI 36.56 kg/m    Wt Readings from Last 3 Encounters:   12/13/23 98.4 kg (217 lb)   10/26/23 99.2 kg (218 lb 9.6 oz)   10/05/23 99.3 kg (219 lb)     CONSTITUTIONAL: well nourished, comfortable, no distress  EYES:  Conjunctivae pink, sclerae clear.    E/N/T:  Oral mucosa pink  RESPIRATORY:  Respiratory effort is normal  CARDIOVASCULAR:  normal S1 and S2  GASTROINTESTINAL:  Abdomen without masses or tenderness  EXTREMITIES:  No clubbing or cyanosis.    MUSCULOSKELETAL:  Overall grossly normal muscle strength  SKIN:  Overall, skin warm and dry, no lesions.  NEURO/PSYCH:  Oriented x 3 with normal affect.   Constitutional:  No weight loss or loss of appetite     Eyes:  No difficulty with vision, no double vision, no dry eyes  ENT:  No sore throat, difficulty swallowing; changes in hearing or tinnitus  Cardiovascular: As detailed above  Respiratory:  No cough  Musculoskeletal  No joint pain, muscle aches  Neurologic:  No syncope, lightheadedness, fainting spells   Hematologic: No easy bruising, excessive bleeding tendency   Gastrointestinal:  No jaundice, abdominal pain or abdominal bloating  Genitourinary: No changes in urinary habits, no trouble urinating    Psychiatric: No anxiety or depression      Medical History  Surgical History   Past Medical History:   Diagnosis Date     Acute hypoxemic respiratory failure (H) 2023     Anxiety      Arthritis      Asthma      COPD (chronic obstructive pulmonary disease) (H)      Depression      Heart failure with reduced ejection fraction (H)      Hepatic cyst 10/30/2017     New onset of congestive heart failure (H) 2023     Non-ischemic cardiomyopathy (H)      Obese      SAM (obstructive sleep apnea)      Sleep apnea     Past Surgical History:   Procedure Laterality Date     ADENOIDECTOMY       TONSILLECTOMY           Family History Social History   Family History   Problem Relation Age of Onset     Diabetes Mother      Thyroid Disease Mother      Heart Disease Mother      Mental Illness Mother      Diabetes Maternal Grandmother      Heart Disease Maternal Grandmother      Bipolar Disorder Sister         Social History     Tobacco Use     Smoking status: Former     Packs/day: 1     Types: Cigarettes     Quit date: 2020     Years since quittin.0     Smokeless tobacco: Never     Tobacco comments:     Decreased from 2 ppd to 1 ppd since May 2014   Substance Use Topics     Alcohol use: No     Drug use: No         Medications  Allergies     Current Outpatient Medications:      albuterol (PROAIR HFA/PROVENTIL HFA/VENTOLIN HFA) 108 (90 Base) MCG/ACT inhaler, Inhale 2 puffs into the lungs every 6 hours, Disp: 18 g,  Rfl: 11     albuterol (PROVENTIL) (2.5 MG/3ML) 0.083% neb solution, Take 1 vial (2.5 mg) by nebulization every 6 hours as needed for shortness of breath or wheezing, Disp: 90 mL, Rfl: 3     benztropine (COGENTIN) 0.5 MG tablet, Take 0.5 mg by mouth At Bedtime, Disp: , Rfl:      bumetanide (BUMEX) 0.5 MG tablet, Take 1 tablet (0.5 mg) by mouth daily, Disp: 90 tablet, Rfl: 1     cholecalciferol (VITAMIN D3) 125 mcg (5000 units) capsule, Take 125 mcg by mouth daily (Patient not taking: Reported on 9/14/2023), Disp: , Rfl:      DULoxetine (CYMBALTA) 60 MG capsule, Take 1 capsule by mouth at bedtime, Disp: , Rfl:      empagliflozin (JARDIANCE) 10 MG TABS tablet, Take 1 tablet (10 mg) by mouth daily, Disp: 90 tablet, Rfl: 1     fluticasone-vilanterol (BREO ELLIPTA) 200-25 MCG/ACT inhaler, Inhale 1 puff into the lungs daily, Disp: 60 each, Rfl: 11     gabapentin (NEURONTIN) 100 MG capsule, Take 100 mg by mouth 2 times daily, Disp: , Rfl:      gabapentin (NEURONTIN) 400 MG capsule, Take 400 mg by mouth At Bedtime, Disp: , Rfl:      methylcellulose (CITRUCEL) 500 MG TABS tablet, Take 500 mg by mouth as needed, Disp: , Rfl:      metoprolol succinate ER (TOPROL XL) 25 MG 24 hr tablet, Take 1 tablet (25 mg) by mouth daily, Disp: 90 tablet, Rfl: 1     nicotine (NICORETTE) 4 MG lozenge, Place 4 mg inside cheek as needed for smoking cessation, Disp: , Rfl:      QUEtiapine (SEROQUEL) 100 MG tablet, Take 150 mg by mouth At Bedtime, Disp: , Rfl:      sacubitril-valsartan (ENTRESTO) 24-26 MG per tablet, Take 1 tablet by mouth 2 times daily, Disp: 180 tablet, Rfl: 1     umeclidinium (INCRUSE ELLIPTA) 62.5 MCG/ACT inhaler, Inhale 1 puff into the lungs daily, Disp: 30 each, Rfl: 11     Allergies   Allergen Reactions     Amoxicillin Unknown     As kid had mold test and told allergic      Mold [Molds & Smuts] Unknown     Mold/Mildew [Molds & Smuts] Unknown     Other Environmental Allergy Unknown     DUST     Penicillins Unknown     As kid  "had mold test and told allergic      Pollen [Pollen Extract] Unknown          Lab Results    Chemistry CBC Cardiac Enzymes/BNP/TSH/INR   Recent Labs   Lab Test 09/08/23  0903      POTASSIUM 4.1   CHLORIDE 100   CO2 24   *   BUN 15.2   CR 0.90   GFRESTIMATED 76   KANU 9.2     Recent Labs   Lab Test 09/08/23  0903 09/01/23  0442 08/31/23  0517   CR 0.90 0.82 1.09*          Recent Labs   Lab Test 09/01/23  0442 08/29/23  0431 08/27/23  0555   WBC  --   --  7.7   HGB  --   --  12.2   HCT  --   --  39.6   MCV  --   --  91      < > 163    < > = values in this interval not displayed.     Recent Labs   Lab Test 08/27/23  0555 08/26/23 2158 03/12/20  0238   HGB 12.2 12.9 12.9    Recent Labs   Lab Test 01/01/20  1802   TROPONINI 0.02     Recent Labs   Lab Test 08/26/23 2158 01/01/20  1802   BNP  --  16   NTBNPI 9,487*  --      Recent Labs   Lab Test 08/27/23  0555   TSH 0.45     No results for input(s): \"INR\" in the last 92034 hours.      Data Review    ECGs (tracings independently reviewed)  8/31/2023 - SR 96bpm, IVCD QRS in LBBB pattern 148ms  8/31/2023 - SR 97bpm, IVCD in LBBB pattern 150ms    11/20/2023 TTE  The left ventricle is moderately dilated.  Left ventricular function is decreased. The ejection fraction is 20-25%  (severely reduced).  There is severe global hypokinesia of the left ventricle.  IVC diameter <2.1 cm collapsing >50% with sniff suggests a normal RA pressure  of 3 mmHg.  Normal right ventricle size and systolic function.  No hemodynamically significant valvular abnormalities on 2D or color flow  imaging.    8/31/2023 cardiac MRI  1.  Pharmacological Regadenoson stress cardiac MRI is negative for inducible myocardial ischemia.  2.  Pharmacological Regadenoson stress ECG is negative for inducible myocardial ischemia.  3.  Severe left ventricular enlargement, normal wall thickness and severe global hypokinesis.  No left  ventricular thrombus formation.  The calculated left ventricular " ejection fraction is 19%.  T1 mapping  value is 1123 ms (normal reference less than 1000 ms).    4.  Significant left ventricular trabeculation with ratio of noncompaction over compaction more than 2.3.  This finding indicates noncompaction cardiomyopathy (if no other etiology of cardiomyopathy).  5.  No previous myocardial infarction, scar or other infiltrative process.  There are delayed gadolinium  enhancement in right ventricular insertion.  6.  Normal right ventricular size with mildly reduced right ventricular systolic function.  7.  Dilated left atrium.  8.  No significant valvular disease.  9.  Trace pericardial effusion.       Cc: Katherine Pearce CNP, Reymundo Avila MD Amila Dilusha William, MD  1/10/2024  8:55 AM

## 2024-01-10 NOTE — PROGRESS NOTES
H&P  PMD: []  Received [] Card OV: []  Date:  Teach  []   Orders  I [x] P  [x]  Letter []   AC: None- NA Diuretics:  Bumex  DM Meds:  Jardiance  GLP-1:None    Hold NSAIDS 72 hours prior excepts ASA, Hold Diuretics/ACE inhibitors/ARBs/Entresto 24 hrs, Hold Dipyridamole and ASA/Dipyridamole   Tierra Elias MD  P [facility],    Can we please set Mrs. Parikh up for CRT-D implantation (LBP vs CS lead), Medtronic, MAC if available.    Teresa Rees    1970  Home:[phone] (home) Cell:[phone] (mobile)  Emergency Contact: Jorge AlbertoJakub robledo [phone]  PCP: Reymundo Avila, [phone]      Important patient information for CSC/Cath Lab staff : None    [facility] EP Cath Lab Procedure Order     Device Implant/Revision:  Procedure: New Implant  Current Device/Device Co Needed for Procedure: BIV ICD Medtronic  Ordering Provider: Dr Curt Smith Ordered and Prepped: 1/10/2024 Radha Berger RN  Diagnosis:  Cardiomyopathy with LVEF 20-25%.  Scheduling Timeframe:  Next Available  Scheduling Restrictions: None  Scheduling Contact: Please contact pt to schedule, if you are unable to schedule date within the next 24 hours please contact pt to update on scheduling process  Cardiology Follow Up Apt s/p:  New CRT/HIS Lead Device follow-up with HF JESE, EF < 50%, &/or has a hx/dx of HF or CM and HF/General Card @ 3mo  Pre-Procedural Testing needed: None  Anesthesia:  MAC- Monitored Anesthesia Care    [facility] EP Cath Lab Prep   H&P:  Compled by cardiology on 1/10/24 if scheduled within 30 days, pt to schedule with PMD if procedure outside of this timeframe  Pre-Procedure Labs/T&S: For SICD & MICRA Devices only schedule lab visit at [facility] lab within 3 days prior to procedure for T&S, BMP, CBC, HcG is appropriate, and INR if on warfarin. All other Devices pre-procedure lab work will be done the morning of the procedure.  Medical Records Pertinent for Procedure:  CT/MRI 8/26/23, Echo 11/20/23, and EKG  8/26/23.  Iodinated Contrast Dye Allergies (Does not include Shellfish, Egg, and/or Iodine Allergy)- excludes ILR/SICD:None  Renal Protocol (GFR < 40ml/min, IV contrast past 2 days, EF < or = 25%)- excludes ILR/SICD: Yes- Hold NSAIDS 72 hours prior excepts ASA, Hold Diuretics/ACE inhibitors/ARBs/Entresto 24 hrs, Hold Dipyridamole and ASA/Dipyridamole  GLP-1 Protocol: If on Dulaglutide (Trulicity) (weekly)- Injection hold 7 days prior to procedure  , Exenatide extended release (Bydureon bcise) (weekly)- Injection hold 7 days prior to procedure, Exenatide (Byetta) (twice daily)- Oral Tablet hold day prior and morning of procedure and for Injection hold 7 days prior to procedure, Semaglutide (Ozempic) (weekly)- Injection and Oral hold 7 days prior to procedure, Liraglutide (Victoza, Saxenda) (daily)- Injection hold day prior and morning of procedure    Allergies   Allergen Reactions    Amoxicillin Unknown     As kid had mold test and told allergic     Mold [Molds & Smuts] Unknown    Mold/Mildew [Molds & Smuts] Unknown    Other Environmental Allergy Unknown     DUST    Penicillins Unknown     As kid had mold test and told allergic     Pollen [Pollen Extract] Unknown       Current Outpatient Medications:     albuterol (PROAIR HFA/PROVENTIL HFA/VENTOLIN HFA) 108 (90 Base) MCG/ACT inhaler, Inhale 2 puffs into the lungs every 6 hours, Disp: 18 g, Rfl: 11    albuterol (PROVENTIL) (2.5 MG/3ML) 0.083% neb solution, Take 1 vial (2.5 mg) by nebulization every 6 hours as needed for shortness of breath or wheezing, Disp: 90 mL, Rfl: 3    benztropine (COGENTIN) 0.5 MG tablet, Take 0.5 mg by mouth At Bedtime, Disp: , Rfl:     bumetanide (BUMEX) 0.5 MG tablet, Take 1 tablet (0.5 mg) by mouth daily, Disp: 90 tablet, Rfl: 1    cholecalciferol (VITAMIN D3) 125 mcg (5000 units) capsule, Take 125 mcg by mouth daily, Disp: , Rfl:     DULoxetine (CYMBALTA) 60 MG capsule, Take 1 capsule by mouth at bedtime, Disp: , Rfl:     empagliflozin  (JARDIANCE) 10 MG TABS tablet, Take 1 tablet (10 mg) by mouth daily, Disp: 90 tablet, Rfl: 1    fluticasone-vilanterol (BREO ELLIPTA) 200-25 MCG/ACT inhaler, Inhale 1 puff into the lungs daily, Disp: 60 each, Rfl: 11    gabapentin (NEURONTIN) 100 MG capsule, Take 100 mg by mouth 2 times daily, Disp: , Rfl:     gabapentin (NEURONTIN) 400 MG capsule, Take 400 mg by mouth At Bedtime, Disp: , Rfl:     methylcellulose (CITRUCEL) 500 MG TABS tablet, Take 500 mg by mouth as needed, Disp: , Rfl:     metoprolol succinate ER (TOPROL XL) 25 MG 24 hr tablet, Take 1 tablet (25 mg) by mouth daily, Disp: 90 tablet, Rfl: 1    nicotine (NICORETTE) 4 MG lozenge, Place 4 mg inside cheek as needed for smoking cessation, Disp: , Rfl:     QUEtiapine (SEROQUEL) 100 MG tablet, Take 1.5 tablets by mouth at bedtime, Disp: , Rfl:     sacubitril-valsartan (ENTRESTO) 24-26 MG per tablet, Take 1 tablet by mouth 2 times daily, Disp: 180 tablet, Rfl: 1    umeclidinium (INCRUSE ELLIPTA) 62.5 MCG/ACT inhaler, Inhale 1 puff into the lungs daily, Disp: 30 each, Rfl: 11    Documentation Date:1/10/2024 9:08 AM  Radha Berger RN

## 2024-01-10 NOTE — LETTER
1/10/2024    Reymundo Avila MD  [address]  [place] [address]    RE: Eva Parikh       Dear Colleague,     I had the pleasure of seeing Eva Parikh in the [facility].     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  We reviewed the rates of successful CS lead placement via CSP vs vs lead placement and discussed CRT response rates.  Procedural risks (infection, pericardial effusion/tamponade, lead  dislodgement, pneumothorax) and recovery risks were reviewed.  She would prefer to proceed with CRT-D implantation.  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    Mrs. Parikh has been noted to have systolic heart failure with LVEF <35% despite tolerated medical therapies since admission 8/2023.  She notes dyspnea with moderate exertion.    She denies chest pain, syncope.  She is seen today with her son.       Physical Examination  Review of Systems   VITALS: BP (!) 88/63 (BP Location: Right arm, Patient Position: Sitting, Cuff Size: Adult Large)   Pulse 88   Resp 18   Wt 96.6 kg (213 lb)   SpO2 95%   BMI 36.56 kg/m    Wt Readings from Last 3 Encounters:   12/13/23 98.4 kg (217 lb)   10/26/23 99.2 kg (218 lb 9.6 oz)   10/05/23 99.3 kg (219 lb)     CONSTITUTIONAL: well nourished, comfortable, no distress  EYES:  Conjunctivae pink, sclerae clear.    E/N/T:  Oral mucosa pink  RESPIRATORY:  Respiratory effort is normal  CARDIOVASCULAR:  normal S1 and S2  GASTROINTESTINAL:  Abdomen without masses or tenderness  EXTREMITIES:  No clubbing or cyanosis.    MUSCULOSKELETAL:  Overall grossly normal muscle strength  SKIN:  Overall, skin warm and dry, no lesions.  NEURO/PSYCH:  Oriented x 3 with normal affect.   Constitutional:  No weight loss or loss of appetite    Eyes:  No difficulty with vision, no double vision, no  dry eyes  ENT:  No sore throat, difficulty swallowing; changes in hearing or tinnitus  Cardiovascular: As detailed above  Respiratory:  No cough  Musculoskeletal  No joint pain, muscle aches  Neurologic:  No syncope, lightheadedness, fainting spells   Hematologic: No easy bruising, excessive bleeding tendency   Gastrointestinal:  No jaundice, abdominal pain or abdominal bloating  Genitourinary: No changes in urinary habits, no trouble urinating    Psychiatric: No anxiety or depression      Medical History  Surgical History   Past Medical History:   Diagnosis Date    Acute hypoxemic respiratory failure (H) 2023    Anxiety     Arthritis     Asthma     COPD (chronic obstructive pulmonary disease) (H)     Depression     Heart failure with reduced ejection fraction (H)     Hepatic cyst 10/30/2017    New onset of congestive heart failure (H) 2023    Non-ischemic cardiomyopathy (H)     Obese     SAM (obstructive sleep apnea)     Sleep apnea     Past Surgical History:   Procedure Laterality Date    ADENOIDECTOMY      TONSILLECTOMY           Family History Social History   Family History   Problem Relation Age of Onset    Diabetes Mother     Thyroid Disease Mother     Heart Disease Mother     Mental Illness Mother     Diabetes Maternal Grandmother     Heart Disease Maternal Grandmother     Bipolar Disorder Sister         Social History     Tobacco Use    Smoking status: Former     Packs/day: 1     Types: Cigarettes     Quit date: 2020     Years since quittin.0    Smokeless tobacco: Never    Tobacco comments:     Decreased from 2 ppd to 1 ppd since May 2014   Substance Use Topics    Alcohol use: No    Drug use: No         Medications  Allergies     Current Outpatient Medications:     albuterol (PROAIR HFA/PROVENTIL HFA/VENTOLIN HFA) 108 (90 Base) MCG/ACT inhaler, Inhale 2 puffs into the lungs every 6 hours, Disp: 18 g, Rfl: 11    albuterol (PROVENTIL) (2.5 MG/3ML) 0.083% neb solution, Take 1 vial (2.5 mg)  by nebulization every 6 hours as needed for shortness of breath or wheezing, Disp: 90 mL, Rfl: 3    benztropine (COGENTIN) 0.5 MG tablet, Take 0.5 mg by mouth At Bedtime, Disp: , Rfl:     bumetanide (BUMEX) 0.5 MG tablet, Take 1 tablet (0.5 mg) by mouth daily, Disp: 90 tablet, Rfl: 1    cholecalciferol (VITAMIN D3) 125 mcg (5000 units) capsule, Take 125 mcg by mouth daily (Patient not taking: Reported on 9/14/2023), Disp: , Rfl:     DULoxetine (CYMBALTA) 60 MG capsule, Take 1 capsule by mouth at bedtime, Disp: , Rfl:     empagliflozin (JARDIANCE) 10 MG TABS tablet, Take 1 tablet (10 mg) by mouth daily, Disp: 90 tablet, Rfl: 1    fluticasone-vilanterol (BREO ELLIPTA) 200-25 MCG/ACT inhaler, Inhale 1 puff into the lungs daily, Disp: 60 each, Rfl: 11    gabapentin (NEURONTIN) 100 MG capsule, Take 100 mg by mouth 2 times daily, Disp: , Rfl:     gabapentin (NEURONTIN) 400 MG capsule, Take 400 mg by mouth At Bedtime, Disp: , Rfl:     methylcellulose (CITRUCEL) 500 MG TABS tablet, Take 500 mg by mouth as needed, Disp: , Rfl:     metoprolol succinate ER (TOPROL XL) 25 MG 24 hr tablet, Take 1 tablet (25 mg) by mouth daily, Disp: 90 tablet, Rfl: 1    nicotine (NICORETTE) 4 MG lozenge, Place 4 mg inside cheek as needed for smoking cessation, Disp: , Rfl:     QUEtiapine (SEROQUEL) 100 MG tablet, Take 150 mg by mouth At Bedtime, Disp: , Rfl:     sacubitril-valsartan (ENTRESTO) 24-26 MG per tablet, Take 1 tablet by mouth 2 times daily, Disp: 180 tablet, Rfl: 1    umeclidinium (INCRUSE ELLIPTA) 62.5 MCG/ACT inhaler, Inhale 1 puff into the lungs daily, Disp: 30 each, Rfl: 11     Allergies   Allergen Reactions    Amoxicillin Unknown     As kid had mold test and told allergic     Mold [Molds & Smuts] Unknown    Mold/Mildew [Molds & Smuts] Unknown    Other Environmental Allergy Unknown     DUST    Penicillins Unknown     As kid had mold test and told allergic     Pollen [Pollen Extract] Unknown          Lab Results    Chemistry CBC  "Cardiac Enzymes/BNP/TSH/INR   Recent Labs   Lab Test 09/08/23  0903      POTASSIUM 4.1   CHLORIDE 100   CO2 24   *   BUN 15.2   CR 0.90   GFRESTIMATED 76   KANU 9.2     Recent Labs   Lab Test 09/08/23  0903 09/01/23  0442 08/31/23  0517   CR 0.90 0.82 1.09*          Recent Labs   Lab Test 09/01/23  0442 08/29/23  0431 08/27/23  0555   WBC  --   --  7.7   HGB  --   --  12.2   HCT  --   --  39.6   MCV  --   --  91      < > 163    < > = values in this interval not displayed.     Recent Labs   Lab Test 08/27/23  0555 08/26/23 2158 03/12/20  0238   HGB 12.2 12.9 12.9    Recent Labs   Lab Test 01/01/20  1802   TROPONINI 0.02     Recent Labs   Lab Test 08/26/23 2158 01/01/20  1802   BNP  --  16   NTBNPI 9,487*  --      Recent Labs   Lab Test 08/27/23  0555   TSH 0.45     No results for input(s): \"INR\" in the last 30273 hours.      Data Review    ECGs (tracings independently reviewed)  8/31/2023 - SR 96bpm, IVCD QRS in LBBB pattern 148ms  8/31/2023 - SR 97bpm, IVCD in LBBB pattern 150ms    11/20/2023 TTE  The left ventricle is moderately dilated.  Left ventricular function is decreased. The ejection fraction is 20-25%  (severely reduced).  There is severe global hypokinesia of the left ventricle.  IVC diameter <2.1 cm collapsing >50% with sniff suggests a normal RA pressure  of 3 mmHg.  Normal right ventricle size and systolic function.  No hemodynamically significant valvular abnormalities on 2D or color flow  imaging.    8/31/2023 cardiac MRI  1.  Pharmacological Regadenoson stress cardiac MRI is negative for inducible myocardial ischemia.  2.  Pharmacological Regadenoson stress ECG is negative for inducible myocardial ischemia.  3.  Severe left ventricular enlargement, normal wall thickness and severe global hypokinesis.  No left  ventricular thrombus formation.  The calculated left ventricular ejection fraction is 19%.  T1 mapping  value is 1123 ms (normal reference less than 1000 ms).    4.  " Significant left ventricular trabeculation with ratio of noncompaction over compaction more than 2.3.  This finding indicates noncompaction cardiomyopathy (if no other etiology of cardiomyopathy).  5.  No previous myocardial infarction, scar or other infiltrative process.  There are delayed gadolinium  enhancement in right ventricular insertion.  6.  Normal right ventricular size with mildly reduced right ventricular systolic function.  7.  Dilated left atrium.  8.  No significant valvular disease.  9.  Trace pericardial effusion.       Cc: Katherine Pearce CNP, Reymundo Avila MD Amila Dilusha William, MD  1/10/2024  8:55 AM        Thank you for allowing me to participate in the care of your patient.      Sincerely,     Tierra Elias MD     [facility]  cc:   JUAN Valladares CNP  [address], [address]  [place] [address]

## 2024-01-10 NOTE — PATIENT INSTRUCTIONS
[facility]  Cardiac Electrophysiology  [address]  [place] [address]   Office: [phone]  Fax: [phone]       Thank you for seeing us in clinic today - it is a pleasure to be a part of your care team.  Below is a summary of our plan from today's visit.       You have had chronic ventricular dysfunction (heart failure), with left ventricular ejection fraction (LVEF) 20-25%.  Preventative implantable cardiac defibrillator (ICD) implantation is recommended, with possible inclusion of cardiac resynchronization therapy (CRT).  We reviewed the rationale for primary prevention ICD implantation (you may wish to reference a web-based clinical decision tool at https://patientdecisionaid.org/icd/), and reviewed the implant procedure, risks, recovery expectations, and remote monitoring.    We will plan for the following:  - CRT-D implantation - our office will work with you on coordination  - continue your current medications      Please do not hesitate to be in touch with our office at [phone] with any questions that may arise.      Thank you for trusting us with your care,    Tierra Elias MD  Clinical Cardiac Electrophysiology  [facility]  [address]  [place] [address]   Office: [phone]  Fax: [phone]

## 2024-01-22 ENCOUNTER — OFFICE VISIT (OUTPATIENT)
Dept: CARDIOLOGY | Facility: CLINIC | Age: 54
End: 2024-01-22
Attending: NURSE PRACTITIONER
Payer: COMMERCIAL

## 2024-01-22 VITALS
HEART RATE: 86 BPM | DIASTOLIC BLOOD PRESSURE: 62 MMHG | WEIGHT: 214 LBS | RESPIRATION RATE: 16 BRPM | BODY MASS INDEX: 36.73 KG/M2 | SYSTOLIC BLOOD PRESSURE: 124 MMHG

## 2024-01-22 DIAGNOSIS — I50.20 HEART FAILURE WITH REDUCED EJECTION FRACTION (H): Primary | ICD-10-CM

## 2024-01-22 PROCEDURE — 99214 OFFICE O/P EST MOD 30 MIN: CPT | Performed by: INTERNAL MEDICINE

## 2024-01-22 PROCEDURE — G2211 COMPLEX E/M VISIT ADD ON: HCPCS | Performed by: INTERNAL MEDICINE

## 2024-01-22 NOTE — LETTER
1/22/2024    Reymundo Avila MD  [address]  [place] [address]    RE: Eva Parikh       Dear Colleague,     I had the pleasure of seeing Eva Parikh in the [facility].    HEART CARE NOTE          Assessment/Recommendations     1. Severe Biventricular failure c/b severe ADHF  Assessment / Plan  NICM; Tolerating oral diuretic regimen - no changes to regimen at this time  Continue to monitor renal function/repeat BMP, UOP and hemodynamics closely   GDMT as detailed below; plan for CRT-D with Dr. Elias in February     Current Pharmacotherapy AHA Guideline-Directed Medical Therapy   Sacubitril- valsartan 24-26 mg BID Lisinopril 20 mg twice daily   Metoprolol succinate 25 mg daily Carvedilol 25 mg twice daily   Spironolactone not started Spironolactone 25 mg once daily   Hydralazine NA Hydralazine 100 mg three times daily   Isosorbide dinitrate NA Isosorbide dinitrate 40 mg three times daily   SGLT2 inhibitor:Empagliflozin - 10 mg daily Dapagliflozin or Empagliflozin 10 mg daily      2. Valvular heart disease  Assessment / Plan  Moderate MR- routine imagine surveillance indicated    3. SAM  Assessment / Plan  CPAP prescribed, but was recalled      35 minutes spent reviewing prior records (including documentation, laboratory studies, cardiac testing/imaging), history and physical exam, planning, and subsequent documentation.      The longitudinal plan of care for HFrEF was addressed during this visit. Due to the added complexity in care, I will continue to support Ms. Eva Parikh in the subsequent management of this condition(s) and with the ongoing continuity of care of this condition(s).      History of Present Illness/Subjective    Ms. Eva Parikh is a 52 year old female with a PMHx significant for (per Dr. Espino's note) COPD/asthma overlap syndrome, obstructive sleep apnea not using her CPAP with no previous history of cardiac disease found to be in  new onset ADHF.     Today, Mrs. Parikh denies acute cardiac events or complaints; Management plan as detailed above     ECG: personally reviewed; sinus tachycardia.     ECHO (personnaly Reviewed):   1. Left ventricular function is decreased. The ejection fraction is 15-20%  (severely reduced). There is severe global hypokinesia of the left ventricle.  2. The right ventricle is normal size. Mildly decreased right ventricular  systolic function  3. The left atrium is moderately dilated.  4. There is moderate (2+) mitral regurgitation. The mitral regurgitant jet is  eccentrically directed.  5. High RA pressure estimated at 15 mmHg or greater.    Lab results: personally reviewed January 22, 2024; notable for resolving AILYN    Medical history and pertinent documents reviewed in Care Everywhere please where applicable see details above        Physical Examination Review of Systems   /62 (BP Location: Right arm, Patient Position: Sitting, Cuff Size: Adult Large)   Pulse 86   Resp 16   Wt 97.1 kg (214 lb)   BMI 36.73 kg/m    Body mass index is 36.73 kg/m .  Wt Readings from Last 3 Encounters:   01/22/24 97.1 kg (214 lb)   01/10/24 96.6 kg (213 lb)   12/13/23 98.4 kg (217 lb)     General Appearance:   no distress, normal body habitus   ENT/Mouth: membranes moist, no oral lesions or bleeding gums.      EYES:  no scleral icterus, normal conjunctivae   Neck: no carotid bruits or thyromegaly   Chest/Lungs:   lungs are clear to auscultation, no rales or wheezing, equal chest wall expansion    Cardiovascular:   Regular. Normal first and second heart sounds with  + MELANIE; no rubs, or gallops; the carotid, radial and posterior tibial pulses are intact, no JVD or LE  edema bilaterally    Abdomen:  no organomegaly, masses, bruits, or tenderness; bowel sounds are present   Extremities: no cyanosis or clubbing   Skin: no xanthelasma, warm.    Neurologic: NAD     Psychiatric: alert and oriented x3, calm     A complete 10 systems  ROS was reviewed  And is negative except what is listed in the HPI.          Medical History  Surgical History Family History Social History   Past Medical History:   Diagnosis Date    Acute hypoxemic respiratory failure (H) 2023    Anxiety     Arthritis     Asthma     COPD (chronic obstructive pulmonary disease) (H)     Depression     Heart failure with reduced ejection fraction (H)     Hepatic cyst 10/30/2017    New onset of congestive heart failure (H) 2023    Non-ischemic cardiomyopathy (H)     Obese     SAM (obstructive sleep apnea)     Sleep apnea     Past Surgical History:   Procedure Laterality Date    ADENOIDECTOMY      TONSILLECTOMY      no family history of premature coronary artery disease Social History     Socioeconomic History    Marital status: Single     Spouse name: Not on file    Number of children: 1    Years of education: HS    Highest education level: Not on file   Occupational History    Not on file   Tobacco Use    Smoking status: Former     Packs/day: 1     Types: Cigarettes     Quit date: 2020     Years since quittin.0    Smokeless tobacco: Never    Tobacco comments:     Decreased from 2 ppd to 1 ppd since May 2014   Substance and Sexual Activity    Alcohol use: No    Drug use: No    Sexual activity: Not Currently   Other Topics Concern    Not on file   Social History Narrative    Lives on her own.     Social Determinants of Health     Financial Resource Strain: Not on file   Food Insecurity: Not on file   Transportation Needs: Not on file   Physical Activity: Not on file   Stress: Not on file   Social Connections: Not on file   Interpersonal Safety: Not on file   Housing Stability: Not on file           Lab Results    Chemistry/lipid CBC Cardiac Enzymes/BNP/TSH/INR   Lab Results   Component Value Date    CHOL 154 2023    HDL 28 (L) 2023    TRIG 166 (H) 2023    BUN 15.2 2023     2023    CO2 24 2023    Lab Results   Component  Value Date    WBC 7.7 08/27/2023    HGB 12.2 08/27/2023    HCT 39.6 08/27/2023    MCV 91 08/27/2023     09/01/2023    Lab Results   Component Value Date    TROPONINI 0.02 01/01/2020    BNP 16 01/01/2020    TSH 0.45 08/27/2023     Lab Results   Component Value Date    TROPONINI 0.02 01/01/2020          Weight:    Wt Readings from Last 3 Encounters:   01/22/24 97.1 kg (214 lb)   01/10/24 96.6 kg (213 lb)   12/13/23 98.4 kg (217 lb)       Allergies  Allergies   Allergen Reactions    Amoxicillin Unknown     As kid had mold test and told allergic     Mold [Molds & Smuts] Unknown    Mold/Mildew [Molds & Smuts] Unknown    Other Environmental Allergy Unknown     DUST    Penicillins Unknown     As kid had mold test and told allergic     Pollen [Pollen Extract] Unknown         Surgical History  Past Surgical History:   Procedure Laterality Date    ADENOIDECTOMY      TONSILLECTOMY         Social History  Tobacco:   History   Smoking Status    Former    Packs/day: 1.00    Types: Cigarettes    Quit date: 1/1/2020   Smokeless Tobacco    Never    Alcohol:   Social History    Substance and Sexual Activity      Alcohol use: No   Illicit Drugs:   History   Drug Use No       Family History  Family History   Problem Relation Age of Onset    Diabetes Mother     Thyroid Disease Mother     Heart Disease Mother     Mental Illness Mother     Diabetes Maternal Grandmother     Heart Disease Maternal Grandmother     Bipolar Disorder Sister           Lauren Vázquez MD on 1/22/2024      cc: Reymundo Avila      Thank you for allowing me to participate in the care of your patient.      Sincerely,     Lauren Vázquez MD     [facility]  cc:   Katherine Pearce, JUAN CNP  [address], [address]  [place] [address]

## 2024-01-22 NOTE — PROGRESS NOTES
HEART CARE NOTE          Assessment/Recommendations     1. Severe Biventricular failure c/b severe ADHF  Assessment / Plan  NICM; Tolerating oral diuretic regimen - no changes to regimen at this time  Continue to monitor renal function/repeat BMP, UOP and hemodynamics closely   GDMT as detailed below; plan for CRT-D with Dr. Elias in February     Current Pharmacotherapy AHA Guideline-Directed Medical Therapy   Sacubitril- valsartan 24-26 mg BID Lisinopril 20 mg twice daily   Metoprolol succinate 25 mg daily Carvedilol 25 mg twice daily   Spironolactone not started Spironolactone 25 mg once daily   Hydralazine NA Hydralazine 100 mg three times daily   Isosorbide dinitrate NA Isosorbide dinitrate 40 mg three times daily   SGLT2 inhibitor:Empagliflozin - 10 mg daily Dapagliflozin or Empagliflozin 10 mg daily      2. Valvular heart disease  Assessment / Plan  Moderate MR- routine imagine surveillance indicated    3. SAM  Assessment / Plan  CPAP prescribed, but was recalled      35 minutes spent reviewing prior records (including documentation, laboratory studies, cardiac testing/imaging), history and physical exam, planning, and subsequent documentation.      The longitudinal plan of care for HFrEF was addressed during this visit. Due to the added complexity in care, I will continue to support Ms. Eva Parikh in the subsequent management of this condition(s) and with the ongoing continuity of care of this condition(s).      History of Present Illness/Subjective    Ms. Eva Parikh is a 52 year old female with a PMHx significant for (per Dr. Espino's note) COPD/asthma overlap syndrome, obstructive sleep apnea not using her CPAP with no previous history of cardiac disease found to be in new onset ADHF.     Today, Mrs. Parikh denies acute cardiac events or complaints; Management plan as detailed above     ECG: personally reviewed; sinus tachycardia.     ECHO (personnaly Reviewed):   1. Left ventricular  function is decreased. The ejection fraction is 15-20%  (severely reduced). There is severe global hypokinesia of the left ventricle.  2. The right ventricle is normal size. Mildly decreased right ventricular  systolic function  3. The left atrium is moderately dilated.  4. There is moderate (2+) mitral regurgitation. The mitral regurgitant jet is  eccentrically directed.  5. High RA pressure estimated at 15 mmHg or greater.    Lab results: personally reviewed January 22, 2024; notable for resolving AILYN    Medical history and pertinent documents reviewed in Care Everywhere please where applicable see details above        Physical Examination Review of Systems   /62 (BP Location: Right arm, Patient Position: Sitting, Cuff Size: Adult Large)   Pulse 86   Resp 16   Wt 97.1 kg (214 lb)   BMI 36.73 kg/m    Body mass index is 36.73 kg/m .  Wt Readings from Last 3 Encounters:   01/22/24 97.1 kg (214 lb)   01/10/24 96.6 kg (213 lb)   12/13/23 98.4 kg (217 lb)     General Appearance:   no distress, normal body habitus   ENT/Mouth: membranes moist, no oral lesions or bleeding gums.      EYES:  no scleral icterus, normal conjunctivae   Neck: no carotid bruits or thyromegaly   Chest/Lungs:   lungs are clear to auscultation, no rales or wheezing, equal chest wall expansion    Cardiovascular:   Regular. Normal first and second heart sounds with  + MELANIE; no rubs, or gallops; the carotid, radial and posterior tibial pulses are intact, no JVD or LE  edema bilaterally    Abdomen:  no organomegaly, masses, bruits, or tenderness; bowel sounds are present   Extremities: no cyanosis or clubbing   Skin: no xanthelasma, warm.    Neurologic: NAD     Psychiatric: alert and oriented x3, calm     A complete 10 systems ROS was reviewed  And is negative except what is listed in the HPI.          Medical History  Surgical History Family History Social History   Past Medical History:   Diagnosis Date    Acute hypoxemic respiratory failure  (H) 2023    Anxiety     Arthritis     Asthma     COPD (chronic obstructive pulmonary disease) (H)     Depression     Heart failure with reduced ejection fraction (H)     Hepatic cyst 10/30/2017    New onset of congestive heart failure (H) 2023    Non-ischemic cardiomyopathy (H)     Obese     SAM (obstructive sleep apnea)     Sleep apnea     Past Surgical History:   Procedure Laterality Date    ADENOIDECTOMY      TONSILLECTOMY      no family history of premature coronary artery disease Social History     Socioeconomic History    Marital status: Single     Spouse name: Not on file    Number of children: 1    Years of education:     Highest education level: Not on file   Occupational History    Not on file   Tobacco Use    Smoking status: Former     Packs/day: 1     Types: Cigarettes     Quit date: 2020     Years since quittin.0    Smokeless tobacco: Never    Tobacco comments:     Decreased from 2 ppd to 1 ppd since May 2014   Substance and Sexual Activity    Alcohol use: No    Drug use: No    Sexual activity: Not Currently   Other Topics Concern    Not on file   Social History Narrative    Lives on her own.     Social Determinants of Health     Financial Resource Strain: Not on file   Food Insecurity: Not on file   Transportation Needs: Not on file   Physical Activity: Not on file   Stress: Not on file   Social Connections: Not on file   Interpersonal Safety: Not on file   Housing Stability: Not on file           Lab Results    Chemistry/lipid CBC Cardiac Enzymes/BNP/TSH/INR   Lab Results   Component Value Date    CHOL 154 2023    HDL 28 (L) 2023    TRIG 166 (H) 2023    BUN 15.2 2023     2023    CO2 24 2023    Lab Results   Component Value Date    WBC 7.7 2023    HGB 12.2 2023    HCT 39.6 2023    MCV 91 2023     2023    Lab Results   Component Value Date    TROPONINI 0.02 2020    BNP 16 2020    TSH 0.45  08/27/2023     Lab Results   Component Value Date    TROPONINI 0.02 01/01/2020          Weight:    Wt Readings from Last 3 Encounters:   01/22/24 97.1 kg (214 lb)   01/10/24 96.6 kg (213 lb)   12/13/23 98.4 kg (217 lb)       Allergies  Allergies   Allergen Reactions    Amoxicillin Unknown     As kid had mold test and told allergic     Mold [Molds & Smuts] Unknown    Mold/Mildew [Molds & Smuts] Unknown    Other Environmental Allergy Unknown     DUST    Penicillins Unknown     As kid had mold test and told allergic     Pollen [Pollen Extract] Unknown         Surgical History  Past Surgical History:   Procedure Laterality Date    ADENOIDECTOMY      TONSILLECTOMY         Social History  Tobacco:   History   Smoking Status    Former    Packs/day: 1.00    Types: Cigarettes    Quit date: 1/1/2020   Smokeless Tobacco    Never    Alcohol:   Social History    Substance and Sexual Activity      Alcohol use: No   Illicit Drugs:   History   Drug Use No       Family History  Family History   Problem Relation Age of Onset    Diabetes Mother     Thyroid Disease Mother     Heart Disease Mother     Mental Illness Mother     Diabetes Maternal Grandmother     Heart Disease Maternal Grandmother     Bipolar Disorder Sister           Lauren Vázquez MD on 1/22/2024      cc: Reymundo Avila

## 2024-02-05 ENCOUNTER — TELEPHONE (OUTPATIENT)
Dept: CARDIOLOGY | Facility: CLINIC | Age: 54
End: 2024-02-05

## 2024-02-05 NOTE — TELEPHONE ENCOUNTER
Pre-Procedure Education    Procedure: CRT-D with Dr Elias on 2/12 with arrival time 7:00 am    COVID: COVID policy- if pt develops COVID like symptoms prior to procedure, he/she would need to complete an at home with a rapid antigen COVID test 1-2 days prior to your procedure date. If COVID + pt is aware the procedure will need to be rescheduled, and to contact CV scheduling as soon as possible    Pre-Op H&P: Completed- Available in Epic    Education:   Contact: Reviewed via phone with pt  Pre-Procedure Instruction: NPO after midnight pre procedure, Defined NPO with pt, Remove all jewelry and leave all valuables at home, Shower prior to arrival, Sedation plan/orders, Transportation requirements and arrangements post procedure, Post-procedure follow up process, Post-procedure restrictions/expectations, and Pre-procedure letter sent- letter tab  Risks:  Internal Cardiac Defibrillator (ICD) Risks  1% Pneumothorax  1-2% Infection, Pocket erosion  1-2% Major bleeding, Hematoma (increased with anticoagulation therapy); 5-10% Minor bleeding  1-2% Acute subclavian vein thrombosis, 10% Chronic subclavian vein thrombosis  < 1% Cardiac perforation, Cardiac tamponade, Arrythmias, Diaphragmatic stimulation  < 2% Lead dislodgment, < 1% Lead fracture or Generator malfunction  < 0.1% Death  < 2% Tricuspid valve dysfunction  If external defibrillation is needed, 25% risk for superficial skin irritation  < 5% Need for ICD system revision  5% Inappropriate shock  Risks associated with general anesthesia will be addressed by the Anesthesiology Department.  Risk for electromagnetic interference (ZEINAB), psychosocial aspects of having an ICD, and limitations to operating a motor vehicle as directed by the electrophysiologist dependent on specific patient criteria.    Biventricular Implants Risks (CRT) Risks  In addition to standard risks for ICD or PPM implant, there is:  10% Inability to place Left Ventricular Lead (LV)  10% Lead  dislodgment of LV lead, patient counseled regarding options if transvenous LV lead placement is not feasible  1% Venous rupture, Cardiac tamponade  5-10% Phrenic stimulation      Medication:   Instructions regarding anticoagulants: Pt not on AC- N/A  Instructions regarding antiarrhythmic medication: N/A for this type of procedure; N/A  Instructions given to pt regarding diuretics medication: Hold oral diuretics AM of procedure  Instructions given to pt regarding DM/GLP-1 medication:   DM- Hold all oral diabetic medications, short acting insulin the morning of the procedure, and take 1/2 of pts scheduled doses of long acting insulin the PM prior and/or AM of procedure  GLP-1- None  Instructions for medication, other than anticoagulants and antiarrhythmics listed above, given to pt:  hold bumex/jardiance 24hours prior      Important patient information for staff: None    2/5/2024 4:20 PM  Ann Ramirez RN

## 2024-02-12 ENCOUNTER — TRANSCRIBE ORDERS (OUTPATIENT)
Dept: CARDIOLOGY | Facility: CLINIC | Age: 54
End: 2024-02-12

## 2024-02-12 ENCOUNTER — ANESTHESIA EVENT (OUTPATIENT)
Dept: CARDIOLOGY | Facility: HOSPITAL | Age: 54
End: 2024-02-12
Payer: COMMERCIAL

## 2024-02-12 ENCOUNTER — HOSPITAL ENCOUNTER (OUTPATIENT)
Facility: HOSPITAL | Age: 54
Discharge: HOME | End: 2024-02-12
Attending: INTERNAL MEDICINE | Admitting: INTERNAL MEDICINE
Payer: COMMERCIAL

## 2024-02-12 ENCOUNTER — ANESTHESIA (OUTPATIENT)
Dept: CARDIOLOGY | Facility: HOSPITAL | Age: 54
End: 2024-02-12
Payer: COMMERCIAL

## 2024-02-12 ENCOUNTER — APPOINTMENT (OUTPATIENT)
Dept: RADIOLOGY | Facility: HOSPITAL | Age: 54
End: 2024-02-12
Attending: INTERNAL MEDICINE
Payer: COMMERCIAL

## 2024-02-12 VITALS
SYSTOLIC BLOOD PRESSURE: 110 MMHG | OXYGEN SATURATION: 93 % | RESPIRATION RATE: 25 BRPM | TEMPERATURE: 98.3 F | DIASTOLIC BLOOD PRESSURE: 62 MMHG | HEART RATE: 104 BPM

## 2024-02-12 DIAGNOSIS — I42.8 NONISCHEMIC CARDIOMYOPATHY (H): ICD-10-CM

## 2024-02-12 DIAGNOSIS — Z95.810 ICD (IMPLANTABLE CARDIOVERTER-DEFIBRILLATOR) IN PLACE: Primary | ICD-10-CM

## 2024-02-12 DIAGNOSIS — I50.20 HEART FAILURE WITH REDUCED EJECTION FRACTION (H): ICD-10-CM

## 2024-02-12 LAB
ANION GAP SERPL CALCULATED.3IONS-SCNC: 10 MMOL/L (ref 7–15)
ATRIAL RATE - MUSE: 95 BPM
BUN SERPL-MCNC: 16 MG/DL (ref 6–20)
CALCIUM SERPL-MCNC: 9.2 MG/DL (ref 8.6–10)
CHLORIDE SERPL-SCNC: 104 MMOL/L (ref 98–107)
CREAT SERPL-MCNC: 0.96 MG/DL (ref 0.51–0.95)
DEPRECATED HCO3 PLAS-SCNC: 27 MMOL/L (ref 22–29)
DIASTOLIC BLOOD PRESSURE - MUSE: NORMAL MMHG
EGFRCR SERPLBLD CKD-EPI 2021: 70 ML/MIN/1.73M2
ERYTHROCYTE [DISTWIDTH] IN BLOOD BY AUTOMATED COUNT: 13 % (ref 10–15)
GLUCOSE SERPL-MCNC: 104 MG/DL (ref 70–99)
HCT VFR BLD AUTO: 45.6 % (ref 35–47)
HGB BLD-MCNC: 14.9 G/DL (ref 11.7–15.7)
INTERPRETATION ECG - MUSE: NORMAL
MCH RBC QN AUTO: 29.3 PG (ref 26.5–33)
MCHC RBC AUTO-ENTMCNC: 32.7 G/DL (ref 31.5–36.5)
MCV RBC AUTO: 90 FL (ref 78–100)
P AXIS - MUSE: 64 DEGREES
PLATELET # BLD AUTO: 259 10E3/UL (ref 150–450)
POTASSIUM SERPL-SCNC: 4.2 MMOL/L (ref 3.4–5.3)
PR INTERVAL - MUSE: 142 MS
QRS DURATION - MUSE: 130 MS
QT - MUSE: 490 MS
QTC - MUSE: 615 MS
R AXIS - MUSE: 2 DEGREES
RBC # BLD AUTO: 5.09 10E6/UL (ref 3.8–5.2)
SODIUM SERPL-SCNC: 141 MMOL/L (ref 135–145)
SYSTOLIC BLOOD PRESSURE - MUSE: NORMAL MMHG
T AXIS - MUSE: -55 DEGREES
VENTRICULAR RATE- MUSE: 95 BPM
WBC # BLD AUTO: 8 10E3/UL (ref 4–11)

## 2024-02-12 PROCEDURE — 85018 HEMOGLOBIN: CPT | Performed by: INTERNAL MEDICINE

## 2024-02-12 PROCEDURE — 93010 ELECTROCARDIOGRAM REPORT: CPT | Performed by: GENERAL ACUTE CARE HOSPITAL

## 2024-02-12 PROCEDURE — 33225 L VENTRIC PACING LEAD ADD-ON: CPT | Performed by: INTERNAL MEDICINE

## 2024-02-12 PROCEDURE — 250N000011 HC RX IP 250 OP 636

## 2024-02-12 PROCEDURE — 250N000011 HC RX IP 250 OP 636: Performed by: NURSE ANESTHETIST, CERTIFIED REGISTERED

## 2024-02-12 PROCEDURE — C1730 CATH, EP, 19 OR FEW ELECT: HCPCS | Performed by: INTERNAL MEDICINE

## 2024-02-12 PROCEDURE — 71045 X-RAY EXAM CHEST 1 VIEW: CPT

## 2024-02-12 PROCEDURE — C1779 LEAD, PMKR, TRANSVENOUS VDD: HCPCS | Performed by: INTERNAL MEDICINE

## 2024-02-12 PROCEDURE — 36415 COLL VENOUS BLD VENIPUNCTURE: CPT | Performed by: INTERNAL MEDICINE

## 2024-02-12 PROCEDURE — 370N000017 HC ANESTHESIA TECHNICAL FEE, PER MIN: Performed by: INTERNAL MEDICINE

## 2024-02-12 PROCEDURE — 255N000002 HC RX 255 OP 636: Performed by: INTERNAL MEDICINE

## 2024-02-12 PROCEDURE — C1882 AICD, OTHER THAN SING/DUAL: HCPCS | Performed by: INTERNAL MEDICINE

## 2024-02-12 PROCEDURE — 999N000054 HC STATISTIC EKG NON-CHARGEABLE

## 2024-02-12 PROCEDURE — 93005 ELECTROCARDIOGRAM TRACING: CPT

## 2024-02-12 PROCEDURE — C1894 INTRO/SHEATH, NON-LASER: HCPCS | Performed by: INTERNAL MEDICINE

## 2024-02-12 PROCEDURE — 272N000001 HC OR GENERAL SUPPLY STERILE: Performed by: INTERNAL MEDICINE

## 2024-02-12 PROCEDURE — 250N000009 HC RX 250: Performed by: NURSE ANESTHETIST, CERTIFIED REGISTERED

## 2024-02-12 PROCEDURE — 250N000009 HC RX 250: Performed by: INTERNAL MEDICINE

## 2024-02-12 PROCEDURE — C1900 LEAD, CORONARY VENOUS: HCPCS | Performed by: INTERNAL MEDICINE

## 2024-02-12 PROCEDURE — 33249 INSJ/RPLCMT DEFIB W/LEAD(S): CPT | Performed by: INTERNAL MEDICINE

## 2024-02-12 PROCEDURE — 80048 BASIC METABOLIC PNL TOTAL CA: CPT | Performed by: INTERNAL MEDICINE

## 2024-02-12 PROCEDURE — 250N000011 HC RX IP 250 OP 636: Performed by: ANESTHESIOLOGY

## 2024-02-12 PROCEDURE — 258N000003 HC RX IP 258 OP 636: Performed by: ANESTHESIOLOGY

## 2024-02-12 PROCEDURE — 250N000011 HC RX IP 250 OP 636: Performed by: INTERNAL MEDICINE

## 2024-02-12 PROCEDURE — 258N000003 HC RX IP 258 OP 636

## 2024-02-12 PROCEDURE — 258N000003 HC RX IP 258 OP 636: Performed by: NURSE ANESTHETIST, CERTIFIED REGISTERED

## 2024-02-12 PROCEDURE — C1898 LEAD, PMKR, OTHER THAN TRANS: HCPCS | Performed by: INTERNAL MEDICINE

## 2024-02-12 PROCEDURE — 258N000003 HC RX IP 258 OP 636: Performed by: NURSE PRACTITIONER

## 2024-02-12 DEVICE — DEFIB ICD COBALT XT TITANIUM 74X51X13MM DTPA2QQ: Type: IMPLANTABLE DEVICE | Site: CHEST  WALL | Status: FUNCTIONAL

## 2024-02-12 DEVICE — LEAD SPRINT QUATTRO SECURE S 55 6935M55: Type: IMPLANTABLE DEVICE | Site: HEART | Status: FUNCTIONAL

## 2024-02-12 DEVICE — IMPLANTABLE DEVICE: Type: IMPLANTABLE DEVICE | Site: CHEST  WALL | Status: FUNCTIONAL

## 2024-02-12 DEVICE — IMP LEAD PACING BIPOLAR CAPSUREFIX NOVUS 45CM 5076-45: Type: IMPLANTABLE DEVICE | Site: HEART | Status: FUNCTIONAL

## 2024-02-12 RX ORDER — HYDROMORPHONE HYDROCHLORIDE 1 MG/ML
0.2 INJECTION, SOLUTION INTRAMUSCULAR; INTRAVENOUS; SUBCUTANEOUS EVERY 5 MIN PRN
Status: DISCONTINUED | OUTPATIENT
Start: 2024-02-12 | End: 2024-02-12 | Stop reason: HOSPADM

## 2024-02-12 RX ORDER — DEXAMETHASONE SODIUM PHOSPHATE 10 MG/ML
INJECTION, SOLUTION INTRAMUSCULAR; INTRAVENOUS PRN
Status: DISCONTINUED | OUTPATIENT
Start: 2024-02-12 | End: 2024-02-12

## 2024-02-12 RX ORDER — NALOXONE HYDROCHLORIDE 0.4 MG/ML
0.4 INJECTION, SOLUTION INTRAMUSCULAR; INTRAVENOUS; SUBCUTANEOUS
Status: DISCONTINUED | OUTPATIENT
Start: 2024-02-12 | End: 2024-02-12 | Stop reason: HOSPADM

## 2024-02-12 RX ORDER — HYDROMORPHONE HYDROCHLORIDE 1 MG/ML
0.4 INJECTION, SOLUTION INTRAMUSCULAR; INTRAVENOUS; SUBCUTANEOUS EVERY 5 MIN PRN
Status: DISCONTINUED | OUTPATIENT
Start: 2024-02-12 | End: 2024-02-12 | Stop reason: HOSPADM

## 2024-02-12 RX ORDER — SODIUM CHLORIDE, SODIUM LACTATE, POTASSIUM CHLORIDE, CALCIUM CHLORIDE 600; 310; 30; 20 MG/100ML; MG/100ML; MG/100ML; MG/100ML
INJECTION, SOLUTION INTRAVENOUS CONTINUOUS
Status: DISCONTINUED | OUTPATIENT
Start: 2024-02-12 | End: 2024-02-12 | Stop reason: HOSPADM

## 2024-02-12 RX ORDER — ONDANSETRON 4 MG/1
4 TABLET, ORALLY DISINTEGRATING ORAL EVERY 30 MIN PRN
Status: DISCONTINUED | OUTPATIENT
Start: 2024-02-12 | End: 2024-02-12 | Stop reason: HOSPADM

## 2024-02-12 RX ORDER — ONDANSETRON 2 MG/ML
4 INJECTION INTRAMUSCULAR; INTRAVENOUS EVERY 30 MIN PRN
Status: DISCONTINUED | OUTPATIENT
Start: 2024-02-12 | End: 2024-02-12 | Stop reason: HOSPADM

## 2024-02-12 RX ORDER — CEFAZOLIN SODIUM/WATER 2 G/20 ML
2 SYRINGE (ML) INTRAVENOUS
Status: DISCONTINUED | OUTPATIENT
Start: 2024-02-12 | End: 2024-02-12

## 2024-02-12 RX ORDER — FENTANYL CITRATE 50 UG/ML
50 INJECTION, SOLUTION INTRAMUSCULAR; INTRAVENOUS EVERY 5 MIN PRN
Status: DISCONTINUED | OUTPATIENT
Start: 2024-02-12 | End: 2024-02-12 | Stop reason: HOSPADM

## 2024-02-12 RX ORDER — IODIXANOL 320 MG/ML
INJECTION, SOLUTION INTRAVASCULAR
Status: DISCONTINUED | OUTPATIENT
Start: 2024-02-12 | End: 2024-02-12 | Stop reason: HOSPADM

## 2024-02-12 RX ORDER — SODIUM CHLORIDE 9 MG/ML
100 INJECTION, SOLUTION INTRAVENOUS CONTINUOUS
Status: DISCONTINUED | OUTPATIENT
Start: 2024-02-12 | End: 2024-02-12 | Stop reason: HOSPADM

## 2024-02-12 RX ORDER — ACETAMINOPHEN 325 MG/1
650 TABLET ORAL EVERY 4 HOURS PRN
Status: DISCONTINUED | OUTPATIENT
Start: 2024-02-12 | End: 2024-02-12 | Stop reason: HOSPADM

## 2024-02-12 RX ORDER — NALOXONE HYDROCHLORIDE 0.4 MG/ML
0.2 INJECTION, SOLUTION INTRAMUSCULAR; INTRAVENOUS; SUBCUTANEOUS
Status: DISCONTINUED | OUTPATIENT
Start: 2024-02-12 | End: 2024-02-12 | Stop reason: HOSPADM

## 2024-02-12 RX ORDER — FENTANYL CITRATE 50 UG/ML
INJECTION, SOLUTION INTRAMUSCULAR; INTRAVENOUS PRN
Status: DISCONTINUED | OUTPATIENT
Start: 2024-02-12 | End: 2024-02-12

## 2024-02-12 RX ORDER — FENTANYL CITRATE 50 UG/ML
25 INJECTION, SOLUTION INTRAMUSCULAR; INTRAVENOUS EVERY 5 MIN PRN
Status: DISCONTINUED | OUTPATIENT
Start: 2024-02-12 | End: 2024-02-12 | Stop reason: HOSPADM

## 2024-02-12 RX ORDER — PROPOFOL 10 MG/ML
INJECTION, EMULSION INTRAVENOUS PRN
Status: DISCONTINUED | OUTPATIENT
Start: 2024-02-12 | End: 2024-02-12

## 2024-02-12 RX ORDER — LIDOCAINE 40 MG/G
CREAM TOPICAL
Status: DISCONTINUED | OUTPATIENT
Start: 2024-02-12 | End: 2024-02-12 | Stop reason: HOSPADM

## 2024-02-12 RX ORDER — BUPIVACAINE HYDROCHLORIDE 2.5 MG/ML
INJECTION, SOLUTION INFILTRATION; PERINEURAL
Status: DISCONTINUED | OUTPATIENT
Start: 2024-02-12 | End: 2024-02-12 | Stop reason: HOSPADM

## 2024-02-12 RX ORDER — SODIUM CHLORIDE, SODIUM LACTATE, POTASSIUM CHLORIDE, CALCIUM CHLORIDE 600; 310; 30; 20 MG/100ML; MG/100ML; MG/100ML; MG/100ML
INJECTION, SOLUTION INTRAVENOUS CONTINUOUS PRN
Status: DISCONTINUED | OUTPATIENT
Start: 2024-02-12 | End: 2024-02-12

## 2024-02-12 RX ORDER — CEFAZOLIN SODIUM/WATER 2 G/20 ML
2 SYRINGE (ML) INTRAVENOUS
Status: DISCONTINUED | OUTPATIENT
Start: 2024-02-12 | End: 2024-02-12 | Stop reason: HOSPADM

## 2024-02-12 RX ORDER — BUPIVACAINE HYDROCHLORIDE 2.5 MG/ML
INJECTION, SOLUTION EPIDURAL; INFILTRATION; INTRACAUDAL
Status: DISCONTINUED | OUTPATIENT
Start: 2024-02-12 | End: 2024-02-12 | Stop reason: HOSPADM

## 2024-02-12 RX ORDER — CEFAZOLIN SODIUM/WATER 2 G/20 ML
SYRINGE (ML) INTRAVENOUS PRN
Status: DISCONTINUED | OUTPATIENT
Start: 2024-02-12 | End: 2024-02-12

## 2024-02-12 RX ORDER — PROPOFOL 10 MG/ML
INJECTION, EMULSION INTRAVENOUS CONTINUOUS PRN
Status: DISCONTINUED | OUTPATIENT
Start: 2024-02-12 | End: 2024-02-12

## 2024-02-12 RX ORDER — LIDOCAINE HYDROCHLORIDE 10 MG/ML
INJECTION, SOLUTION INFILTRATION; PERINEURAL PRN
Status: DISCONTINUED | OUTPATIENT
Start: 2024-02-12 | End: 2024-02-12

## 2024-02-12 RX ADMIN — PHENYLEPHRINE HYDROCHLORIDE 100 MCG: 10 INJECTION INTRAVENOUS at 15:16

## 2024-02-12 RX ADMIN — SODIUM CHLORIDE 100 ML/HR: 9 INJECTION, SOLUTION INTRAVENOUS at 12:31

## 2024-02-12 RX ADMIN — PROPOFOL 50 MG: 10 INJECTION, EMULSION INTRAVENOUS at 14:17

## 2024-02-12 RX ADMIN — FENTANYL CITRATE 50 MCG: 50 INJECTION INTRAMUSCULAR; INTRAVENOUS at 14:17

## 2024-02-12 RX ADMIN — PHENYLEPHRINE HYDROCHLORIDE 100 MCG: 10 INJECTION INTRAVENOUS at 16:15

## 2024-02-12 RX ADMIN — PHENYLEPHRINE HYDROCHLORIDE 100 MCG: 10 INJECTION INTRAVENOUS at 15:04

## 2024-02-12 RX ADMIN — LIDOCAINE HYDROCHLORIDE 5 ML: 10 INJECTION, SOLUTION INFILTRATION; PERINEURAL at 14:17

## 2024-02-12 RX ADMIN — DEXAMETHASONE SODIUM PHOSPHATE 5 MG: 10 INJECTION, SOLUTION INTRAMUSCULAR; INTRAVENOUS at 14:23

## 2024-02-12 RX ADMIN — PROPOFOL 100 MCG/KG/MIN: 10 INJECTION, EMULSION INTRAVENOUS at 14:18

## 2024-02-12 RX ADMIN — SODIUM CHLORIDE, POTASSIUM CHLORIDE, SODIUM LACTATE AND CALCIUM CHLORIDE: 600; 310; 30; 20 INJECTION, SOLUTION INTRAVENOUS at 14:12

## 2024-02-12 RX ADMIN — Medication 2 G: at 14:23

## 2024-02-12 RX ADMIN — PHENYLEPHRINE HYDROCHLORIDE 100 MCG: 10 INJECTION INTRAVENOUS at 14:52

## 2024-02-12 ASSESSMENT — COPD QUESTIONNAIRES: COPD: 1

## 2024-02-12 ASSESSMENT — ACTIVITIES OF DAILY LIVING (ADL)
ADLS_ACUITY_SCORE: 38

## 2024-02-12 NOTE — Clinical Note
Prepped: left chest and left neck. Prepped with: ChloraPrep. The patient was draped. .Pre-procedure site marking:Insertion site not predetermined

## 2024-02-12 NOTE — ANESTHESIA CARE TRANSFER NOTE
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    Anesthesia Type:   MAC     Note:    Oropharynx: oropharynx clear of all foreign objects and spontaneously breathing  Level of Consciousness: awake  Oxygen Supplementation: room air    Independent Airway: airway patency satisfactory and stable  Dentition: dentition unchanged  Vital Signs Stable: post-procedure vital signs reviewed and stable  Report to RN Given: handoff report given  Patient transferred to: Cardiac Special Care          Vitals:  Vitals Value Taken Time   /63 02/12/24 1625   Temp 97.7    Pulse 86 02/12/24 1628   Resp 16 02/12/24 1628   SpO2 91 % 02/12/24 1628   Vitals shown include unfiled device data.    Electronically Signed By: JUAN Cortez CRNA  February 12, 2024  4:30 PM

## 2024-02-12 NOTE — ANESTHESIA PREPROCEDURE EVALUATION
Anesthesia Pre-Procedure Evaluation    Patient: Eva Parikh   MRN: [id number] : 1970        Procedure : Procedure(s):  Implantable Cardioverter Defibrillator Device & Lead Implant - HIS Lead Biventricular          Past Medical History:   Diagnosis Date    Acute hypoxemic respiratory failure (H) 2023    Anxiety     Arthritis     Asthma     COPD (chronic obstructive pulmonary disease) (H)     Depression     Heart failure with reduced ejection fraction (H)     Hepatic cyst 10/30/2017    New onset of congestive heart failure (H) 2023    Non-ischemic cardiomyopathy (H)     Obese     SAM (obstructive sleep apnea)     Sleep apnea       Past Surgical History:   Procedure Laterality Date    ADENOIDECTOMY      TONSILLECTOMY        Allergies   Allergen Reactions    Amoxicillin Unknown     As kid had mold test and told allergic     Mold [Molds & Smuts] Unknown    Mold/Mildew [Molds & Smuts] Unknown    Other Environmental Allergy Unknown     DUST    Penicillins Unknown     As kid had mold test and told allergic     Pollen [Pollen Extract] Unknown      Social History     Tobacco Use    Smoking status: Former     Packs/day: 1     Types: Cigarettes     Quit date: 2020     Years since quittin.1    Smokeless tobacco: Never    Tobacco comments:     Decreased from 2 ppd to 1 ppd since May 2014   Substance Use Topics    Alcohol use: No      Wt Readings from Last 1 Encounters:   24 97.1 kg (214 lb)        Anesthesia Evaluation            ROS/MED HX  ENT/Pulmonary:     (+) sleep apnea, uses CPAP,                    asthma    COPD,              Neurologic:       Cardiovascular: Comment: Interpretation Summary     The left ventricle is moderately dilated.  Left ventricular function is decreased. The ejection fraction is 20-25%  (severely reduced).  There is severe global hypokinesia of the left ventricle.  IVC diameter <2.1 cm collapsing >50% with sniff suggests a normal RA pressure  of 3  "mmHg.  Normal right ventricle size and systolic function.  No hemodynamically significant valvular abnormalities on 2D or color flow  imaging.      (+)  - -   -  - -      CHF                                METS/Exercise Tolerance: 1 - Eating, dressing    Hematologic:       Musculoskeletal:       GI/Hepatic:     (+) GERD,            liver disease,       Renal/Genitourinary:       Endo:     (+)               Obesity,       Psychiatric/Substance Use:       Infectious Disease:       Malignancy:       Other:            Physical Exam    Airway  airway exam normal      Mallampati: II   TM distance: > 3 FB   Neck ROM: full   Mouth opening: > 3 cm    Respiratory Devices and Support         Dental       (+) Multiple visibly decayed, broken teeth      Cardiovascular          Rhythm and rate: irregular and normal     Pulmonary           (+) decreased breath sounds           OUTSIDE LABS:  CBC:   Lab Results   Component Value Date    WBC 8.0 02/12/2024    WBC 7.7 08/27/2023    HGB 14.9 02/12/2024    HGB 12.2 08/27/2023    HCT 45.6 02/12/2024    HCT 39.6 08/27/2023     02/12/2024     09/01/2023     BMP:   Lab Results   Component Value Date     02/12/2024     09/08/2023    POTASSIUM 4.2 02/12/2024    POTASSIUM 4.1 09/08/2023    CHLORIDE 104 02/12/2024    CHLORIDE 100 09/08/2023    CO2 27 02/12/2024    CO2 24 09/08/2023    BUN 16.0 02/12/2024    BUN 15.2 09/08/2023    CR 0.96 (H) 02/12/2024    CR 0.90 09/08/2023     (H) 02/12/2024     (H) 09/08/2023     COAGS: No results found for: \"PTT\", \"INR\", \"FIBR\"  POC: No results found for: \"BGM\", \"HCG\", \"HCGS\"  HEPATIC:   Lab Results   Component Value Date    ALBUMIN 4.0 09/08/2023    PROTTOTAL 6.1 (L) 09/08/2023    ALT 47 09/08/2023    AST 31 09/08/2023    ALKPHOS 72 09/08/2023    BILITOTAL 0.5 09/08/2023     OTHER:   Lab Results   Component Value Date    LACT 2.0 01/01/2020    A1C 6.0 01/02/2020    KANU 9.2 02/12/2024    MAG 1.9 08/26/2023    LIPASE " 27 02/28/2019    TSH 0.45 08/27/2023    CRP <0.1 11/28/2018    SED 2 11/28/2018       Anesthesia Plan    ASA Status:  4    NPO Status:  NPO Appropriate    Anesthesia Type: MAC.     - Reason for MAC: immobility needed         Techniques and Equipment:       - Drips/Meds: Dexmed. infusion, Epinephrine     Consents    Anesthesia Plan(s) and associated risks, benefits, and realistic alternatives discussed. Questions answered and patient/representative(s) expressed understanding.     - Discussed:     - Discussed with:  Patient       - Patient is DNR/DNI Status: No          Postoperative Care    Pain management: IV analgesics.   PONV prophylaxis: Ondansetron (or other 5HT-3), Dexamethasone or Solumedrol     Comments:    Other Comments: Patient is aware she is an extremely high risk patient and wishes to proceed.  Full Code.    MD Madhavi Carbone MD    I have reviewed the pertinent notes and labs in the chart from the past 30 days and (re)examined the patient.  Any updates or changes from those notes are reflected in this note.

## 2024-02-12 NOTE — ANESTHESIA POSTPROCEDURE EVALUATION
Patient: Eva Parikh    Procedure: Procedure(s):  Implantable Cardioverter Defibrillator Device & Lead Implant Biventricular       Anesthesia Type:  MAC    Note:  Disposition: Inpatient   Postop Pain Control: Uneventful            Sign Out: Well controlled pain   PONV: No   Neuro/Psych: Uneventful            Sign Out: Acceptable/Baseline neuro status   Airway/Respiratory: Uneventful            Sign Out: Acceptable/Baseline resp. status   CV/Hemodynamics: Uneventful            Sign Out: Acceptable CV status; No obvious hypovolemia; No obvious fluid overload   Other NRE: NONE   DID A NON-ROUTINE EVENT OCCUR? No           Last vitals:  Vitals Value Taken Time   /63 02/12/24 1700   Temp     Pulse 96 02/12/24 1715   Resp 18 02/12/24 1715   SpO2 94 % 02/12/24 1715   Vitals shown include unfiled device data.    Electronically Signed By: Young Mitchell MD  February 12, 2024  5:17 PM

## 2024-02-12 NOTE — PRE-PROCEDURE
GENERAL PRE-PROCEDURE:   Procedure:  CRT-D implant for indication of severe biventricular heart failure, NICM  Date/Time:  2/12/2024 1:11 PM    Written consent obtained?: Yes    Risks and benefits: Risks, benefits and alternatives were discussed    Consent given by:  Patient  Patient states understanding of procedure being performed: Yes    Patient's understanding of procedure matches consent: Yes    Procedure consent matches procedure scheduled: Yes    Expected level of sedation:  Moderate  Appropriately NPO:  Yes  ASA Class:  4 (NICM, severe biventricular heart failure, moderate MR, SAM, COPD/asthma, Class II obesity; BMI 36.73 kg/m )  Mallampati  :  Grade 2- soft palate, base of uvula, tonsillar pillars, and portion of posterior pharyngeal wall visible  Lungs:  Lungs clear with good breath sounds bilaterally  Heart:  Normal heart sounds and rate  History & Physical reviewed:  History and physical reviewed and updates made (see comment)  H&P Comments:  History & Physical reviewed:  History and physical reviewed and updates made (see comment)  H&P Comments:  Clinically Significant Risk Factors Present on Admission     Cardiovascular : NICM, severe biventricular heart failure, moderate MR, Class II obesity; BMI 36.73 kg/m      Fluid & Electrolyte Disorders : Not present on admission     Gastroenterology : Not present on admission     Hematology/Oncology : Not present on admission     Nephrology : Not present on admission     Neurology : Not present on admission     Pulmonology : SAM, COPD/asthma     Systemic : Not present on admission    Statement of review:  I have reviewed the lab findings, diagnostic data, medications, and the plan for sedation    Statement of review:  I have reviewed the lab findings, diagnostic data, medications, and the plan for sedation

## 2024-02-12 NOTE — Clinical Note
Tested the right ventricular lead. See vendor printout/MD dictation. MDT   Sprint Quattro secure S 6935m-55cm SN [id number] exp 2025-06-28

## 2024-02-12 NOTE — DISCHARGE INSTRUCTIONS
[facility]  Cardiac Electrophysiology  [address]  [place] [address]   Office: [phone]  Fax: [phone]       Cardiac Electrophysiology - Post Pacemaker or Defibrillator Implantation Discharge Instructions      PROCEDURE   Cardiac resynchronization therapy defibrillation (CRT-D) implantation         MEDICATION INSTRUCTIONS   Continue taking your prior to procedure medications.         WOUND CARE   Leave the large overlying dressing in place until 2 days after discharge - this dressing can thereafter be removed by gently pulling off.  Underneath the large dressing will be steri-strips. DO NOT REMOVE these strips; please leave these in place until you are seen in clinic.  DO NOT COVER the site with any bandages or dressings. The site should be kept dry for 7 days - please avoid traditional showers or baths during this time.  Keep the site clean and dry.  No swimming, sauna, or hot tub use until incision is completely healed.         ACTIVITY   It takes approximately 4-6 weeks for your pacemaker/defibrillator leads to settle into place. During this time use extra precaution to avoid dislodging the leads.  Avoid the following with the arm on the side of your device implant:  Raising your arm over your head or stretching behind your back (no hyperflexion)  Pushing or pulling (mowing the lawn, vacuuming)  Lifting anything heavier than 10 pounds or a gallon of milk  Sudden or extreme motions  Be physically active every day.  Moving your arm is important       REMOTE MONITORING   You will receive a remote transmission station to monitor your device from home  Please set the transmitter up as soon as you get home from the hospital.  Directions are included in the box, and you may call our office or the company technical support line if you need assistance connecting your transmitter.  Please send a transmission the day after you go home  Automated transmissions will be sent  every 3 months or sooner if device issues are detected.  You may manually send in transmissions if you are having arrhythmia symptoms or think there may be a problem with your device.  Please call our office at [phone] if you send in a transmission off-schedule         WHAT TO WATCH OUT FOR   Contact our office or seek emergency care for any of the following:  Drainage, bleeding or oozing from the site  Redness, increased swelling, or warmth around the device site  Pain not treated with prescribed pain medication  Temperature greater than 100.4F  Chest pain, shortness of breath, dizziness/fainting       OTHER INSTRUCTIONS   To reduce the risk of infection, avoid having any elective medical or dental procedures within 6 weeks of your device implant (this excludes routine dental cleaning).  If you are needing to have a procedure that is urgent or emergent within 6 weeks of device implant please contact the device clinic for further instructions.  Once your device has been in place for 6 weeks you do not need to be pretreated with antibiotics for any medical or dental procedures.           FOLLOW-UP   Our office will coordinate a follow-up visit visit in clinic for a device interrogation and an incision check 7-14 days after your procedure.   Please contact us at [phone] with any issues during your recovery.

## 2024-02-12 NOTE — Clinical Note
Tested the atrial lead. See vendor printout/MD dictation. [email or web address] 5076-45cm SN [id number] exp 2025-02-22

## 2024-02-12 NOTE — Clinical Note
Tested the left ventricular lead. See vendor printout/MD dictation. Attain performa MRI SureScan 4598-78cm SN [id number] exp 2025-07-05

## 2024-02-13 ENCOUNTER — TELEPHONE (OUTPATIENT)
Dept: CARDIOLOGY | Facility: CLINIC | Age: 54
End: 2024-02-13

## 2024-02-13 NOTE — PROGRESS NOTES
Patient discharged home with her son  in stable condition. Patient and son verbalize understanding of discharge instructions and follow up appointment. No questions or concerns at time of discharge.

## 2024-02-13 NOTE — TELEPHONE ENCOUNTER
Call placed to patient to review questions/concerns. States she is worried about riding the bus to work because it is uncomfortable at times with sudden start/stops and is afraid of having to brace herself quickly with Left arm. Worries she will risk dislodging lead. States she would like to be able to get [facility] paperwork filled so she can work remotely while recovering from ICD implant.    Discussed that there is no real contraindication to riding bus transportation with newly implanted ICD, review will need to use caution if/when has to brace herself on bus, trying not to use arm. Also discussed that maybe she could carpool with coworkers or friends to work for a little while. Patient agrees and will look into this option.     She also states she was confused on when she can return to work. States she was told by one person she should be off a week, and by another that she can return at anytime and then another nurse told her should not go right back to work. Would like clarification on when it is ok to go back.     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 Discussed that there should not be any work restriction in this line of work with an ICD. She understands and agrees.     Patient would like to make sure Dr. Elias is OK with her returning to work tomorrow. States she also needs a work excuse note written for yesterday/today as she did not go into work because of mixed messages on when she can return and while recovering yesterday.     We also discussed remote alta on phone. States it was downloaded but not sure if it is transmitting/set up. Does not appear we received the initial transmission so I gave her the Get Connected number for [employer] to call and make sure everything is registered/set up accordingly.     Will forward to Dr. Elias /EP team for work release note and  return to work clarification.     Chelle Shannon RN

## 2024-02-13 NOTE — TELEPHONE ENCOUNTER
[facility] Call Center    Phone Message    May a detailed message be left on voicemail: yes     Reason for Call: Other: Eva needs to have [facility] paperwork filled out with something that indicates that if there is an option for her to work from home, then that reasonable accomodation should be made.  Eva is on the bus for quite a bit going to and from work and she does not want to risk a sudden jolt from the bus causing her any pain or injury post-defibrillator implantation.  Eva will be faxing the [facility] paperwork to the clinic.  She also wants confirmation on the date she is able to return to work as she was given several conflicting time frames.  Eva would also like to have the details of how her defibrillator works gone over with her once again.  She was told a transmitter would be sent to her and she hasn't received it yet.  Please call Eva back to further discuss.     Action Taken: Other: Cardiology    Travel Screening: Not Applicable    Thank you!  [facility]

## 2024-02-14 NOTE — TELEPHONE ENCOUNTER
Chelle Shannon, RN  Tierra Elias MD; [facility] Ep Support Pool - Lhe15 hours ago (5:04 PM)     MG  Sounds good! Thank you Dr. Elias      RNs: could you follow up with patient on this tomorrow? (I am off until next Wednesday. Thank you! )      Tierra Elias MD  [facility] Device Pool - Lhe15 hours ago (4:49 PM)     AW  Thanks Chelle,    Given body habitus, chances of lead dislodgement, and discomfort I would recommend avoiding work or working from home for 1 week - would be okay supporting with work letter/FMLA paperwork to this effect.      Thank you,  Teresa

## 2024-02-16 NOTE — TELEPHONE ENCOUNTER
2nd Attempt to contact pt, voicemail message was left with contact information and instructing pt to call back.  2/16/2024 8:34 AM  Ann Tadeo RN

## 2024-02-19 ENCOUNTER — APPOINTMENT (OUTPATIENT)
Dept: RADIOLOGY | Facility: HOSPITAL | Age: 54
End: 2024-02-19
Attending: STUDENT IN AN ORGANIZED HEALTH CARE EDUCATION/TRAINING PROGRAM
Payer: COMMERCIAL

## 2024-02-19 ENCOUNTER — APPOINTMENT (OUTPATIENT)
Dept: CT IMAGING | Facility: HOSPITAL | Age: 54
End: 2024-02-19
Attending: EMERGENCY MEDICINE
Payer: COMMERCIAL

## 2024-02-19 ENCOUNTER — HOSPITAL ENCOUNTER (EMERGENCY)
Facility: HOSPITAL | Age: 54
Discharge: HOME | End: 2024-02-19
Attending: EMERGENCY MEDICINE | Admitting: EMERGENCY MEDICINE
Payer: COMMERCIAL

## 2024-02-19 VITALS
DIASTOLIC BLOOD PRESSURE: 73 MMHG | OXYGEN SATURATION: 94 % | HEART RATE: 98 BPM | TEMPERATURE: 97.8 F | WEIGHT: 219.4 LBS | BODY MASS INDEX: 37.46 KG/M2 | SYSTOLIC BLOOD PRESSURE: 119 MMHG | RESPIRATION RATE: 22 BRPM | HEIGHT: 64 IN

## 2024-02-19 DIAGNOSIS — R04.2 HEMOPTYSIS: ICD-10-CM

## 2024-02-19 DIAGNOSIS — J40 BRONCHITIS: ICD-10-CM

## 2024-02-19 LAB
ANION GAP SERPL CALCULATED.3IONS-SCNC: 8 MMOL/L (ref 7–15)
BUN SERPL-MCNC: 13.7 MG/DL (ref 6–20)
CALCIUM SERPL-MCNC: 9.1 MG/DL (ref 8.6–10)
CHLORIDE SERPL-SCNC: 106 MMOL/L (ref 98–107)
CREAT SERPL-MCNC: 0.96 MG/DL (ref 0.51–0.95)
DEPRECATED HCO3 PLAS-SCNC: 27 MMOL/L (ref 22–29)
EGFRCR SERPLBLD CKD-EPI 2021: 70 ML/MIN/1.73M2
ERYTHROCYTE [DISTWIDTH] IN BLOOD BY AUTOMATED COUNT: 12.7 % (ref 10–15)
GLUCOSE SERPL-MCNC: 139 MG/DL (ref 70–99)
HCT VFR BLD AUTO: 46.2 % (ref 35–47)
HGB BLD-MCNC: 14.9 G/DL (ref 11.7–15.7)
MCH RBC QN AUTO: 29 PG (ref 26.5–33)
MCHC RBC AUTO-ENTMCNC: 32.3 G/DL (ref 31.5–36.5)
MCV RBC AUTO: 90 FL (ref 78–100)
PLATELET # BLD AUTO: 142 10E3/UL (ref 150–450)
POTASSIUM SERPL-SCNC: 4.3 MMOL/L (ref 3.4–5.3)
RBC # BLD AUTO: 5.13 10E6/UL (ref 3.8–5.2)
SODIUM SERPL-SCNC: 141 MMOL/L (ref 135–145)
WBC # BLD AUTO: 7.5 10E3/UL (ref 4–11)

## 2024-02-19 PROCEDURE — 250N000011 HC RX IP 250 OP 636: Performed by: EMERGENCY MEDICINE

## 2024-02-19 PROCEDURE — 36415 COLL VENOUS BLD VENIPUNCTURE: CPT | Performed by: STUDENT IN AN ORGANIZED HEALTH CARE EDUCATION/TRAINING PROGRAM

## 2024-02-19 PROCEDURE — 85027 COMPLETE CBC AUTOMATED: CPT | Performed by: EMERGENCY MEDICINE

## 2024-02-19 PROCEDURE — 80048 BASIC METABOLIC PNL TOTAL CA: CPT | Performed by: STUDENT IN AN ORGANIZED HEALTH CARE EDUCATION/TRAINING PROGRAM

## 2024-02-19 PROCEDURE — 71275 CT ANGIOGRAPHY CHEST: CPT

## 2024-02-19 PROCEDURE — 80048 BASIC METABOLIC PNL TOTAL CA: CPT | Performed by: EMERGENCY MEDICINE

## 2024-02-19 PROCEDURE — 99285 EMERGENCY DEPT VISIT HI MDM: CPT | Mod: 25

## 2024-02-19 PROCEDURE — 71046 X-RAY EXAM CHEST 2 VIEWS: CPT

## 2024-02-19 PROCEDURE — 85027 COMPLETE CBC AUTOMATED: CPT | Performed by: STUDENT IN AN ORGANIZED HEALTH CARE EDUCATION/TRAINING PROGRAM

## 2024-02-19 RX ORDER — IOPAMIDOL 755 MG/ML
90 INJECTION, SOLUTION INTRAVASCULAR ONCE
Status: COMPLETED | OUTPATIENT
Start: 2024-02-19 | End: 2024-02-19

## 2024-02-19 RX ORDER — DOXYCYCLINE 100 MG/1
100 CAPSULE ORAL 2 TIMES DAILY
Qty: 14 CAPSULE | Refills: 0 | Status: SHIPPED | OUTPATIENT
Start: 2024-02-19 | End: 2024-02-26

## 2024-02-19 RX ADMIN — IOPAMIDOL 90 ML: 755 INJECTION, SOLUTION INTRAVENOUS at 22:50

## 2024-02-20 NOTE — ED PROVIDER NOTES
EMERGENCY DEPARTMENT ENCOUNTER      NAME: Eva Parikh  AGE: 53 year old female  YOB: 1970  MRN: [id number]  EVALUATION DATE & TIME: No admission date for patient encounter.    PCP: Reymundo Avila    ED PROVIDER: Ariel Dolan M.D.      Chief Complaint   Patient presents with    Hemoptysis         FINAL IMPRESSION:  1. Bronchitis    2. Hemoptysis          ED COURSE & MEDICAL DECISION MAKIN year old female presents to the Emergency Department for evaluation of cough and small-volume hemoptysis.  Patient with a history of emphysema, relatively recent diagnosis of nonischemic cardiomyopathy, status post ICD placement about a week ago in the hospital here.  Patient arrives to the emergency department vitally stable.  Cardiopulmonary exam is stable.  She has no significant extremity edema.  Chest wall surgical incision is well-healing.  X-ray shows pacemaker and leads in good position.  Chest x-ray and ultimately chest CT negative for any acute thoracic process like pulmonary embolism, severe pulmonary edema, pneumonia, etc.  I think the patient's symptoms are likely related to a bronchitis, the amount of hemoptysis she describes sounds very small.  She did not have a bloody sputum throughout a few hours of monitoring here in the emergency department while her workup was ongoing.  Discussed that given her underlying emphysema, would be reasonable to start her on a targeted antibiotic for bronchitis.  She reassuringly has upcoming follow-up with cardiology in the next few days.  Patient was reassured by her workup and was comfortable with plan for discharge.  Return precautions reviewed and patient discharged stable condition.    At the conclusion of the encounter I discussed the results of all of the tests and the disposition. The questions were answered. The patient or family acknowledged understanding and was agreeable with the care plan.       Medical Decision Making  Obtained  supplemental history:Supplemental history obtained?: Family Member/Significant Other  Reviewed external records: External records reviewed?: Documented in chart  Care impacted by chronic illness:Chronic Lung Disease and Heart Disease  Care significantly affected by social determinants of health:N/A  Did you consider but not order tests?: Work up considered but not performed and documented in chart, if applicable  Did you interpret images independently?: Independent interpretation of ECG and images noted in documentation, when applicable.  Consultation discussion with other provider:Did you involve another provider (consultant, , pharmacy, etc.)?: No  Discharge. No recommendations on prescription strength medication(s). See documentation for any additional details.        MEDICATIONS GIVEN IN THE EMERGENCY:  Medications   iopamidol (ISOVUE-370) solution 90 mL (90 mLs Intravenous $Given 2/19/24 2559)       NEW PRESCRIPTIONS STARTED AT TODAY'S ER VISIT  Discharge Medication List as of 2/19/2024 11:53 PM        START taking these medications    Details   doxycycline hyclate (VIBRAMYCIN) 100 MG capsule Take 1 capsule (100 mg) by mouth 2 times daily for 7 days, Disp-14 capsule, R-0, Local Print                =================================================================    HPI    Patient information was obtained from: The patient and her son    Use of : N/A         Eva Parikh is a 53 year old female with a pertinent history of COPD, heart failure, and GERD who presents to this ED by private vehicle for evaluation of hemoptysis.     Per Chart Review: The patient had an ICD placement at [facility] on 2/12/24.     The patient had her ICD procedure one week ago and then began coughing up blood 5 days ago. She reports that her sputum is tinged with blood and is a pink or brownish color. She is coughing up blood about 4 times a day. She is not coughing up dark red or bright red  blood. She denies any chest pain or fever. She endorses some leg swelling that is not new.     REVIEW OF SYSTEMS   All systems reviewed and negative except as noted in HPI.    PAST MEDICAL HISTORY:  Past Medical History:   Diagnosis Date    Acute hypoxemic respiratory failure (H) 08/26/2023    Anxiety     Arthritis     Asthma     COPD (chronic obstructive pulmonary disease) (H)     Depression     Heart failure with reduced ejection fraction (H)     Hepatic cyst 10/30/2017    New onset of congestive heart failure (H) 08/26/2023    Non-ischemic cardiomyopathy (H)     Obese     SAM (obstructive sleep apnea)     Sleep apnea        PAST SURGICAL HISTORY:  Past Surgical History:   Procedure Laterality Date    ADENOIDECTOMY      EP ICD INSERT BIVENT N/A 2/12/2024    Procedure: Implantable Cardioverter Defibrillator Device & Lead Implant Biventricular;  Surgeon: Tierra Elias MD;  Location: [facility] CV    TONSILLECTOMY             CURRENT MEDICATIONS:    No current facility-administered medications for this encounter.     Current Outpatient Medications   Medication    doxycycline hyclate (VIBRAMYCIN) 100 MG capsule    albuterol (PROAIR HFA/PROVENTIL HFA/VENTOLIN HFA) 108 (90 Base) MCG/ACT inhaler    albuterol (PROVENTIL) (2.5 MG/3ML) 0.083% neb solution    benztropine (COGENTIN) 0.5 MG tablet    bumetanide (BUMEX) 0.5 MG tablet    cholecalciferol (VITAMIN D3) 125 mcg (5000 units) capsule    DULoxetine (CYMBALTA) 60 MG capsule    empagliflozin (JARDIANCE) 10 MG TABS tablet    fluticasone-vilanterol (BREO ELLIPTA) 200-25 MCG/ACT inhaler    gabapentin (NEURONTIN) 100 MG capsule    gabapentin (NEURONTIN) 400 MG capsule    methylcellulose (CITRUCEL) 500 MG TABS tablet    metoprolol succinate ER (TOPROL XL) 25 MG 24 hr tablet    nicotine (NICORETTE) 4 MG lozenge    QUEtiapine (SEROQUEL) 100 MG tablet    sacubitril-valsartan (ENTRESTO) 24-26 MG per tablet    umeclidinium (INCRUSE ELLIPTA) 62.5 MCG/ACT inhaler  "        ALLERGIES:  Allergies   Allergen Reactions    Amoxicillin Unknown     As kid had mold test and told allergic     Mold [Molds & Smuts] Unknown    Mold/Mildew [Molds & Smuts] Unknown    Other Environmental Allergy Unknown     DUST    Penicillins Unknown     As kid had mold test and told allergic     Pollen [Pollen Extract] Unknown       FAMILY HISTORY:  Family History   Problem Relation Age of Onset    Diabetes Mother     Thyroid Disease Mother     Heart Disease Mother     Mental Illness Mother     Diabetes Maternal Grandmother     Heart Disease Maternal Grandmother     Bipolar Disorder Sister        SOCIAL HISTORY:   Social History     Socioeconomic History    Marital status: Single    Number of children: 1    Years of education: HS   Tobacco Use    Smoking status: Former     Packs/day: 1     Types: Cigarettes     Quit date: 2020     Years since quittin.1    Smokeless tobacco: Never    Tobacco comments:     Decreased from 2 ppd to 1 ppd since May 2014   Substance and Sexual Activity    Alcohol use: No    Drug use: No    Sexual activity: Not Currently   Social History Narrative    Lives on her own.       VITALS:  /73   Pulse 98   Temp 97.8  F (36.6  C) (Temporal)   Resp 22   Ht 1.626 m (5' 4\")   Wt 99.5 kg (219 lb 6.4 oz)   SpO2 94%   BMI 37.66 kg/m      PHYSICAL EXAM    Constitutional: Well developed, Well nourished, NAD.  HENT: Normocephalic, Atraumatic. Neck Supple.  Eyes: EOMI, Conjunctiva normal.  Respiratory: Breathing comfortably on room air. Speaks full sentences easily. Lungs clear to ascultation.  Cardiovascular: Normal heart rate, Regular rhythm. No peripheral edema.  Abdomen: Soft, nontender  Musculoskeletal: Good range of motion in all major joints. No major deformities noted.  Integument: Warm, Dry.  Neurologic: Alert & awake, Normal motor function, Normal sensory function, No focal deficits noted.   Psychiatric: Cooperative. Affect appropriate.     LAB:  All pertinent labs " reviewed and interpreted.  Labs Ordered and Resulted from Time of ED Arrival to Time of ED Departure   CBC WITH PLATELETS - Abnormal       Result Value    WBC Count 7.5      RBC Count 5.13      Hemoglobin 14.9      Hematocrit 46.2      MCV 90      MCH 29.0      MCHC 32.3      RDW 12.7      Platelet Count 142 (*)    BASIC METABOLIC PANEL - Abnormal    Sodium 141      Potassium 4.3      Chloride 106      Carbon Dioxide (CO2) 27      Anion Gap 8      Urea Nitrogen 13.7      Creatinine 0.96 (*)     GFR Estimate 70      Calcium 9.1      Glucose 139 (*)        RADIOLOGY:  Reviewed all pertinent imaging. Please see official radiology report.  CT Chest Pulmonary Embolism w Contrast   Final Result   IMPRESSION:   1.  No pulmonary embolus or other acute process within the chest.      XR Chest 2 Views   Final Result   IMPRESSION: Mild bibasilar atelectasis. No pleural effusion or pneumothorax.      Mild cardiomegaly.      Left chest wall cardiac implantable electronic device.                I, Ronit Medrano, am serving as a scribe to document services personally performed by Dr. Ariel Dolan, based on my observation and the provider's statements to me. I, Ariel Dolan MD attest that Ronit Medrano is acting in a scribe capacity, has observed my performance of the services and has documented them in accordance with my direction.    Ariel Dolan M.D.  Emergency Medicine  [facility] EMERGENCY DEPARTMENT  [address] [place] [id number]  [phone]  Dept: [phone]       Ariel Dolan MD  02/20/24 0003

## 2024-02-20 NOTE — DISCHARGE INSTRUCTIONS
You are seen in the emergency department for cough including some bloody phlegm.  Your evaluation included labs and CT imaging.  This was negative for anything serious like pneumonia, blood clots, etc.  We think that your symptoms are related to a bronchitis which is inflammation in the upper airways causing coughing and a small amount of bleeding.  Given your underlying emphysema we are suggesting an antibiotic.  Please make sure you are still drinking plenty of liquids, you can use Tylenol intermittently as needed for pain.  Continue your planned follow-up with your cardiologist and primary after this ED visit.  If you have any other immediate concerns like severe increase in volume of bloody phlegm, breathing difficulties at rest, severe chest pain, etc. we can reevaluate as needed in the emergency department.

## 2024-02-20 NOTE — ED TRIAGE NOTES
"Pt arrives via private car with son.     Pt had surgery last week for AICD. Pt began coughing up blood on Wednesday. \"Sometimes it's a little in the phlegm. Sometimes more. About 3 times per day.\"     Denies hx of this. Is not on blood thinners. Denies etoh or drug use.     Only pain is at incision site. Took tylenol at 6pm. 3-4/10.     "

## 2024-02-21 ENCOUNTER — ANCILLARY PROCEDURE (OUTPATIENT)
Dept: CARDIOLOGY | Facility: CLINIC | Age: 54
End: 2024-02-21
Attending: INTERNAL MEDICINE
Payer: COMMERCIAL

## 2024-02-21 DIAGNOSIS — Z95.810 ICD (IMPLANTABLE CARDIOVERTER-DEFIBRILLATOR) IN PLACE: ICD-10-CM

## 2024-02-21 LAB
MDC_IDC_LEAD_CONNECTION_STATUS: NORMAL
MDC_IDC_LEAD_IMPLANT_DT: NORMAL
MDC_IDC_LEAD_LOCATION: NORMAL
MDC_IDC_LEAD_LOCATION_DETAIL_1: NORMAL
MDC_IDC_LEAD_MFG: NORMAL
MDC_IDC_LEAD_MODEL: NORMAL
MDC_IDC_LEAD_POLARITY_TYPE: NORMAL
MDC_IDC_LEAD_SERIAL: NORMAL
MDC_IDC_LEAD_SPECIAL_FUNCTION: NORMAL
MDC_IDC_MSMT_BATTERY_DTM: NORMAL
MDC_IDC_MSMT_BATTERY_REMAINING_LONGEVITY: 52 MO
MDC_IDC_MSMT_BATTERY_RRT_TRIGGER: NORMAL
MDC_IDC_MSMT_BATTERY_VOLTAGE: 3.09 V
MDC_IDC_MSMT_CAP_CHARGE_ENERGY: 40 J
MDC_IDC_MSMT_CAP_CHARGE_TIME: 0 S
MDC_IDC_MSMT_CAP_CHARGE_TYPE: NORMAL
MDC_IDC_MSMT_LEADCHNL_LV_IMPEDANCE_VALUE: 266 OHM
MDC_IDC_MSMT_LEADCHNL_LV_IMPEDANCE_VALUE: 285 OHM
MDC_IDC_MSMT_LEADCHNL_LV_IMPEDANCE_VALUE: 342 OHM
MDC_IDC_MSMT_LEADCHNL_LV_IMPEDANCE_VALUE: 361 OHM
MDC_IDC_MSMT_LEADCHNL_LV_IMPEDANCE_VALUE: 437 OHM
MDC_IDC_MSMT_LEADCHNL_LV_IMPEDANCE_VALUE: 494 OHM
MDC_IDC_MSMT_LEADCHNL_LV_IMPEDANCE_VALUE: 551 OHM
MDC_IDC_MSMT_LEADCHNL_LV_IMPEDANCE_VALUE: 570 OHM
MDC_IDC_MSMT_LEADCHNL_LV_PACING_THRESHOLD_AMPLITUDE: 2 V
MDC_IDC_MSMT_LEADCHNL_LV_PACING_THRESHOLD_AMPLITUDE: 2.75 V
MDC_IDC_MSMT_LEADCHNL_LV_PACING_THRESHOLD_AMPLITUDE: 3 V
MDC_IDC_MSMT_LEADCHNL_LV_PACING_THRESHOLD_AMPLITUDE: 3.25 V
MDC_IDC_MSMT_LEADCHNL_LV_PACING_THRESHOLD_AMPLITUDE: 3.5 V
MDC_IDC_MSMT_LEADCHNL_LV_PACING_THRESHOLD_AMPLITUDE: 4 V
MDC_IDC_MSMT_LEADCHNL_LV_PACING_THRESHOLD_PULSEWIDTH: 0.4 MS
MDC_IDC_MSMT_LEADCHNL_LV_PACING_THRESHOLD_PULSEWIDTH: 0.8 MS
MDC_IDC_MSMT_LEADCHNL_LV_PACING_THRESHOLD_PULSEWIDTH: 1 MS
MDC_IDC_MSMT_LEADCHNL_RA_IMPEDANCE_VALUE: 323 OHM
MDC_IDC_MSMT_LEADCHNL_RA_PACING_THRESHOLD_AMPLITUDE: 0.5 V
MDC_IDC_MSMT_LEADCHNL_RA_PACING_THRESHOLD_AMPLITUDE: 0.5 V
MDC_IDC_MSMT_LEADCHNL_RA_PACING_THRESHOLD_PULSEWIDTH: 0.4 MS
MDC_IDC_MSMT_LEADCHNL_RA_PACING_THRESHOLD_PULSEWIDTH: 0.4 MS
MDC_IDC_MSMT_LEADCHNL_RA_SENSING_INTR_AMPL: 4.6 MV
MDC_IDC_MSMT_LEADCHNL_RV_IMPEDANCE_VALUE: 323 OHM
MDC_IDC_MSMT_LEADCHNL_RV_IMPEDANCE_VALUE: 399 OHM
MDC_IDC_MSMT_LEADCHNL_RV_PACING_THRESHOLD_AMPLITUDE: 0.88 V
MDC_IDC_MSMT_LEADCHNL_RV_PACING_THRESHOLD_AMPLITUDE: 1 V
MDC_IDC_MSMT_LEADCHNL_RV_PACING_THRESHOLD_PULSEWIDTH: 0.4 MS
MDC_IDC_MSMT_LEADCHNL_RV_PACING_THRESHOLD_PULSEWIDTH: 0.4 MS
MDC_IDC_MSMT_LEADCHNL_RV_SENSING_INTR_AMPL: 10.1 MV
MDC_IDC_PG_IMPLANT_DTM: NORMAL
MDC_IDC_PG_MFG: NORMAL
MDC_IDC_PG_MODEL: NORMAL
MDC_IDC_PG_SERIAL: NORMAL
MDC_IDC_PG_TYPE: NORMAL
MDC_IDC_SESS_CLINIC_NAME: NORMAL
MDC_IDC_SESS_DTM: NORMAL
MDC_IDC_SESS_TYPE: NORMAL
MDC_IDC_SET_BRADY_AT_MODE_SWITCH_RATE: 171 {BEATS}/MIN
MDC_IDC_SET_BRADY_LOWRATE: 50 {BEATS}/MIN
MDC_IDC_SET_BRADY_MAX_SENSOR_RATE: 130 {BEATS}/MIN
MDC_IDC_SET_BRADY_MAX_TRACKING_RATE: 130 {BEATS}/MIN
MDC_IDC_SET_BRADY_MODE: NORMAL
MDC_IDC_SET_BRADY_PAV_DELAY_LOW: 130 MS
MDC_IDC_SET_BRADY_SAV_DELAY_LOW: 110 MS
MDC_IDC_SET_CRT_LVRV_DELAY: 0 MS
MDC_IDC_SET_CRT_PACED_CHAMBERS: NORMAL
MDC_IDC_SET_LEADCHNL_LV_PACING_AMPLITUDE: 3.5 V
MDC_IDC_SET_LEADCHNL_LV_PACING_ANODE_ELECTRODE_1: NORMAL
MDC_IDC_SET_LEADCHNL_LV_PACING_ANODE_LOCATION_1: NORMAL
MDC_IDC_SET_LEADCHNL_LV_PACING_CAPTURE_MODE: NORMAL
MDC_IDC_SET_LEADCHNL_LV_PACING_CATHODE_ELECTRODE_1: NORMAL
MDC_IDC_SET_LEADCHNL_LV_PACING_CATHODE_LOCATION_1: NORMAL
MDC_IDC_SET_LEADCHNL_LV_PACING_POLARITY: NORMAL
MDC_IDC_SET_LEADCHNL_LV_PACING_PULSEWIDTH: 1 MS
MDC_IDC_SET_LEADCHNL_RA_PACING_AMPLITUDE: 1.75 V
MDC_IDC_SET_LEADCHNL_RA_PACING_ANODE_ELECTRODE_1: NORMAL
MDC_IDC_SET_LEADCHNL_RA_PACING_ANODE_LOCATION_1: NORMAL
MDC_IDC_SET_LEADCHNL_RA_PACING_CAPTURE_MODE: NORMAL
MDC_IDC_SET_LEADCHNL_RA_PACING_CATHODE_ELECTRODE_1: NORMAL
MDC_IDC_SET_LEADCHNL_RA_PACING_CATHODE_LOCATION_1: NORMAL
MDC_IDC_SET_LEADCHNL_RA_PACING_POLARITY: NORMAL
MDC_IDC_SET_LEADCHNL_RA_PACING_PULSEWIDTH: 0.4 MS
MDC_IDC_SET_LEADCHNL_RA_SENSING_ANODE_ELECTRODE_1: NORMAL
MDC_IDC_SET_LEADCHNL_RA_SENSING_ANODE_LOCATION_1: NORMAL
MDC_IDC_SET_LEADCHNL_RA_SENSING_CATHODE_ELECTRODE_1: NORMAL
MDC_IDC_SET_LEADCHNL_RA_SENSING_CATHODE_LOCATION_1: NORMAL
MDC_IDC_SET_LEADCHNL_RA_SENSING_POLARITY: NORMAL
MDC_IDC_SET_LEADCHNL_RA_SENSING_SENSITIVITY: 0.3 MV
MDC_IDC_SET_LEADCHNL_RV_PACING_AMPLITUDE: 2 V
MDC_IDC_SET_LEADCHNL_RV_PACING_ANODE_ELECTRODE_1: NORMAL
MDC_IDC_SET_LEADCHNL_RV_PACING_ANODE_LOCATION_1: NORMAL
MDC_IDC_SET_LEADCHNL_RV_PACING_CAPTURE_MODE: NORMAL
MDC_IDC_SET_LEADCHNL_RV_PACING_CATHODE_ELECTRODE_1: NORMAL
MDC_IDC_SET_LEADCHNL_RV_PACING_CATHODE_LOCATION_1: NORMAL
MDC_IDC_SET_LEADCHNL_RV_PACING_POLARITY: NORMAL
MDC_IDC_SET_LEADCHNL_RV_PACING_PULSEWIDTH: 0.4 MS
MDC_IDC_SET_LEADCHNL_RV_SENSING_ANODE_ELECTRODE_1: NORMAL
MDC_IDC_SET_LEADCHNL_RV_SENSING_ANODE_LOCATION_1: NORMAL
MDC_IDC_SET_LEADCHNL_RV_SENSING_CATHODE_ELECTRODE_1: NORMAL
MDC_IDC_SET_LEADCHNL_RV_SENSING_CATHODE_LOCATION_1: NORMAL
MDC_IDC_SET_LEADCHNL_RV_SENSING_POLARITY: NORMAL
MDC_IDC_SET_LEADCHNL_RV_SENSING_SENSITIVITY: 0.3 MV
MDC_IDC_SET_ZONE_DETECTION_BEATS_DENOMINATOR: 16 {BEATS}
MDC_IDC_SET_ZONE_DETECTION_BEATS_DENOMINATOR: 32 {BEATS}
MDC_IDC_SET_ZONE_DETECTION_BEATS_DENOMINATOR: 40 {BEATS}
MDC_IDC_SET_ZONE_DETECTION_BEATS_NUMERATOR: 16 {BEATS}
MDC_IDC_SET_ZONE_DETECTION_BEATS_NUMERATOR: 30 {BEATS}
MDC_IDC_SET_ZONE_DETECTION_BEATS_NUMERATOR: 32 {BEATS}
MDC_IDC_SET_ZONE_DETECTION_INTERVAL: 270 MS
MDC_IDC_SET_ZONE_DETECTION_INTERVAL: 320 MS
MDC_IDC_SET_ZONE_DETECTION_INTERVAL: 350 MS
MDC_IDC_SET_ZONE_DETECTION_INTERVAL: 360 MS
MDC_IDC_SET_ZONE_DETECTION_INTERVAL: 400 MS
MDC_IDC_SET_ZONE_STATUS: NORMAL
MDC_IDC_SET_ZONE_TYPE: NORMAL
MDC_IDC_SET_ZONE_VENDOR_TYPE: NORMAL
MDC_IDC_STAT_AT_BURDEN_PERCENT: 0 %
MDC_IDC_STAT_AT_DTM_END: NORMAL
MDC_IDC_STAT_AT_DTM_START: NORMAL
MDC_IDC_STAT_BRADY_AP_VP_PERCENT: 0.02 %
MDC_IDC_STAT_BRADY_AP_VS_PERCENT: 0.02 %
MDC_IDC_STAT_BRADY_AS_VP_PERCENT: 97.95 %
MDC_IDC_STAT_BRADY_AS_VS_PERCENT: 2.01 %
MDC_IDC_STAT_BRADY_DTM_END: NORMAL
MDC_IDC_STAT_BRADY_DTM_START: NORMAL
MDC_IDC_STAT_BRADY_RA_PERCENT_PACED: 0.04 %
MDC_IDC_STAT_BRADY_RV_PERCENT_PACED: 32.73 %
MDC_IDC_STAT_CRT_DTM_END: NORMAL
MDC_IDC_STAT_CRT_DTM_START: NORMAL
MDC_IDC_STAT_CRT_LV_PERCENT_PACED: 97.95 %
MDC_IDC_STAT_CRT_PERCENT_PACED: 32.71 %
MDC_IDC_STAT_EPISODE_RECENT_COUNT: 0
MDC_IDC_STAT_EPISODE_RECENT_COUNT_DTM_END: NORMAL
MDC_IDC_STAT_EPISODE_RECENT_COUNT_DTM_START: NORMAL
MDC_IDC_STAT_EPISODE_TOTAL_COUNT: 0
MDC_IDC_STAT_EPISODE_TOTAL_COUNT_DTM_END: NORMAL
MDC_IDC_STAT_EPISODE_TOTAL_COUNT_DTM_START: NORMAL
MDC_IDC_STAT_EPISODE_TYPE: NORMAL
MDC_IDC_STAT_TACHYTHERAPY_ATP_DELIVERED_RECENT: 0
MDC_IDC_STAT_TACHYTHERAPY_ATP_DELIVERED_TOTAL: 0
MDC_IDC_STAT_TACHYTHERAPY_RECENT_DTM_END: NORMAL
MDC_IDC_STAT_TACHYTHERAPY_RECENT_DTM_START: NORMAL
MDC_IDC_STAT_TACHYTHERAPY_SHOCKS_ABORTED_RECENT: 0
MDC_IDC_STAT_TACHYTHERAPY_SHOCKS_ABORTED_TOTAL: 0
MDC_IDC_STAT_TACHYTHERAPY_SHOCKS_DELIVERED_RECENT: 0
MDC_IDC_STAT_TACHYTHERAPY_SHOCKS_DELIVERED_TOTAL: 0
MDC_IDC_STAT_TACHYTHERAPY_TOTAL_DTM_END: NORMAL
MDC_IDC_STAT_TACHYTHERAPY_TOTAL_DTM_START: NORMAL

## 2024-02-21 PROCEDURE — 93284 PRGRMG EVAL IMPLANTABLE DFB: CPT | Performed by: INTERNAL MEDICINE

## 2024-02-23 NOTE — TELEPHONE ENCOUNTER
3rd Attempt to contact pt.     Discussed paperwork request and asked what pt would like to have filled out/completed. Let pt know Dr. Elias approved 1 week off or remote. She reports she only worked a few hours for a couple days that week. She would like a letter requesting that week off. Will complete and mail to pt.     Radha Berger RN

## 2024-03-01 ENCOUNTER — OFFICE VISIT (OUTPATIENT)
Dept: CARDIOLOGY | Facility: CLINIC | Age: 54
End: 2024-03-01
Attending: NURSE PRACTITIONER
Payer: COMMERCIAL

## 2024-03-01 VITALS
HEIGHT: 64 IN | BODY MASS INDEX: 37.22 KG/M2 | RESPIRATION RATE: 16 BRPM | DIASTOLIC BLOOD PRESSURE: 70 MMHG | HEART RATE: 89 BPM | WEIGHT: 218 LBS | SYSTOLIC BLOOD PRESSURE: 108 MMHG

## 2024-03-01 DIAGNOSIS — G47.33 OSA (OBSTRUCTIVE SLEEP APNEA): ICD-10-CM

## 2024-03-01 DIAGNOSIS — I42.8 NONCOMPACTION CARDIOMYOPATHY (H): ICD-10-CM

## 2024-03-01 DIAGNOSIS — E66.01 MORBID OBESITY (H): Chronic | ICD-10-CM

## 2024-03-01 DIAGNOSIS — I42.8 NON-ISCHEMIC CARDIOMYOPATHY (H): Chronic | ICD-10-CM

## 2024-03-01 DIAGNOSIS — I95.9 HYPOTENSION, UNSPECIFIED HYPOTENSION TYPE: ICD-10-CM

## 2024-03-01 DIAGNOSIS — I50.20 HEART FAILURE WITH REDUCED EJECTION FRACTION (H): Chronic | ICD-10-CM

## 2024-03-01 DIAGNOSIS — I50.20 HEART FAILURE WITH REDUCED EJECTION FRACTION (H): Primary | Chronic | ICD-10-CM

## 2024-03-01 PROCEDURE — 99214 OFFICE O/P EST MOD 30 MIN: CPT | Mod: 24 | Performed by: NURSE PRACTITIONER

## 2024-03-01 RX ORDER — BUMETANIDE 0.5 MG/1
0.5 TABLET ORAL DAILY
Qty: 90 TABLET | Refills: 1 | Status: SHIPPED | OUTPATIENT
Start: 2024-03-01

## 2024-03-01 RX ORDER — BUMETANIDE 0.5 MG/1
0.5 TABLET ORAL DAILY
Qty: 90 TABLET | Refills: 1 | Status: SHIPPED | OUTPATIENT
Start: 2024-03-01 | End: 2024-03-01

## 2024-03-01 NOTE — LETTER
March 1, 2024      Eva Parikh  [address] [place] [address]        To Whom It May Concern:    Eva Parikh will need to be excused from work for a half day on February 12th and full day on February 13th and February 15th due to surgery. Thank you        Sincerely,        JUAN Valladares CNP

## 2024-03-01 NOTE — PATIENT INSTRUCTIONS
Eva Parikh,    It was a pleasure to see you today at [facility].     My recommendations after this visit include:  - Please follow up with Dr Vázquez in July   - Please follow up with Katherine Pearce depending on echocardiogram results  - Schedule echocardiogram mid May  - Continue current medications    Katherine Pearce, CNP

## 2024-03-01 NOTE — PROGRESS NOTES
Assessment/Recommendations   Assessment:    1. Nonischemic cardiomyopathy, heart failure with reduced ejection fraction, ejection fraction 20-25%, NYHA class II: Compensated.  She has fatigue and mild dyspnea on exertion.  Her weight is increased slightly but due to increasing fluid intake.  Status post CRT-D implant February 12.  BiV pacing was 84.4% on her postop device check.  Next device check scheduled in clinic March 26.  Provided her with a letter to excuse her from work post device implant  2.  Hypotension: Blood pressure 108/70.  She states her lightheadedness has improved  3.  SAM: Not currently wearing her CPAP.  She states it was broken.  She still has not followed up with sleep medicine to obtain a new CPAP.  Encouraged her to follow-up with them  4.  Obesity: BMI 37.42.  Encouraged her to work on exercising    Plan:  1.  Continue current medications  2.  Schedule echocardiogram mid May to reassess post CRT-D implant  3.  Encourage exercise  4.  Continue monitoring daily weights and following a low-sodium diet  5.  Follow-up with sleep medicine to obtain new CPAP    Eva Parikh will follow up with Dr. Vázquez in July and in the heart failure clinic depending on echocardiogram results.     History of Present Illness/Subjective    Ms. Eva Parikh is a 53 year old female seen at [facility] heart failure clinic today for continued follow-up.  Her son Jakub accompanies her today.  She follows up for heart failure with reduced ejection fraction, nonischemic cardiomyopathy. She had an echocardiogram November 2023 which showed an ejection fraction of 20-25%.  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  She has a past medical history significant for SAM not on CPAP due to hers being broken, COPD, asthma, obesity, anxiety.       Today, she has fatigue and dyspnea on exertion.  She  "denies lightheadedness, shortness of breath, orthopnea, PND, palpitations, chest pain, abdominal fullness/bloating, and lower extremity edema.      She is monitoring home weights which are stable between 213-216 pounds.  She is following a low sodium diet.      Physical Examination Review of Systems   /70 (BP Location: Right arm, Patient Position: Sitting, Cuff Size: Adult Regular)   Pulse 89   Resp 16   Ht 1.626 m (5' 4\")   Wt 98.9 kg (218 lb)   BMI 37.42 kg/m    Body mass index is 37.42 kg/m .  Wt Readings from Last 3 Encounters:   03/01/24 98.9 kg (218 lb)   01/22/24 97.1 kg (214 lb)   01/10/24 96.6 kg (213 lb)       General Appearance:   no acute distress   ENT/Mouth: No abnormalities   EYES:  no scleral icterus, normal conjunctivae   Neck: no thyromegaly   Chest/Lungs:   lungs are clear to auscultation, no rales or wheezing, equal chest wall expansion    Cardiovascular:   Regular. Normal first and second heart sounds with no murmurs, rubs, or gallops, no edema bilaterally    Abdomen:  bowel sounds are present   Extremities: no cyanosis or clubbing   Skin: warm   Neurologic: no tremors     Psychiatric: alert and oriented x3    Enc Vitals  BP: 108/70  Pulse: 89  Resp: 16  Weight: 98.9 kg (218 lb)  Height: 162.6 cm (5' 4\")                                         Medical History  Surgical History Family History Social History   Past Medical History:   Diagnosis Date    Acute hypoxemic respiratory failure (H) 08/26/2023    Anxiety     Arthritis     Asthma     COPD (chronic obstructive pulmonary disease) (H)     Depression     Heart failure with reduced ejection fraction (H)     Hepatic cyst 10/30/2017    New onset of congestive heart failure (H) 08/26/2023    Non-ischemic cardiomyopathy (H)     Obese     SAM (obstructive sleep apnea)     Sleep apnea     Past Surgical History:   Procedure Laterality Date    ADENOIDECTOMY      EP ICD INSERT BIVENT N/A 02/12/2024    Procedure: Implantable Cardioverter " Defibrillator Device & Lead Implant Biventricular;  Surgeon: Tierra Elias MD;  Location: [facility] CV    IMPLANT AUTOMATIC IMPLANTABLE CARDIOVERTER DEFIBRILLATOR      TONSILLECTOMY      Family History   Problem Relation Age of Onset    Diabetes Mother     Thyroid Disease Mother     Heart Disease Mother     Mental Illness Mother     Diabetes Maternal Grandmother     Heart Disease Maternal Grandmother     Bipolar Disorder Sister     Social History     Socioeconomic History    Marital status: Single     Spouse name: Not on file    Number of children: 1    Years of education:     Highest education level: Not on file   Occupational History    Not on file   Tobacco Use    Smoking status: Former     Packs/day: 1     Types: Cigarettes     Quit date: 2020     Years since quittin.1    Smokeless tobacco: Never    Tobacco comments:     Decreased from 2 ppd to 1 ppd since May 2014   Substance and Sexual Activity    Alcohol use: No    Drug use: No    Sexual activity: Not Currently   Other Topics Concern    Not on file   Social History Narrative    Lives on her own.     Social Determinants of Health     Financial Resource Strain: Not on file   Food Insecurity: Not on file   Transportation Needs: Not on file   Physical Activity: Not on file   Stress: Not on file   Social Connections: Not on file   Interpersonal Safety: Not on file   Housing Stability: Not on file          Medications  Allergies   Current Outpatient Medications   Medication Sig Dispense Refill    albuterol (PROAIR HFA/PROVENTIL HFA/VENTOLIN HFA) 108 (90 Base) MCG/ACT inhaler Inhale 2 puffs into the lungs every 6 hours 18 g 11    albuterol (PROVENTIL) (2.5 MG/3ML) 0.083% neb solution Take 1 vial (2.5 mg) by nebulization every 6 hours as needed for shortness of breath or wheezing 90 mL 3    benztropine (COGENTIN) 0.5 MG tablet Take 0.5 mg by mouth At Bedtime      bumetanide (BUMEX) 0.5 MG tablet Take 1 tablet (0.5 mg) by mouth daily 90  tablet 1    cholecalciferol (VITAMIN D3) 125 mcg (5000 units) capsule Take 125 mcg by mouth daily      DULoxetine (CYMBALTA) 60 MG capsule Take 1 capsule by mouth at bedtime      empagliflozin (JARDIANCE) 10 MG TABS tablet Take 1 tablet (10 mg) by mouth daily 90 tablet 1    fluticasone-vilanterol (BREO ELLIPTA) 200-25 MCG/ACT inhaler Inhale 1 puff into the lungs daily 60 each 11    gabapentin (NEURONTIN) 100 MG capsule Take 100 mg by mouth 2 times daily      gabapentin (NEURONTIN) 400 MG capsule Take 400 mg by mouth At Bedtime      methylcellulose (CITRUCEL) 500 MG TABS tablet Take 500 mg by mouth as needed      metoprolol succinate ER (TOPROL XL) 25 MG 24 hr tablet Take 1 tablet (25 mg) by mouth daily 90 tablet 1    nicotine (NICORETTE) 4 MG lozenge Place 4 mg inside cheek as needed for smoking cessation      QUEtiapine (SEROQUEL) 100 MG tablet Take 1.5 tablets by mouth at bedtime      sacubitril-valsartan (ENTRESTO) 24-26 MG per tablet Take 1 tablet by mouth 2 times daily 180 tablet 1    umeclidinium (INCRUSE ELLIPTA) 62.5 MCG/ACT inhaler Inhale 1 puff into the lungs daily 30 each 11    Allergies   Allergen Reactions    Amoxicillin Unknown     As kid had mold test and told allergic     Mold [Molds & Smuts] Unknown    Mold/Mildew [Molds & Smuts] Unknown    Other Environmental Allergy Unknown     DUST    Penicillins Unknown     As kid had mold test and told allergic     Pollen [Pollen Extract] Unknown         Lab Results    Chemistry/lipid CBC Cardiac Enzymes/BNP/TSH/INR   Lab Results   Component Value Date    CHOL 154 09/08/2023    HDL 28 (L) 09/08/2023    TRIG 166 (H) 09/08/2023    BUN 13.7 02/19/2024     02/19/2024    CO2 27 02/19/2024    Lab Results   Component Value Date    WBC 7.5 02/19/2024    HGB 14.9 02/19/2024    HCT 46.2 02/19/2024    MCV 90 02/19/2024     (L) 02/19/2024    Lab Results   Component Value Date    TROPONINI 0.02 01/01/2020    BNP 16 01/01/2020    TSH 0.45 08/27/2023              This note has been dictated using voice recognition software. Any grammatical, typographical, or context distortions are unintentional and inherent to the software    30 minutes spent on the date of encounter doing chart review, review of outside records, review of test results, interpretation with above tests, patient visit, documentation, and discussion with family.

## 2024-03-01 NOTE — LETTER
3/1/2024    Reymundo Avila MD  [address]  [place] [address]    RE: Eva Parikh       Dear Colleague,     I had the pleasure of seeing Eva Parikh in the [facility].        Assessment/Recommendations   Assessment:    1. Nonischemic cardiomyopathy, heart failure with reduced ejection fraction, ejection fraction 20-25%, NYHA class II: Compensated.  She has fatigue and mild dyspnea on exertion.  Her weight is increased slightly but due to increasing fluid intake.  Status post CRT-D implant February 12.  BiV pacing was 84.4% on her postop device check.  Next device check scheduled in clinic March 26.  Provided her with a letter to excuse her from work post device implant  2.  Hypotension: Blood pressure 108/70.  She states her lightheadedness has improved  3.  SAM: Not currently wearing her CPAP.  She states it was broken.  She still has not followed up with sleep medicine to obtain a new CPAP.  Encouraged her to follow-up with them  4.  Obesity: BMI 37.42.  Encouraged her to work on exercising    Plan:  1.  Continue current medications  2.  Schedule echocardiogram mid May to reassess post CRT-D implant  3.  Encourage exercise  4.  Continue monitoring daily weights and following a low-sodium diet  5.  Follow-up with sleep medicine to obtain new CPAP    Eva Parikh will follow up with Dr. Vázquez in July and in the heart failure clinic depending on echocardiogram results.     History of Present Illness/Subjective    Ms. Eva Parikh is a 53 year old female seen at [facility] heart failure clinic today for continued follow-up.  Her son Jakub accompanies her today.  She follows up for heart failure with reduced ejection fraction, nonischemic cardiomyopathy. She had an echocardiogram November 2023 which showed an ejection fraction of 20-25%.  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  She has a past medical history significant for SAM not on CPAP due to hers being broken, COPD, asthma, obesity, anxiety.       Today, she has fatigue and dyspnea on exertion.  She denies lightheadedness, shortness of breath, orthopnea, PND, palpitations, chest pain, abdominal fullness/bloating, and lower extremity edema.      She is monitoring home weights which are stable between 213-216 pounds.  She is following a low sodium diet.      Physical Examination Review of Systems   /70 (BP Location: Right arm, Patient Position: Sitting, Cuff Size: Adult Regular)   Pulse 89   Resp 16   Ht 1.626 m (5' 4\")   Wt 98.9 kg (218 lb)   BMI 37.42 kg/m    Body mass index is 37.42 kg/m .  Wt Readings from Last 3 Encounters:   03/01/24 98.9 kg (218 lb)   01/22/24 97.1 kg (214 lb)   01/10/24 96.6 kg (213 lb)       General Appearance:   no acute distress   ENT/Mouth: No abnormalities   EYES:  no scleral icterus, normal conjunctivae   Neck: no thyromegaly   Chest/Lungs:   lungs are clear to auscultation, no rales or wheezing, equal chest wall expansion    Cardiovascular:   Regular. Normal first and second heart sounds with no murmurs, rubs, or gallops, no edema bilaterally    Abdomen:  bowel sounds are present   Extremities: no cyanosis or clubbing   Skin: warm   Neurologic: no tremors     Psychiatric: alert and oriented x3    Enc Vitals  BP: 108/70  Pulse: 89  Resp: 16  Weight: 98.9 kg (218 lb)  Height: 162.6 cm (5' 4\")                                         Medical History  Surgical History Family History Social History   Past Medical History:   Diagnosis Date    Acute hypoxemic respiratory failure (H) 08/26/2023    Anxiety     Arthritis     Asthma     COPD (chronic obstructive pulmonary disease) (H)     Depression     Heart failure with reduced ejection fraction (H)     Hepatic cyst 10/30/2017    New onset of congestive heart failure (H) 08/26/2023    Non-ischemic cardiomyopathy " (H)     Obese     SAM (obstructive sleep apnea)     Sleep apnea     Past Surgical History:   Procedure Laterality Date    ADENOIDECTOMY      EP ICD INSERT BIVENT N/A 2024    Procedure: Implantable Cardioverter Defibrillator Device & Lead Implant Biventricular;  Surgeon: Tierra Elias MD;  Location: [facility] LAB CV    IMPLANT AUTOMATIC IMPLANTABLE CARDIOVERTER DEFIBRILLATOR      TONSILLECTOMY      Family History   Problem Relation Age of Onset    Diabetes Mother     Thyroid Disease Mother     Heart Disease Mother     Mental Illness Mother     Diabetes Maternal Grandmother     Heart Disease Maternal Grandmother     Bipolar Disorder Sister     Social History     Socioeconomic History    Marital status: Single     Spouse name: Not on file    Number of children: 1    Years of education: HS    Highest education level: Not on file   Occupational History    Not on file   Tobacco Use    Smoking status: Former     Packs/day: 1     Types: Cigarettes     Quit date: 2020     Years since quittin.1    Smokeless tobacco: Never    Tobacco comments:     Decreased from 2 ppd to 1 ppd since May 2014   Substance and Sexual Activity    Alcohol use: No    Drug use: No    Sexual activity: Not Currently   Other Topics Concern    Not on file   Social History Narrative    Lives on her own.     Social Determinants of Health     Financial Resource Strain: Not on file   Food Insecurity: Not on file   Transportation Needs: Not on file   Physical Activity: Not on file   Stress: Not on file   Social Connections: Not on file   Interpersonal Safety: Not on file   Housing Stability: Not on file          Medications  Allergies   Current Outpatient Medications   Medication Sig Dispense Refill    albuterol (PROAIR HFA/PROVENTIL HFA/VENTOLIN HFA) 108 (90 Base) MCG/ACT inhaler Inhale 2 puffs into the lungs every 6 hours 18 g 11    albuterol (PROVENTIL) (2.5 MG/3ML) 0.083% neb solution Take 1 vial (2.5 mg) by nebulization every  6 hours as needed for shortness of breath or wheezing 90 mL 3    benztropine (COGENTIN) 0.5 MG tablet Take 0.5 mg by mouth At Bedtime      bumetanide (BUMEX) 0.5 MG tablet Take 1 tablet (0.5 mg) by mouth daily 90 tablet 1    cholecalciferol (VITAMIN D3) 125 mcg (5000 units) capsule Take 125 mcg by mouth daily      DULoxetine (CYMBALTA) 60 MG capsule Take 1 capsule by mouth at bedtime      empagliflozin (JARDIANCE) 10 MG TABS tablet Take 1 tablet (10 mg) by mouth daily 90 tablet 1    fluticasone-vilanterol (BREO ELLIPTA) 200-25 MCG/ACT inhaler Inhale 1 puff into the lungs daily 60 each 11    gabapentin (NEURONTIN) 100 MG capsule Take 100 mg by mouth 2 times daily      gabapentin (NEURONTIN) 400 MG capsule Take 400 mg by mouth At Bedtime      methylcellulose (CITRUCEL) 500 MG TABS tablet Take 500 mg by mouth as needed      metoprolol succinate ER (TOPROL XL) 25 MG 24 hr tablet Take 1 tablet (25 mg) by mouth daily 90 tablet 1    nicotine (NICORETTE) 4 MG lozenge Place 4 mg inside cheek as needed for smoking cessation      QUEtiapine (SEROQUEL) 100 MG tablet Take 1.5 tablets by mouth at bedtime      sacubitril-valsartan (ENTRESTO) 24-26 MG per tablet Take 1 tablet by mouth 2 times daily 180 tablet 1    umeclidinium (INCRUSE ELLIPTA) 62.5 MCG/ACT inhaler Inhale 1 puff into the lungs daily 30 each 11    Allergies   Allergen Reactions    Amoxicillin Unknown     As kid had mold test and told allergic     Mold [Molds & Smuts] Unknown    Mold/Mildew [Molds & Smuts] Unknown    Other Environmental Allergy Unknown     DUST    Penicillins Unknown     As kid had mold test and told allergic     Pollen [Pollen Extract] Unknown         Lab Results    Chemistry/lipid CBC Cardiac Enzymes/BNP/TSH/INR   Lab Results   Component Value Date    CHOL 154 09/08/2023    HDL 28 (L) 09/08/2023    TRIG 166 (H) 09/08/2023    BUN 13.7 02/19/2024     02/19/2024    CO2 27 02/19/2024    Lab Results   Component Value Date    WBC 7.5 02/19/2024     HGB 14.9 02/19/2024    HCT 46.2 02/19/2024    MCV 90 02/19/2024     (L) 02/19/2024    Lab Results   Component Value Date    TROPONINI 0.02 01/01/2020    BNP 16 01/01/2020    TSH 0.45 08/27/2023             This note has been dictated using voice recognition software. Any grammatical, typographical, or context distortions are unintentional and inherent to the software    30 minutes spent on the date of encounter doing chart review, review of outside records, review of test results, interpretation with above tests, patient visit, documentation, and discussion with family.                  Thank you for allowing me to participate in the care of your patient.      Sincerely,     JUAN Valladares CNP     [facility]  cc:   JUAN Valladares CNP  [address], [address]  [place] [address]

## 2024-03-18 ENCOUNTER — ANCILLARY PROCEDURE (OUTPATIENT)
Dept: CARDIOLOGY | Facility: CLINIC | Age: 54
End: 2024-03-18
Attending: INTERNAL MEDICINE
Payer: COMMERCIAL

## 2024-03-18 ENCOUNTER — TELEPHONE (OUTPATIENT)
Dept: CARDIOLOGY | Facility: CLINIC | Age: 54
End: 2024-03-18

## 2024-03-18 ENCOUNTER — OFFICE VISIT (OUTPATIENT)
Dept: PULMONOLOGY | Facility: CLINIC | Age: 54
End: 2024-03-18
Payer: COMMERCIAL

## 2024-03-18 VITALS
SYSTOLIC BLOOD PRESSURE: 110 MMHG | DIASTOLIC BLOOD PRESSURE: 68 MMHG | OXYGEN SATURATION: 95 % | WEIGHT: 218 LBS | BODY MASS INDEX: 37.42 KG/M2 | HEART RATE: 85 BPM

## 2024-03-18 DIAGNOSIS — Z95.810 ICD (IMPLANTABLE CARDIOVERTER-DEFIBRILLATOR) IN PLACE: ICD-10-CM

## 2024-03-18 DIAGNOSIS — I50.20 HEART FAILURE WITH REDUCED EJECTION FRACTION (H): Chronic | ICD-10-CM

## 2024-03-18 DIAGNOSIS — G47.33 OSA (OBSTRUCTIVE SLEEP APNEA): ICD-10-CM

## 2024-03-18 DIAGNOSIS — J44.89 ASTHMA-COPD OVERLAP SYNDROME (H): Primary | ICD-10-CM

## 2024-03-18 DIAGNOSIS — Z95.810 ICD (IMPLANTABLE CARDIOVERTER-DEFIBRILLATOR) IN PLACE: Primary | ICD-10-CM

## 2024-03-18 PROCEDURE — 99214 OFFICE O/P EST MOD 30 MIN: CPT | Performed by: NURSE PRACTITIONER

## 2024-03-18 ASSESSMENT — ASTHMA QUESTIONNAIRES
QUESTION_2 LAST FOUR WEEKS HOW OFTEN HAVE YOU HAD SHORTNESS OF BREATH: ONCE A DAY
QUESTION_5 LAST FOUR WEEKS HOW WOULD YOU RATE YOUR ASTHMA CONTROL: WELL CONTROLLED
QUESTION_4 LAST FOUR WEEKS HOW OFTEN HAVE YOU USED YOUR RESCUE INHALER OR NEBULIZER MEDICATION (SUCH AS ALBUTEROL): ONCE A WEEK OR LESS
QUESTION_1 LAST FOUR WEEKS HOW MUCH OF THE TIME DID YOUR ASTHMA KEEP YOU FROM GETTING AS MUCH DONE AT WORK, SCHOOL OR AT HOME: A LITTLE OF THE TIME
ACT_TOTALSCORE: 19
QUESTION_3 LAST FOUR WEEKS HOW OFTEN DID YOUR ASTHMA SYMPTOMS (WHEEZING, COUGHING, SHORTNESS OF BREATH, CHEST TIGHTNESS OR PAIN) WAKE YOU UP AT NIGHT OR EARLIER THAN USUAL IN THE MORNING: NOT AT ALL
ACT_TOTALSCORE: 19

## 2024-03-18 NOTE — PATIENT INSTRUCTIONS
It was a pleasure seeing you in clinic today. This is what we discussed:    Continue your inhalers as you have been.   Remember to reach out to sleep medicine.   Use your albuterol every 4 hours as needed for cough, shortness of breath, wheeze, or chest tightness.   Follow up 6 months   If you have worsening symptoms, questions, or need to speak with the nurse please call [phone].

## 2024-03-18 NOTE — PROGRESS NOTES
Outpatient Pulmonary Clinic Visit  March 18, 2024       Assessment and Plan:  Eva is a 53 year old female with history of COPD/asthma overlap syndrome, SAM not on CPAP recently, tobacco use and obesity     1. Asthma/COPD overlap syndrome: on triple inhaler therapy, continue Breo and Incruse. Reflux and heart failure may be a complicating comorbidity. No exacerbations since our last visit and rare albuterol use. She did have some hemoptysis following ICD implantation and hospital stay last month, resolved.   - Due for LDCT for lung cancer screening in Feb 2025    2. SAM: has not used CPAP in many years due to recall and machine malfunction. She feels her sleep quality is better and does not think she has sleep apnea anymore. We discussed repeating a PSG to determine this and I again strongly encouraged her to follow up with sleep medicine.     3. HFrEF: Followed by cardiology. Recent ED visit for exacerbation. On Bumex and low salt diet. Plan for repeat echo in May.     4. Hypoxic respiratory failure: Not currently using. Previous home oxygen evaluation inpatient shows that patient qualified for home oxygen 1LPM with activity. Previous nocturnal oxygen use.     If her COPD symptoms exacerbate: prednisone 40mg x 5 days, if increased sputum volume or thickness doxycycline 100mg BID x 5 days.     Follow up: 6 months    Dianna Carrasquillo, PRO  Pulmonary Medicine  [facility]   [phone]         History:   Since our last appointment she reports no breathing complaints. Has needed albuterol a few times.   Denies chest tightness or wheeze.   When she forgets her Breo she will notice some tightness across her back.   Occasional reflux symptoms.   She is not using her CPAP. Met with sleep medicine in 09/2023. Cardiology provider has also suggested she use CPAP again.      Action plan given on 7/3/2023 for increased SOB, cough, and sputum with no improvement. She was then hospitalized from 8/26-9/1/2023 with new  onset HFrEF and acute respiratory failure.   Echocardiogram (11/2023) showed severely reduced LVEF 15-20%. Cardiac stress MRI negative for inducible ischemia. She has been taking bumex as prescribed and is compliant with a low salt diet.   Home oxygen evaluation shows that patient qualifies for home oxygen 1LPM with activity in addition to nocturnal oxygen that patient was on prior to admission. She was not wearing her oxygen today for her appointment.         9/13/2023     8:00 AM 3/18/2024     8:00 AM   ACT Total Scores   ACT TOTAL SCORE (Goal Greater than or Equal to 20) 13 19   In the past 12 months, how many times did you visit the emergency room for your asthma without being admitted to the hospital? 0 0   In the past 12 months, how many times were you hospitalized overnight because of your asthma? 0 0     Patient supplied answers from flow sheet for:  COPD Assessment Test (CAT)  2009 [employer]. All rights reserved.      2/21/2020     8:00 AM 8/29/2022    10:00 AM 9/13/2023     9:00 AM 3/18/2024     8:38 AM   COPD assessment test (CAT)   Cough 1 2 1 1   Phlegm 1 5 0 1   Chest tightness 1 3 3 3   Walk up hill 3 5 3 2   Limited activities 3 5 3 2   Leaving my home 3 4 3 2   Sleep 4 5 0 1   Energy 3 3 3 1   Total Score 19 32 16 13            Past Medical History:      Past Medical History:   Diagnosis Date    Acute hypoxemic respiratory failure (H) 08/26/2023    Anxiety     Arthritis     Asthma     COPD (chronic obstructive pulmonary disease) (H)     Depression     Heart failure with reduced ejection fraction (H)     Hepatic cyst 10/30/2017    New onset of congestive heart failure (H) 08/26/2023    Non-ischemic cardiomyopathy (H)     Obese     SAM (obstructive sleep apnea)     Sleep apnea           Past Surgical History:      Past Surgical History:   Procedure Laterality Date    ADENOIDECTOMY      EP ICD INSERT BIVENT N/A 02/12/2024    Procedure: Implantable Cardioverter Defibrillator Device & Lead Implant  Biventricular;  Surgeon: Tierra Elias MD;  Location: [facility] CV    IMPLANT AUTOMATIC IMPLANTABLE CARDIOVERTER DEFIBRILLATOR      TONSILLECTOMY            Social History:     Social History     Tobacco Use    Smoking status: Former     Packs/day: 1.00     Types: Cigarettes     Quit date: 1/1/2020     Years since quitting: 3.7    Smokeless tobacco: Never    Tobacco comments:     Decreased from 2 ppd to 1 ppd since May 2014   Substance Use Topics    Alcohol use: No          Family History:     Family History   Problem Relation Age of Onset    Diabetes Mother     Thyroid Disease Mother     Heart Disease Mother     Mental Illness Mother     Diabetes Maternal Grandmother     Heart Disease Maternal Grandmother     Bipolar Disorder Sister            Allergies:       Allergies   Allergen Reactions    Amoxicillin Unknown     As kid had mold test and told allergic     Mold [Molds & Smuts] Unknown    Mold/Mildew [Molds & Smuts] Unknown    Other Environmental Allergy Unknown     DUST    Penicillins Unknown     As kid had mold test and told allergic     Pollen [Pollen Extract] Unknown          Medications:     Current Outpatient Medications   Medication    albuterol (PROAIR HFA/PROVENTIL HFA/VENTOLIN HFA) 108 (90 Base) MCG/ACT inhaler    albuterol (PROVENTIL) (2.5 MG/3ML) 0.083% neb solution    bumetanide (BUMEX) 0.5 MG tablet    DULoxetine (CYMBALTA) 60 MG capsule    empagliflozin (JARDIANCE) 10 MG TABS tablet    fluticasone-vilanterol (BREO ELLIPTA) 200-25 MCG/ACT inhaler    gabapentin (NEURONTIN) 100 MG capsule    gabapentin (NEURONTIN) 400 MG capsule    methylcellulose (CITRUCEL) 500 MG TABS tablet    metoprolol succinate ER (TOPROL XL) 25 MG 24 hr tablet    nicotine (NICORETTE) 4 MG lozenge    QUEtiapine (SEROQUEL) 100 MG tablet    sacubitril-valsartan (ENTRESTO) 24-26 MG per tablet    umeclidinium (INCRUSE ELLIPTA) 62.5 MCG/ACT inhaler    benztropine (COGENTIN) 0.5 MG tablet    cholecalciferol (VITAMIN  D3) 125 mcg (5000 units) capsule     No current facility-administered medications for this visit.          Review of Systems:   See HPI         Physical Exam:   /68 (BP Location: Right arm, Patient Position: Chair, Cuff Size: Adult Large)   Pulse 85   Wt 98.9 kg (218 lb)   SpO2 95%   BMI 37.42 kg/m      Physical Exam  Constitutional:       General: She is not in acute distress.     Appearance: She is obese. She is not ill-appearing or diaphoretic.   HENT:      Nose: Nose normal.   Cardiovascular:      Rate and Rhythm: Normal rate and regular rhythm.      Pulses: Normal pulses.      Heart sounds: Normal heart sounds.   Pulmonary:      Effort: Pulmonary effort is normal. No respiratory distress.      Breath sounds: No wheezing or rhonchi.   Musculoskeletal:      Right lower leg: Edema (mild) present.      Left lower leg: Edema (mild) present.   Neurological:      Mental Status: She is alert.   Psychiatric:         Behavior: Behavior normal.             Current Laboratory Data:   All laboratory and imaging data reviewed.      CT CHEST PULMONARY EMBOLISM W CONTRAST, DATE: 2/19/2024  INDICATION: Small volume hemoptysis, recent surgical procedure  COMPARISON: Same day 2 view chest radiograph, CT PE chest 08/27/2023  FINDINGS:  ANGIOGRAM CHEST: Pulmonary arteries are upper limits normal caliber and negative for pulmonary emboli. Thoracic aorta is negative for dissection. No CT evidence of right heart strain.  LUNGS AND PLEURA: Emphysematous changes of the lungs. Multiple calcified lung granulomas. Scarring at the right apex. Bibasilar scarring and/or atelectasis.  MEDIASTINUM/AXILLAE: Left chest pacemaker. No pericardial effusion or lymphadenopathy.                                                              IMPRESSION:  1.  No pulmonary embolus or other acute process within the chest.    CT CHEST PULMONARY EMBOLISM W CONTRAST, DATE: 8/27/2023  INDICATION: Dyspnea, hypoxia.  COMPARISON:  01/02/2020.  FINDINGS:  ANGIOGRAM CHEST: Allowing for some mild motion artifact in the lower lungs, nothing definite for pulmonary embolism. Enlargement of the central pulmonary arteries suggest pulmonary arterial hypertension. Thoracic aorta is not opacified well enough to evaluate for dissection. Negative for aneurysm. No CT evidence of right heart strain.  LUNGS AND PLEURA: Numerous benign calcified granulomas in the lungs as well as multiple tiny noncalcified pulmonary nodules also likely related to granulomatous disease. Emphysematous changes in the lungs. Mild hazy groundglass opacities in the lower lungs favored to be due to atelectasis or edema versus less likely infiltrate. Clinical correlation. Scattered areas of subpleural linear scarring and/or atelectasis. Small right and tiny left pleural effusions.  MEDIASTINUM/AXILLAE: No adenopathy. Cardiac enlargement. Trace amount of pericardial fluid. Esophagus is grossly negative.                                                             IMPRESSION:  1.  Negative for pulmonary embolism. Enlargement of the central pulmonary arteries can be seen with pulmonary arterial hypertension.  2.  Cardiac enlargement with small right and tiny left pleural effusions. Correlation for any signs of CHF or fluid overload.  3.  Mild groundglass opacities in the lower lungs favored to be due to atelectasis or edema with infiltrate felt to be less likely. Clinical correlation.  4.  Emphysematous changes in the lungs  5.  Multiple tiny calcified and noncalcified bilateral pulmonary nodules are most compatible with prior granulomatous disease and similar to previous.    Echo 8/26/2023  Interpretation Summary  1. Left ventricular function is decreased. The ejection fraction is 15-20%  (severely reduced). There is severe global hypokinesia of the left ventricle.  2. The right ventricle is normal size. Mildly decreased right ventricular  systolic function  3. The left atrium is  moderately dilated.  4. There is moderate (2+) mitral regurgitation. The mitral regurgitant jet is  eccentrically directed.  5. High RA pressure estimated at 15 mmHg or greater.

## 2024-03-18 NOTE — TELEPHONE ENCOUNTER
"Patient called with concerns, stating, \"I feel like I might've been shocked last night so I sent a remote transmission.\" Se report below for more detail. NO delivery of shock was noted however there was a 21 beat run of VT noted on March, 5th. Patient stated she did not feel any symptoms that day.     Instructed patient to call Device Clinic if She should experience any light headedness, dizziness, fatigue, SOB, palpitations, or weight gain/edema in any extremity's. Patient expressed understanding.    Encounter Type: Courtesy check for patient who manually sent remote on 3/17/2024 d/t reports of, \"feeling like I was shocked while I was sleeping.\"  Device: [employer], Cobalt CRT-D  Pacing %/Programmed: AP- 0.1%, BiVP- 96.5%, DDD 50/130  Battery: Estimated 4.3 years remaining.   Presenting: AS/ @ 90 bpm  Atrial Arrhythmia: since 2/25/2024- none  Anticoagulant: none  Ventricular Arrhythmia: since 2/25/2024- 1 VHR; EGM suggests 21 beat run of VT @ 248 bpm, self terminated, no therapy delivered. EF 20-25% per echo 11/20/23. Patient asymptomatic.   Plan: Routed to ASHVIN MARTINEZ CNP and Dr. Elias for clinical management/review. Patient has 6-week post implant device clinic check scheduled on 3/26/2024.                Americo Taylor RN on 3/18/2024 at 2:18 PM          " DEPRESSIVE SYMPTOMS

## 2024-03-19 LAB
MDC_IDC_EPISODE_DTM: NORMAL
MDC_IDC_EPISODE_DURATION: 4 S
MDC_IDC_EPISODE_ID: 1
MDC_IDC_EPISODE_TYPE: NORMAL
MDC_IDC_LEAD_CONNECTION_STATUS: NORMAL
MDC_IDC_LEAD_IMPLANT_DT: NORMAL
MDC_IDC_LEAD_LOCATION: NORMAL
MDC_IDC_LEAD_LOCATION_DETAIL_1: NORMAL
MDC_IDC_LEAD_MFG: NORMAL
MDC_IDC_LEAD_MODEL: NORMAL
MDC_IDC_LEAD_POLARITY_TYPE: NORMAL
MDC_IDC_LEAD_SERIAL: NORMAL
MDC_IDC_LEAD_SPECIAL_FUNCTION: NORMAL
MDC_IDC_MSMT_BATTERY_DTM: NORMAL
MDC_IDC_MSMT_BATTERY_REMAINING_LONGEVITY: 51 MO
MDC_IDC_MSMT_BATTERY_RRT_TRIGGER: NORMAL
MDC_IDC_MSMT_BATTERY_VOLTAGE: 3.02 V
MDC_IDC_MSMT_CAP_CHARGE_DTM: NORMAL
MDC_IDC_MSMT_CAP_CHARGE_ENERGY: 18 J
MDC_IDC_MSMT_CAP_CHARGE_TIME: 3.5 S
MDC_IDC_MSMT_CAP_CHARGE_TYPE: NORMAL
MDC_IDC_MSMT_LEADCHNL_LV_IMPEDANCE_VALUE: 361 OHM
MDC_IDC_MSMT_LEADCHNL_LV_IMPEDANCE_VALUE: 399 OHM
MDC_IDC_MSMT_LEADCHNL_LV_IMPEDANCE_VALUE: 418 OHM
MDC_IDC_MSMT_LEADCHNL_LV_IMPEDANCE_VALUE: 437 OHM
MDC_IDC_MSMT_LEADCHNL_LV_IMPEDANCE_VALUE: 532 OHM
MDC_IDC_MSMT_LEADCHNL_LV_IMPEDANCE_VALUE: 665 OHM
MDC_IDC_MSMT_LEADCHNL_LV_IMPEDANCE_VALUE: 722 OHM
MDC_IDC_MSMT_LEADCHNL_LV_IMPEDANCE_VALUE: 722 OHM
MDC_IDC_MSMT_LEADCHNL_LV_IMPEDANCE_VALUE: 741 OHM
MDC_IDC_MSMT_LEADCHNL_LV_IMPEDANCE_VALUE: 779 OHM
MDC_IDC_MSMT_LEADCHNL_LV_PACING_THRESHOLD_AMPLITUDE: 1.75 V
MDC_IDC_MSMT_LEADCHNL_LV_PACING_THRESHOLD_PULSEWIDTH: 1 MS
MDC_IDC_MSMT_LEADCHNL_RA_IMPEDANCE_VALUE: 361 OHM
MDC_IDC_MSMT_LEADCHNL_RA_PACING_THRESHOLD_AMPLITUDE: 0.38 V
MDC_IDC_MSMT_LEADCHNL_RA_PACING_THRESHOLD_PULSEWIDTH: 0.4 MS
MDC_IDC_MSMT_LEADCHNL_RA_SENSING_INTR_AMPL: 6.1 MV
MDC_IDC_MSMT_LEADCHNL_RV_IMPEDANCE_VALUE: 342 OHM
MDC_IDC_MSMT_LEADCHNL_RV_IMPEDANCE_VALUE: 418 OHM
MDC_IDC_MSMT_LEADCHNL_RV_PACING_THRESHOLD_AMPLITUDE: 0.75 V
MDC_IDC_MSMT_LEADCHNL_RV_PACING_THRESHOLD_PULSEWIDTH: 0.4 MS
MDC_IDC_MSMT_LEADCHNL_RV_SENSING_INTR_AMPL: 7.3 MV
MDC_IDC_PG_IMPLANT_DTM: NORMAL
MDC_IDC_PG_MFG: NORMAL
MDC_IDC_PG_MODEL: NORMAL
MDC_IDC_PG_SERIAL: NORMAL
MDC_IDC_PG_TYPE: NORMAL
MDC_IDC_SESS_CLINIC_NAME: NORMAL
MDC_IDC_SESS_DTM: NORMAL
MDC_IDC_SESS_TYPE: NORMAL
MDC_IDC_SET_BRADY_AT_MODE_SWITCH_RATE: 171 {BEATS}/MIN
MDC_IDC_SET_BRADY_LOWRATE: 50 {BEATS}/MIN
MDC_IDC_SET_BRADY_MAX_SENSOR_RATE: 130 {BEATS}/MIN
MDC_IDC_SET_BRADY_MAX_TRACKING_RATE: 130 {BEATS}/MIN
MDC_IDC_SET_BRADY_MODE: NORMAL
MDC_IDC_SET_BRADY_PAV_DELAY_LOW: 140 MS
MDC_IDC_SET_BRADY_SAV_DELAY_LOW: 110 MS
MDC_IDC_SET_CRT_LVRV_DELAY: 0 MS
MDC_IDC_SET_CRT_PACED_CHAMBERS: NORMAL
MDC_IDC_SET_LEADCHNL_LV_PACING_AMPLITUDE: 3.75 V
MDC_IDC_SET_LEADCHNL_LV_PACING_ANODE_ELECTRODE_1: NORMAL
MDC_IDC_SET_LEADCHNL_LV_PACING_ANODE_LOCATION_1: NORMAL
MDC_IDC_SET_LEADCHNL_LV_PACING_CAPTURE_MODE: NORMAL
MDC_IDC_SET_LEADCHNL_LV_PACING_CATHODE_ELECTRODE_1: NORMAL
MDC_IDC_SET_LEADCHNL_LV_PACING_CATHODE_LOCATION_1: NORMAL
MDC_IDC_SET_LEADCHNL_LV_PACING_POLARITY: NORMAL
MDC_IDC_SET_LEADCHNL_LV_PACING_PULSEWIDTH: 1 MS
MDC_IDC_SET_LEADCHNL_RA_PACING_AMPLITUDE: 1.5 V
MDC_IDC_SET_LEADCHNL_RA_PACING_ANODE_ELECTRODE_1: NORMAL
MDC_IDC_SET_LEADCHNL_RA_PACING_ANODE_LOCATION_1: NORMAL
MDC_IDC_SET_LEADCHNL_RA_PACING_CAPTURE_MODE: NORMAL
MDC_IDC_SET_LEADCHNL_RA_PACING_CATHODE_ELECTRODE_1: NORMAL
MDC_IDC_SET_LEADCHNL_RA_PACING_CATHODE_LOCATION_1: NORMAL
MDC_IDC_SET_LEADCHNL_RA_PACING_POLARITY: NORMAL
MDC_IDC_SET_LEADCHNL_RA_PACING_PULSEWIDTH: 0.4 MS
MDC_IDC_SET_LEADCHNL_RA_SENSING_ANODE_ELECTRODE_1: NORMAL
MDC_IDC_SET_LEADCHNL_RA_SENSING_ANODE_LOCATION_1: NORMAL
MDC_IDC_SET_LEADCHNL_RA_SENSING_CATHODE_ELECTRODE_1: NORMAL
MDC_IDC_SET_LEADCHNL_RA_SENSING_CATHODE_LOCATION_1: NORMAL
MDC_IDC_SET_LEADCHNL_RA_SENSING_POLARITY: NORMAL
MDC_IDC_SET_LEADCHNL_RA_SENSING_SENSITIVITY: 0.3 MV
MDC_IDC_SET_LEADCHNL_RV_PACING_AMPLITUDE: 2 V
MDC_IDC_SET_LEADCHNL_RV_PACING_ANODE_ELECTRODE_1: NORMAL
MDC_IDC_SET_LEADCHNL_RV_PACING_ANODE_LOCATION_1: NORMAL
MDC_IDC_SET_LEADCHNL_RV_PACING_CAPTURE_MODE: NORMAL
MDC_IDC_SET_LEADCHNL_RV_PACING_CATHODE_ELECTRODE_1: NORMAL
MDC_IDC_SET_LEADCHNL_RV_PACING_CATHODE_LOCATION_1: NORMAL
MDC_IDC_SET_LEADCHNL_RV_PACING_POLARITY: NORMAL
MDC_IDC_SET_LEADCHNL_RV_PACING_PULSEWIDTH: 0.4 MS
MDC_IDC_SET_LEADCHNL_RV_SENSING_ANODE_ELECTRODE_1: NORMAL
MDC_IDC_SET_LEADCHNL_RV_SENSING_ANODE_LOCATION_1: NORMAL
MDC_IDC_SET_LEADCHNL_RV_SENSING_CATHODE_ELECTRODE_1: NORMAL
MDC_IDC_SET_LEADCHNL_RV_SENSING_CATHODE_LOCATION_1: NORMAL
MDC_IDC_SET_LEADCHNL_RV_SENSING_POLARITY: NORMAL
MDC_IDC_SET_LEADCHNL_RV_SENSING_SENSITIVITY: 0.3 MV
MDC_IDC_SET_ZONE_DETECTION_BEATS_DENOMINATOR: 16 {BEATS}
MDC_IDC_SET_ZONE_DETECTION_BEATS_DENOMINATOR: 32 {BEATS}
MDC_IDC_SET_ZONE_DETECTION_BEATS_DENOMINATOR: 40 {BEATS}
MDC_IDC_SET_ZONE_DETECTION_BEATS_NUMERATOR: 16 {BEATS}
MDC_IDC_SET_ZONE_DETECTION_BEATS_NUMERATOR: 30 {BEATS}
MDC_IDC_SET_ZONE_DETECTION_BEATS_NUMERATOR: 32 {BEATS}
MDC_IDC_SET_ZONE_DETECTION_INTERVAL: 270 MS
MDC_IDC_SET_ZONE_DETECTION_INTERVAL: 320 MS
MDC_IDC_SET_ZONE_DETECTION_INTERVAL: 350 MS
MDC_IDC_SET_ZONE_DETECTION_INTERVAL: 360 MS
MDC_IDC_SET_ZONE_DETECTION_INTERVAL: 400 MS
MDC_IDC_SET_ZONE_STATUS: NORMAL
MDC_IDC_SET_ZONE_TYPE: NORMAL
MDC_IDC_SET_ZONE_VENDOR_TYPE: NORMAL
MDC_IDC_STAT_AT_BURDEN_PERCENT: 0 %
MDC_IDC_STAT_AT_DTM_END: NORMAL
MDC_IDC_STAT_AT_DTM_START: NORMAL
MDC_IDC_STAT_BRADY_DTM_END: NORMAL
MDC_IDC_STAT_BRADY_DTM_START: NORMAL
MDC_IDC_STAT_BRADY_RV_PERCENT_PACED: 17.97 %
MDC_IDC_STAT_CRT_DTM_END: NORMAL
MDC_IDC_STAT_CRT_DTM_START: NORMAL
MDC_IDC_STAT_CRT_LV_PERCENT_PACED: 96.47 %
MDC_IDC_STAT_CRT_PERCENT_PACED: 17.93 %
MDC_IDC_STAT_EPISODE_RECENT_COUNT: 0
MDC_IDC_STAT_EPISODE_RECENT_COUNT: 1
MDC_IDC_STAT_EPISODE_RECENT_COUNT_DTM_END: NORMAL
MDC_IDC_STAT_EPISODE_RECENT_COUNT_DTM_START: NORMAL
MDC_IDC_STAT_EPISODE_TOTAL_COUNT: 0
MDC_IDC_STAT_EPISODE_TOTAL_COUNT: 1
MDC_IDC_STAT_EPISODE_TOTAL_COUNT_DTM_END: NORMAL
MDC_IDC_STAT_EPISODE_TOTAL_COUNT_DTM_START: NORMAL
MDC_IDC_STAT_EPISODE_TYPE: NORMAL
MDC_IDC_STAT_TACHYTHERAPY_ATP_DELIVERED_RECENT: 0
MDC_IDC_STAT_TACHYTHERAPY_ATP_DELIVERED_TOTAL: 0
MDC_IDC_STAT_TACHYTHERAPY_RECENT_DTM_END: NORMAL
MDC_IDC_STAT_TACHYTHERAPY_RECENT_DTM_START: NORMAL
MDC_IDC_STAT_TACHYTHERAPY_SHOCKS_ABORTED_RECENT: 0
MDC_IDC_STAT_TACHYTHERAPY_SHOCKS_ABORTED_TOTAL: 0
MDC_IDC_STAT_TACHYTHERAPY_SHOCKS_DELIVERED_RECENT: 0
MDC_IDC_STAT_TACHYTHERAPY_SHOCKS_DELIVERED_TOTAL: 0
MDC_IDC_STAT_TACHYTHERAPY_TOTAL_DTM_END: NORMAL
MDC_IDC_STAT_TACHYTHERAPY_TOTAL_DTM_START: NORMAL

## 2024-03-19 PROCEDURE — 93295 DEV INTERROG REMOTE 1/2/MLT: CPT | Performed by: INTERNAL MEDICINE

## 2024-03-19 PROCEDURE — 93296 REM INTERROG EVL PM/IDS: CPT | Performed by: INTERNAL MEDICINE

## 2024-03-26 ENCOUNTER — ANCILLARY PROCEDURE (OUTPATIENT)
Dept: CARDIOLOGY | Facility: CLINIC | Age: 54
End: 2024-03-26
Attending: INTERNAL MEDICINE
Payer: COMMERCIAL

## 2024-03-26 DIAGNOSIS — Z95.810 ICD (IMPLANTABLE CARDIOVERTER-DEFIBRILLATOR) IN PLACE: ICD-10-CM

## 2024-03-30 LAB
MDC_IDC_LEAD_CONNECTION_STATUS: NORMAL
MDC_IDC_LEAD_IMPLANT_DT: NORMAL
MDC_IDC_LEAD_LOCATION: NORMAL
MDC_IDC_LEAD_LOCATION_DETAIL_1: NORMAL
MDC_IDC_LEAD_MFG: NORMAL
MDC_IDC_LEAD_MODEL: NORMAL
MDC_IDC_LEAD_POLARITY_TYPE: NORMAL
MDC_IDC_LEAD_SERIAL: NORMAL
MDC_IDC_LEAD_SPECIAL_FUNCTION: NORMAL
MDC_IDC_MSMT_BATTERY_DTM: NORMAL
MDC_IDC_MSMT_BATTERY_REMAINING_LONGEVITY: 68 MO
MDC_IDC_MSMT_BATTERY_RRT_TRIGGER: NORMAL
MDC_IDC_MSMT_BATTERY_VOLTAGE: 3.02 V
MDC_IDC_MSMT_CAP_CHARGE_DTM: NORMAL
MDC_IDC_MSMT_CAP_CHARGE_ENERGY: 18 J
MDC_IDC_MSMT_CAP_CHARGE_TIME: 3.5 S
MDC_IDC_MSMT_CAP_CHARGE_TYPE: NORMAL
MDC_IDC_MSMT_LEADCHNL_LV_IMPEDANCE_VALUE: 361 OHM
MDC_IDC_MSMT_LEADCHNL_LV_IMPEDANCE_VALUE: 380 OHM
MDC_IDC_MSMT_LEADCHNL_LV_IMPEDANCE_VALUE: 380 OHM
MDC_IDC_MSMT_LEADCHNL_LV_IMPEDANCE_VALUE: 418 OHM
MDC_IDC_MSMT_LEADCHNL_LV_IMPEDANCE_VALUE: 532 OHM
MDC_IDC_MSMT_LEADCHNL_LV_IMPEDANCE_VALUE: 665 OHM
MDC_IDC_MSMT_LEADCHNL_LV_IMPEDANCE_VALUE: 684 OHM
MDC_IDC_MSMT_LEADCHNL_LV_IMPEDANCE_VALUE: 703 OHM
MDC_IDC_MSMT_LEADCHNL_LV_IMPEDANCE_VALUE: 703 OHM
MDC_IDC_MSMT_LEADCHNL_LV_IMPEDANCE_VALUE: 722 OHM
MDC_IDC_MSMT_LEADCHNL_LV_PACING_THRESHOLD_AMPLITUDE: 1.88 V
MDC_IDC_MSMT_LEADCHNL_LV_PACING_THRESHOLD_AMPLITUDE: 2.75 V
MDC_IDC_MSMT_LEADCHNL_LV_PACING_THRESHOLD_PULSEWIDTH: 1 MS
MDC_IDC_MSMT_LEADCHNL_LV_PACING_THRESHOLD_PULSEWIDTH: 1 MS
MDC_IDC_MSMT_LEADCHNL_RA_IMPEDANCE_VALUE: 361 OHM
MDC_IDC_MSMT_LEADCHNL_RA_PACING_THRESHOLD_AMPLITUDE: 0.5 V
MDC_IDC_MSMT_LEADCHNL_RA_PACING_THRESHOLD_AMPLITUDE: 0.5 V
MDC_IDC_MSMT_LEADCHNL_RA_PACING_THRESHOLD_PULSEWIDTH: 0.4 MS
MDC_IDC_MSMT_LEADCHNL_RA_PACING_THRESHOLD_PULSEWIDTH: 0.4 MS
MDC_IDC_MSMT_LEADCHNL_RA_SENSING_INTR_AMPL: 7.5 MV
MDC_IDC_MSMT_LEADCHNL_RV_IMPEDANCE_VALUE: 342 OHM
MDC_IDC_MSMT_LEADCHNL_RV_IMPEDANCE_VALUE: 418 OHM
MDC_IDC_MSMT_LEADCHNL_RV_PACING_THRESHOLD_AMPLITUDE: 0.62 V
MDC_IDC_MSMT_LEADCHNL_RV_PACING_THRESHOLD_AMPLITUDE: 1 V
MDC_IDC_MSMT_LEADCHNL_RV_PACING_THRESHOLD_PULSEWIDTH: 0.4 MS
MDC_IDC_MSMT_LEADCHNL_RV_PACING_THRESHOLD_PULSEWIDTH: 0.4 MS
MDC_IDC_MSMT_LEADCHNL_RV_SENSING_INTR_AMPL: 10.3 MV
MDC_IDC_PG_IMPLANT_DTM: NORMAL
MDC_IDC_PG_MFG: NORMAL
MDC_IDC_PG_MODEL: NORMAL
MDC_IDC_PG_SERIAL: NORMAL
MDC_IDC_PG_TYPE: NORMAL
MDC_IDC_SESS_CLINIC_NAME: NORMAL
MDC_IDC_SESS_DTM: NORMAL
MDC_IDC_SESS_TYPE: NORMAL
MDC_IDC_SET_BRADY_AT_MODE_SWITCH_RATE: 171 {BEATS}/MIN
MDC_IDC_SET_BRADY_LOWRATE: 50 {BEATS}/MIN
MDC_IDC_SET_BRADY_MAX_SENSOR_RATE: 130 {BEATS}/MIN
MDC_IDC_SET_BRADY_MAX_TRACKING_RATE: 130 {BEATS}/MIN
MDC_IDC_SET_BRADY_MODE: NORMAL
MDC_IDC_SET_BRADY_PAV_DELAY_LOW: 140 MS
MDC_IDC_SET_BRADY_SAV_DELAY_LOW: 100 MS
MDC_IDC_SET_CRT_LVRV_DELAY: 0 MS
MDC_IDC_SET_CRT_PACED_CHAMBERS: NORMAL
MDC_IDC_SET_LEADCHNL_LV_PACING_AMPLITUDE: 3 V
MDC_IDC_SET_LEADCHNL_LV_PACING_ANODE_ELECTRODE_1: NORMAL
MDC_IDC_SET_LEADCHNL_LV_PACING_ANODE_LOCATION_1: NORMAL
MDC_IDC_SET_LEADCHNL_LV_PACING_CAPTURE_MODE: NORMAL
MDC_IDC_SET_LEADCHNL_LV_PACING_CATHODE_ELECTRODE_1: NORMAL
MDC_IDC_SET_LEADCHNL_LV_PACING_CATHODE_LOCATION_1: NORMAL
MDC_IDC_SET_LEADCHNL_LV_PACING_POLARITY: NORMAL
MDC_IDC_SET_LEADCHNL_LV_PACING_PULSEWIDTH: 1 MS
MDC_IDC_SET_LEADCHNL_RA_PACING_AMPLITUDE: 1.5 V
MDC_IDC_SET_LEADCHNL_RA_PACING_ANODE_ELECTRODE_1: NORMAL
MDC_IDC_SET_LEADCHNL_RA_PACING_ANODE_LOCATION_1: NORMAL
MDC_IDC_SET_LEADCHNL_RA_PACING_CAPTURE_MODE: NORMAL
MDC_IDC_SET_LEADCHNL_RA_PACING_CATHODE_ELECTRODE_1: NORMAL
MDC_IDC_SET_LEADCHNL_RA_PACING_CATHODE_LOCATION_1: NORMAL
MDC_IDC_SET_LEADCHNL_RA_PACING_POLARITY: NORMAL
MDC_IDC_SET_LEADCHNL_RA_PACING_PULSEWIDTH: 0.4 MS
MDC_IDC_SET_LEADCHNL_RA_SENSING_ANODE_ELECTRODE_1: NORMAL
MDC_IDC_SET_LEADCHNL_RA_SENSING_ANODE_LOCATION_1: NORMAL
MDC_IDC_SET_LEADCHNL_RA_SENSING_CATHODE_ELECTRODE_1: NORMAL
MDC_IDC_SET_LEADCHNL_RA_SENSING_CATHODE_LOCATION_1: NORMAL
MDC_IDC_SET_LEADCHNL_RA_SENSING_POLARITY: NORMAL
MDC_IDC_SET_LEADCHNL_RA_SENSING_SENSITIVITY: 0.3 MV
MDC_IDC_SET_LEADCHNL_RV_PACING_AMPLITUDE: 2 V
MDC_IDC_SET_LEADCHNL_RV_PACING_ANODE_ELECTRODE_1: NORMAL
MDC_IDC_SET_LEADCHNL_RV_PACING_ANODE_LOCATION_1: NORMAL
MDC_IDC_SET_LEADCHNL_RV_PACING_CAPTURE_MODE: NORMAL
MDC_IDC_SET_LEADCHNL_RV_PACING_CATHODE_ELECTRODE_1: NORMAL
MDC_IDC_SET_LEADCHNL_RV_PACING_CATHODE_LOCATION_1: NORMAL
MDC_IDC_SET_LEADCHNL_RV_PACING_POLARITY: NORMAL
MDC_IDC_SET_LEADCHNL_RV_PACING_PULSEWIDTH: 0.4 MS
MDC_IDC_SET_LEADCHNL_RV_SENSING_ANODE_ELECTRODE_1: NORMAL
MDC_IDC_SET_LEADCHNL_RV_SENSING_ANODE_LOCATION_1: NORMAL
MDC_IDC_SET_LEADCHNL_RV_SENSING_CATHODE_ELECTRODE_1: NORMAL
MDC_IDC_SET_LEADCHNL_RV_SENSING_CATHODE_LOCATION_1: NORMAL
MDC_IDC_SET_LEADCHNL_RV_SENSING_POLARITY: NORMAL
MDC_IDC_SET_LEADCHNL_RV_SENSING_SENSITIVITY: 0.3 MV
MDC_IDC_SET_ZONE_DETECTION_BEATS_DENOMINATOR: 16 {BEATS}
MDC_IDC_SET_ZONE_DETECTION_BEATS_DENOMINATOR: 32 {BEATS}
MDC_IDC_SET_ZONE_DETECTION_BEATS_DENOMINATOR: 40 {BEATS}
MDC_IDC_SET_ZONE_DETECTION_BEATS_NUMERATOR: 16 {BEATS}
MDC_IDC_SET_ZONE_DETECTION_BEATS_NUMERATOR: 30 {BEATS}
MDC_IDC_SET_ZONE_DETECTION_BEATS_NUMERATOR: 32 {BEATS}
MDC_IDC_SET_ZONE_DETECTION_INTERVAL: 270 MS
MDC_IDC_SET_ZONE_DETECTION_INTERVAL: 320 MS
MDC_IDC_SET_ZONE_DETECTION_INTERVAL: 350 MS
MDC_IDC_SET_ZONE_DETECTION_INTERVAL: 360 MS
MDC_IDC_SET_ZONE_DETECTION_INTERVAL: 400 MS
MDC_IDC_SET_ZONE_STATUS: NORMAL
MDC_IDC_SET_ZONE_TYPE: NORMAL
MDC_IDC_SET_ZONE_VENDOR_TYPE: NORMAL
MDC_IDC_STAT_AT_BURDEN_PERCENT: 0 %
MDC_IDC_STAT_AT_DTM_END: NORMAL
MDC_IDC_STAT_AT_DTM_START: NORMAL
MDC_IDC_STAT_BRADY_AP_VP_PERCENT: 0.09 %
MDC_IDC_STAT_BRADY_AP_VS_PERCENT: 0.02 %
MDC_IDC_STAT_BRADY_AS_VP_PERCENT: 96.3 %
MDC_IDC_STAT_BRADY_AS_VS_PERCENT: 3.59 %
MDC_IDC_STAT_BRADY_DTM_END: NORMAL
MDC_IDC_STAT_BRADY_DTM_START: NORMAL
MDC_IDC_STAT_BRADY_RA_PERCENT_PACED: 0.14 %
MDC_IDC_STAT_BRADY_RV_PERCENT_PACED: 14.99 %
MDC_IDC_STAT_CRT_DTM_END: NORMAL
MDC_IDC_STAT_CRT_DTM_START: NORMAL
MDC_IDC_STAT_CRT_LV_PERCENT_PACED: 96.36 %
MDC_IDC_STAT_CRT_PERCENT_PACED: 14.95 %
MDC_IDC_STAT_EPISODE_RECENT_COUNT: 0
MDC_IDC_STAT_EPISODE_RECENT_COUNT: 1
MDC_IDC_STAT_EPISODE_RECENT_COUNT_DTM_END: NORMAL
MDC_IDC_STAT_EPISODE_RECENT_COUNT_DTM_START: NORMAL
MDC_IDC_STAT_EPISODE_TOTAL_COUNT: 0
MDC_IDC_STAT_EPISODE_TOTAL_COUNT: 1
MDC_IDC_STAT_EPISODE_TOTAL_COUNT_DTM_END: NORMAL
MDC_IDC_STAT_EPISODE_TOTAL_COUNT_DTM_START: NORMAL
MDC_IDC_STAT_EPISODE_TYPE: NORMAL
MDC_IDC_STAT_TACHYTHERAPY_ATP_DELIVERED_RECENT: 0
MDC_IDC_STAT_TACHYTHERAPY_ATP_DELIVERED_TOTAL: 0
MDC_IDC_STAT_TACHYTHERAPY_RECENT_DTM_END: NORMAL
MDC_IDC_STAT_TACHYTHERAPY_RECENT_DTM_START: NORMAL
MDC_IDC_STAT_TACHYTHERAPY_SHOCKS_ABORTED_RECENT: 0
MDC_IDC_STAT_TACHYTHERAPY_SHOCKS_ABORTED_TOTAL: 0
MDC_IDC_STAT_TACHYTHERAPY_SHOCKS_DELIVERED_RECENT: 0
MDC_IDC_STAT_TACHYTHERAPY_SHOCKS_DELIVERED_TOTAL: 0
MDC_IDC_STAT_TACHYTHERAPY_TOTAL_DTM_END: NORMAL
MDC_IDC_STAT_TACHYTHERAPY_TOTAL_DTM_START: NORMAL

## 2024-03-30 PROCEDURE — 93284 PRGRMG EVAL IMPLANTABLE DFB: CPT | Performed by: INTERNAL MEDICINE

## 2024-05-13 ENCOUNTER — HOSPITAL ENCOUNTER (OUTPATIENT)
Dept: CARDIOLOGY | Facility: HOSPITAL | Age: 54
Discharge: HOME | End: 2024-05-13
Attending: NURSE PRACTITIONER | Admitting: NURSE PRACTITIONER
Payer: COMMERCIAL

## 2024-05-13 DIAGNOSIS — I50.20 HEART FAILURE WITH REDUCED EJECTION FRACTION (H): Chronic | ICD-10-CM

## 2024-05-13 LAB — LVEF ECHO: NORMAL

## 2024-05-13 PROCEDURE — 93306 TTE W/DOPPLER COMPLETE: CPT | Mod: 26 | Performed by: INTERNAL MEDICINE

## 2024-05-13 PROCEDURE — 255N000002 HC RX 255 OP 636: Performed by: NURSE PRACTITIONER

## 2024-05-13 PROCEDURE — C8929 TTE W OR WO FOL WCON,DOPPLER: HCPCS

## 2024-05-13 RX ADMIN — PERFLUTREN 1.5 ML: 6.52 INJECTION, SUSPENSION INTRAVENOUS at 10:30

## 2024-06-19 ENCOUNTER — OFFICE VISIT (OUTPATIENT)
Dept: CARDIOLOGY | Facility: CLINIC | Age: 54
End: 2024-06-19
Payer: COMMERCIAL

## 2024-06-19 VITALS
RESPIRATION RATE: 16 BRPM | SYSTOLIC BLOOD PRESSURE: 107 MMHG | HEART RATE: 84 BPM | WEIGHT: 212 LBS | BODY MASS INDEX: 36.19 KG/M2 | HEIGHT: 64 IN | DIASTOLIC BLOOD PRESSURE: 60 MMHG

## 2024-06-19 DIAGNOSIS — I50.20 HEART FAILURE WITH REDUCED EJECTION FRACTION (H): Primary | Chronic | ICD-10-CM

## 2024-06-19 LAB — NT-PROBNP SERPL-MCNC: 1427 PG/ML (ref 0–900)

## 2024-06-19 PROCEDURE — 36415 COLL VENOUS BLD VENIPUNCTURE: CPT | Performed by: INTERNAL MEDICINE

## 2024-06-19 PROCEDURE — G2211 COMPLEX E/M VISIT ADD ON: HCPCS | Performed by: INTERNAL MEDICINE

## 2024-06-19 PROCEDURE — 83880 ASSAY OF NATRIURETIC PEPTIDE: CPT | Performed by: INTERNAL MEDICINE

## 2024-06-19 PROCEDURE — 99214 OFFICE O/P EST MOD 30 MIN: CPT | Performed by: INTERNAL MEDICINE

## 2024-06-19 RX ORDER — GABAPENTIN 400 MG/1
400 CAPSULE ORAL AT BEDTIME
COMMUNITY
Start: 2024-04-15

## 2024-06-19 NOTE — LETTER
6/19/2024    Reymundo Avila MD  [address] [place]  [place] [address]    RE: Eva Azulvenkat       Dear Colleague,     I had the pleasure of seeing Eva Parikh in the [facility].    HEART CARE NOTE          Assessment/Recommendations     1. Severe Biventricular failure c/b severe ADHF  Assessment / Plan  NICM; Near euvolemia on physical exam; denies HF symptoms - no changes to regimen at this time  Continue to monitor renal function/repeat BMP, UOP and hemodynamics closely   GDMT as detailed below; s/p CRT-D     Current Pharmacotherapy AHA Guideline-Directed Medical Therapy   Sacubitril- valsartan 24-26 mg BID Lisinopril 20 mg twice daily   Metoprolol succinate 25 mg daily Carvedilol 25 mg twice daily   Spironolactone not started Spironolactone 25 mg once daily   Hydralazine NA Hydralazine 100 mg three times daily   Isosorbide dinitrate NA Isosorbide dinitrate 40 mg three times daily   SGLT2 inhibitor:Empagliflozin - 10 mg daily Dapagliflozin or Empagliflozin 10 mg daily      2. Valvular heart disease  Assessment / Plan  Moderate MR- routine imagine surveillance indicated     3. SAM  Assessment / Plan  CPAP prescribed, but was recalled       35 minutes spent reviewing prior records (including documentation, laboratory studies, cardiac testing/imaging), history and physical exam, planning, and subsequent documentation.    The longitudinal plan of care for HFrEF was addressed during this visit. Due to the added complexity in care, I will continue to support Ms. Eva Parikh  in the subsequent management of this condition(s) and with the ongoing continuity of care of this condition(s).      History of Present Illness/Subjective    Ms. Eva Parikh is a 52 year old female with a PMHx significant for (per Dr. Espino's note) COPD/asthma overlap syndrome, obstructive sleep apnea not using her CPAP with no previous history of cardiac disease found to be in new onset  "ADHF.     Today, Mrs. Parikh denies acute cardiac events or complaints; Management plan as detailed above     ECG: personally reviewed; sinus tachycardia.     ECHO (personnaly Reviewed):   1. Left ventricular function is decreased. The ejection fraction is 15-20%  (severely reduced). There is severe global hypokinesia of the left ventricle.  2. The right ventricle is normal size. Mildly decreased right ventricular  systolic function  3. The left atrium is moderately dilated.  4. There is moderate (2+) mitral regurgitation. The mitral regurgitant jet is  eccentrically directed.  5. High RA pressure estimated at 15 mmHg or greater.    Lab results: personally reviewed June 19, 2024; notable for renal function wnl - NTproBNP repeated    Medical history and pertinent documents reviewed in Care Everywhere please where applicable see details above        Physical Examination Review of Systems   /60 (BP Location: Left arm, Patient Position: Sitting, Cuff Size: Adult Large)   Pulse 84   Resp 16   Ht 1.626 m (5' 4\")   Wt 96.2 kg (212 lb)   BMI 36.39 kg/m    Body mass index is 36.39 kg/m .  Wt Readings from Last 3 Encounters:   06/19/24 96.2 kg (212 lb)   03/18/24 98.9 kg (218 lb)   03/01/24 98.9 kg (218 lb)     General Appearance:   no distress, normal body habitus   ENT/Mouth: membranes moist, no oral lesions or bleeding gums.      EYES:  no scleral icterus, normal conjunctivae   Neck: no carotid bruits or thyromegaly   Chest/Lungs:   lungs are clear to auscultation, no rales or wheezing, equal chest wall expansion    Cardiovascular:   Regular. Normal first and second heart sounds with no murmurs, rubs, or gallops; the carotid, radial and posterior tibial pulses are intact, no JVD or LE edema bilaterally    Abdomen:  no organomegaly, masses, bruits, or tenderness; bowel sounds are present   Extremities: no cyanosis or clubbing   Skin: no xanthelasma, warm.    Neurologic: NAD     Psychiatric: alert and oriented " x3, calm     A complete 10 systems ROS was reviewed  And is negative except what is listed in the HPI.          Medical History  Surgical History Family History Social History   Past Medical History:   Diagnosis Date    Acute hypoxemic respiratory failure (H) 2023    Anxiety     Arthritis     Asthma     COPD (chronic obstructive pulmonary disease) (H)     Depression     Heart failure with reduced ejection fraction (H)     Hepatic cyst 10/30/2017    New onset of congestive heart failure (H) 2023    Non-ischemic cardiomyopathy (H)     Obese     SAM (obstructive sleep apnea)     Sleep apnea     Past Surgical History:   Procedure Laterality Date    ADENOIDECTOMY      EP ICD INSERT BIVENT N/A 2024    Procedure: Implantable Cardioverter Defibrillator Device & Lead Implant Biventricular;  Surgeon: Tierra Elias MD;  Location: [facility] CV    IMPLANT AUTOMATIC IMPLANTABLE CARDIOVERTER DEFIBRILLATOR      TONSILLECTOMY      no family history of premature coronary artery disease Social History     Socioeconomic History    Marital status: Single     Spouse name: Not on file    Number of children: 1    Years of education:     Highest education level: Not on file   Occupational History    Not on file   Tobacco Use    Smoking status: Former     Current packs/day: 0.00     Types: Cigarettes     Quit date: 2020     Years since quittin.4    Smokeless tobacco: Never    Tobacco comments:     Decreased from 2 ppd to 1 ppd since May 2014   Vaping Use    Vaping status: Never Used   Substance and Sexual Activity    Alcohol use: No    Drug use: No    Sexual activity: Not Currently   Other Topics Concern    Not on file   Social History Narrative    Lives on her own.     Social Determinants of Health     Financial Resource Strain: Not on file   Food Insecurity: Not on file   Transportation Needs: Not on file   Physical Activity: Not on file   Stress: Not on file   Social Connections: Not on file    Interpersonal Safety: Not on file   Housing Stability: Not on file           Lab Results    Chemistry/lipid CBC Cardiac Enzymes/BNP/TSH/INR   Lab Results   Component Value Date    CHOL 154 09/08/2023    HDL 28 (L) 09/08/2023    TRIG 166 (H) 09/08/2023    BUN 13.7 02/19/2024     02/19/2024    CO2 27 02/19/2024    Lab Results   Component Value Date    WBC 7.5 02/19/2024    HGB 14.9 02/19/2024    HCT 46.2 02/19/2024    MCV 90 02/19/2024     (L) 02/19/2024    Lab Results   Component Value Date    TROPONINI 0.02 01/01/2020    BNP 16 01/01/2020    TSH 0.45 08/27/2023     Lab Results   Component Value Date    TROPONINI 0.02 01/01/2020          Weight:    Wt Readings from Last 3 Encounters:   03/18/24 98.9 kg (218 lb)   03/01/24 98.9 kg (218 lb)   01/22/24 97.1 kg (214 lb)       Allergies  Allergies   Allergen Reactions    Amoxicillin Unknown     As kid had mold test and told allergic     Mold [Molds & Smuts] Unknown    Mold/Mildew [Molds & Smuts] Unknown    Other Environmental Allergy Unknown     DUST    Penicillins Unknown     As kid had mold test and told allergic     Pollen [Pollen Extract] Unknown         Surgical History  Past Surgical History:   Procedure Laterality Date    ADENOIDECTOMY      EP ICD INSERT BIVENT N/A 02/12/2024    Procedure: Implantable Cardioverter Defibrillator Device & Lead Implant Biventricular;  Surgeon: Tierra Elias MD;  Location: [facility] CV    IMPLANT AUTOMATIC IMPLANTABLE CARDIOVERTER DEFIBRILLATOR      TONSILLECTOMY         Social History  Tobacco:   History   Smoking Status    Former    Packs/day: 1.00    Types: Cigarettes    Quit date: 1/1/2020   Smokeless Tobacco    Never    Alcohol:   Social History    Substance and Sexual Activity      Alcohol use: No   Illicit Drugs:   History   Drug Use No       Family History  Family History   Problem Relation Age of Onset    Diabetes Mother     Thyroid Disease Mother     Heart Disease Mother     Mental Illness  Mother     Diabetes Maternal Grandmother     Heart Disease Maternal Grandmother     Bipolar Disorder Sister           Lauren Vázquez MD on 6/19/2024      cc: Reymundo Avila      Thank you for allowing me to participate in the care of your patient.      Sincerely,     Lauren Vázquez MD     [facility]  cc:   Lauren Vázquez MD  [address] [id number]

## 2024-06-19 NOTE — PROGRESS NOTES
HEART CARE NOTE          Assessment/Recommendations     1. Severe Biventricular failure c/b severe ADHF  Assessment / Plan  NICM; Near euvolemia on physical exam; denies HF symptoms - no changes to regimen at this time  Continue to monitor renal function/repeat BMP, UOP and hemodynamics closely   GDMT as detailed below; s/p CRT-D     Current Pharmacotherapy AHA Guideline-Directed Medical Therapy   Sacubitril- valsartan 24-26 mg BID Lisinopril 20 mg twice daily   Metoprolol succinate 25 mg daily Carvedilol 25 mg twice daily   Spironolactone not started Spironolactone 25 mg once daily   Hydralazine NA Hydralazine 100 mg three times daily   Isosorbide dinitrate NA Isosorbide dinitrate 40 mg three times daily   SGLT2 inhibitor:Empagliflozin - 10 mg daily Dapagliflozin or Empagliflozin 10 mg daily      2. Valvular heart disease  Assessment / Plan  Moderate MR- routine imagine surveillance indicated     3. SAM  Assessment / Plan  CPAP prescribed, but was recalled       35 minutes spent reviewing prior records (including documentation, laboratory studies, cardiac testing/imaging), history and physical exam, planning, and subsequent documentation.    The longitudinal plan of care for HFrEF was addressed during this visit. Due to the added complexity in care, I will continue to support Ms. Eva Parikh  in the subsequent management of this condition(s) and with the ongoing continuity of care of this condition(s).      History of Present Illness/Subjective    Ms. Eva Parikh is a 52 year old female with a PMHx significant for (per Dr. Espino's note) COPD/asthma overlap syndrome, obstructive sleep apnea not using her CPAP with no previous history of cardiac disease found to be in new onset ADHF.     Today, Mrs. Parikh denies acute cardiac events or complaints; Management plan as detailed above     ECG: personally reviewed; sinus tachycardia.     ECHO (personnaly Reviewed):   1. Left ventricular function is  "decreased. The ejection fraction is 15-20%  (severely reduced). There is severe global hypokinesia of the left ventricle.  2. The right ventricle is normal size. Mildly decreased right ventricular  systolic function  3. The left atrium is moderately dilated.  4. There is moderate (2+) mitral regurgitation. The mitral regurgitant jet is  eccentrically directed.  5. High RA pressure estimated at 15 mmHg or greater.    Lab results: personally reviewed June 19, 2024; notable for renal function wnl - NTproBNP repeated    Medical history and pertinent documents reviewed in Care Everywhere please where applicable see details above        Physical Examination Review of Systems   /60 (BP Location: Left arm, Patient Position: Sitting, Cuff Size: Adult Large)   Pulse 84   Resp 16   Ht 1.626 m (5' 4\")   Wt 96.2 kg (212 lb)   BMI 36.39 kg/m    Body mass index is 36.39 kg/m .  Wt Readings from Last 3 Encounters:   06/19/24 96.2 kg (212 lb)   03/18/24 98.9 kg (218 lb)   03/01/24 98.9 kg (218 lb)     General Appearance:   no distress, normal body habitus   ENT/Mouth: membranes moist, no oral lesions or bleeding gums.      EYES:  no scleral icterus, normal conjunctivae   Neck: no carotid bruits or thyromegaly   Chest/Lungs:   lungs are clear to auscultation, no rales or wheezing, equal chest wall expansion    Cardiovascular:   Regular. Normal first and second heart sounds with no murmurs, rubs, or gallops; the carotid, radial and posterior tibial pulses are intact, no JVD or LE edema bilaterally    Abdomen:  no organomegaly, masses, bruits, or tenderness; bowel sounds are present   Extremities: no cyanosis or clubbing   Skin: no xanthelasma, warm.    Neurologic: NAD     Psychiatric: alert and oriented x3, calm     A complete 10 systems ROS was reviewed  And is negative except what is listed in the HPI.          Medical History  Surgical History Family History Social History   Past Medical History:   Diagnosis Date    " Acute hypoxemic respiratory failure (H) 2023    Anxiety     Arthritis     Asthma     COPD (chronic obstructive pulmonary disease) (H)     Depression     Heart failure with reduced ejection fraction (H)     Hepatic cyst 10/30/2017    New onset of congestive heart failure (H) 2023    Non-ischemic cardiomyopathy (H)     Obese     SAM (obstructive sleep apnea)     Sleep apnea     Past Surgical History:   Procedure Laterality Date    ADENOIDECTOMY      EP ICD INSERT BIVENT N/A 2024    Procedure: Implantable Cardioverter Defibrillator Device & Lead Implant Biventricular;  Surgeon: Tierra Elias MD;  Location: [facility] CV    IMPLANT AUTOMATIC IMPLANTABLE CARDIOVERTER DEFIBRILLATOR      TONSILLECTOMY      no family history of premature coronary artery disease Social History     Socioeconomic History    Marital status: Single     Spouse name: Not on file    Number of children: 1    Years of education:     Highest education level: Not on file   Occupational History    Not on file   Tobacco Use    Smoking status: Former     Current packs/day: 0.00     Types: Cigarettes     Quit date: 2020     Years since quittin.4    Smokeless tobacco: Never    Tobacco comments:     Decreased from 2 ppd to 1 ppd since May 2014   Vaping Use    Vaping status: Never Used   Substance and Sexual Activity    Alcohol use: No    Drug use: No    Sexual activity: Not Currently   Other Topics Concern    Not on file   Social History Narrative    Lives on her own.     Social Determinants of Health     Financial Resource Strain: Not on file   Food Insecurity: Not on file   Transportation Needs: Not on file   Physical Activity: Not on file   Stress: Not on file   Social Connections: Not on file   Interpersonal Safety: Not on file   Housing Stability: Not on file           Lab Results    Chemistry/lipid CBC Cardiac Enzymes/BNP/TSH/INR   Lab Results   Component Value Date    CHOL 154 2023    HDL 28 (L)  09/08/2023    TRIG 166 (H) 09/08/2023    BUN 13.7 02/19/2024     02/19/2024    CO2 27 02/19/2024    Lab Results   Component Value Date    WBC 7.5 02/19/2024    HGB 14.9 02/19/2024    HCT 46.2 02/19/2024    MCV 90 02/19/2024     (L) 02/19/2024    Lab Results   Component Value Date    TROPONINI 0.02 01/01/2020    BNP 16 01/01/2020    TSH 0.45 08/27/2023     Lab Results   Component Value Date    TROPONINI 0.02 01/01/2020          Weight:    Wt Readings from Last 3 Encounters:   03/18/24 98.9 kg (218 lb)   03/01/24 98.9 kg (218 lb)   01/22/24 97.1 kg (214 lb)       Allergies  Allergies   Allergen Reactions    Amoxicillin Unknown     As kid had mold test and told allergic     Mold [Molds & Smuts] Unknown    Mold/Mildew [Molds & Smuts] Unknown    Other Environmental Allergy Unknown     DUST    Penicillins Unknown     As kid had mold test and told allergic     Pollen [Pollen Extract] Unknown         Surgical History  Past Surgical History:   Procedure Laterality Date    ADENOIDECTOMY      EP ICD INSERT BIVENT N/A 02/12/2024    Procedure: Implantable Cardioverter Defibrillator Device & Lead Implant Biventricular;  Surgeon: Tierra Elias MD;  Location: [facility] CV    IMPLANT AUTOMATIC IMPLANTABLE CARDIOVERTER DEFIBRILLATOR      TONSILLECTOMY         Social History  Tobacco:   History   Smoking Status    Former    Packs/day: 1.00    Types: Cigarettes    Quit date: 1/1/2020   Smokeless Tobacco    Never    Alcohol:   Social History    Substance and Sexual Activity      Alcohol use: No   Illicit Drugs:   History   Drug Use No       Family History  Family History   Problem Relation Age of Onset    Diabetes Mother     Thyroid Disease Mother     Heart Disease Mother     Mental Illness Mother     Diabetes Maternal Grandmother     Heart Disease Maternal Grandmother     Bipolar Disorder Sister           Lauren Vázquez MD on 6/19/2024      cc: Reymundo Avila

## 2024-06-26 ENCOUNTER — ANCILLARY PROCEDURE (OUTPATIENT)
Dept: CARDIOLOGY | Facility: CLINIC | Age: 54
End: 2024-06-26
Attending: INTERNAL MEDICINE
Payer: COMMERCIAL

## 2024-06-26 DIAGNOSIS — Z95.810 ICD (IMPLANTABLE CARDIOVERTER-DEFIBRILLATOR) IN PLACE: ICD-10-CM

## 2024-06-26 LAB
MDC_IDC_LEAD_CONNECTION_STATUS: NORMAL
MDC_IDC_LEAD_IMPLANT_DT: NORMAL
MDC_IDC_LEAD_LOCATION: NORMAL
MDC_IDC_LEAD_LOCATION_DETAIL_1: NORMAL
MDC_IDC_LEAD_MFG: NORMAL
MDC_IDC_LEAD_MODEL: NORMAL
MDC_IDC_LEAD_POLARITY_TYPE: NORMAL
MDC_IDC_LEAD_SERIAL: NORMAL
MDC_IDC_LEAD_SPECIAL_FUNCTION: NORMAL
MDC_IDC_MSMT_BATTERY_DTM: NORMAL
MDC_IDC_MSMT_BATTERY_REMAINING_LONGEVITY: 44 MO
MDC_IDC_MSMT_BATTERY_RRT_TRIGGER: NORMAL
MDC_IDC_MSMT_BATTERY_VOLTAGE: 2.96 V
MDC_IDC_MSMT_CAP_CHARGE_DTM: NORMAL
MDC_IDC_MSMT_CAP_CHARGE_ENERGY: 18 J
MDC_IDC_MSMT_CAP_CHARGE_TIME: 3.7 S
MDC_IDC_MSMT_CAP_CHARGE_TYPE: NORMAL
MDC_IDC_MSMT_LEADCHNL_LV_IMPEDANCE_VALUE: 380 OHM
MDC_IDC_MSMT_LEADCHNL_LV_IMPEDANCE_VALUE: 399 OHM
MDC_IDC_MSMT_LEADCHNL_LV_IMPEDANCE_VALUE: 399 OHM
MDC_IDC_MSMT_LEADCHNL_LV_IMPEDANCE_VALUE: 475 OHM
MDC_IDC_MSMT_LEADCHNL_LV_IMPEDANCE_VALUE: 494 OHM
MDC_IDC_MSMT_LEADCHNL_LV_IMPEDANCE_VALUE: 703 OHM
MDC_IDC_MSMT_LEADCHNL_LV_IMPEDANCE_VALUE: 703 OHM
MDC_IDC_MSMT_LEADCHNL_LV_IMPEDANCE_VALUE: 798 OHM
MDC_IDC_MSMT_LEADCHNL_LV_IMPEDANCE_VALUE: 817 OHM
MDC_IDC_MSMT_LEADCHNL_LV_IMPEDANCE_VALUE: 817 OHM
MDC_IDC_MSMT_LEADCHNL_LV_PACING_THRESHOLD_AMPLITUDE: 2.75 V
MDC_IDC_MSMT_LEADCHNL_LV_PACING_THRESHOLD_PULSEWIDTH: 1 MS
MDC_IDC_MSMT_LEADCHNL_RA_IMPEDANCE_VALUE: 399 OHM
MDC_IDC_MSMT_LEADCHNL_RA_PACING_THRESHOLD_AMPLITUDE: 0.62 V
MDC_IDC_MSMT_LEADCHNL_RA_PACING_THRESHOLD_PULSEWIDTH: 0.4 MS
MDC_IDC_MSMT_LEADCHNL_RA_SENSING_INTR_AMPL: 4.9 MV
MDC_IDC_MSMT_LEADCHNL_RV_IMPEDANCE_VALUE: 342 OHM
MDC_IDC_MSMT_LEADCHNL_RV_IMPEDANCE_VALUE: 418 OHM
MDC_IDC_MSMT_LEADCHNL_RV_PACING_THRESHOLD_AMPLITUDE: 0.62 V
MDC_IDC_MSMT_LEADCHNL_RV_PACING_THRESHOLD_PULSEWIDTH: 0.4 MS
MDC_IDC_MSMT_LEADCHNL_RV_SENSING_INTR_AMPL: 8.8 MV
MDC_IDC_PG_IMPLANT_DTM: NORMAL
MDC_IDC_PG_MFG: NORMAL
MDC_IDC_PG_MODEL: NORMAL
MDC_IDC_PG_SERIAL: NORMAL
MDC_IDC_PG_TYPE: NORMAL
MDC_IDC_SESS_CLINIC_NAME: NORMAL
MDC_IDC_SESS_DTM: NORMAL
MDC_IDC_SESS_TYPE: NORMAL
MDC_IDC_SET_BRADY_AT_MODE_SWITCH_RATE: 171 {BEATS}/MIN
MDC_IDC_SET_BRADY_LOWRATE: 50 {BEATS}/MIN
MDC_IDC_SET_BRADY_MAX_SENSOR_RATE: 130 {BEATS}/MIN
MDC_IDC_SET_BRADY_MAX_TRACKING_RATE: 130 {BEATS}/MIN
MDC_IDC_SET_BRADY_MODE: NORMAL
MDC_IDC_SET_BRADY_PAV_DELAY_LOW: 140 MS
MDC_IDC_SET_BRADY_SAV_DELAY_LOW: 130 MS
MDC_IDC_SET_CRT_LVRV_DELAY: 0 MS
MDC_IDC_SET_CRT_PACED_CHAMBERS: NORMAL
MDC_IDC_SET_LEADCHNL_LV_PACING_AMPLITUDE: 4 V
MDC_IDC_SET_LEADCHNL_LV_PACING_ANODE_ELECTRODE_1: NORMAL
MDC_IDC_SET_LEADCHNL_LV_PACING_ANODE_LOCATION_1: NORMAL
MDC_IDC_SET_LEADCHNL_LV_PACING_CAPTURE_MODE: NORMAL
MDC_IDC_SET_LEADCHNL_LV_PACING_CATHODE_ELECTRODE_1: NORMAL
MDC_IDC_SET_LEADCHNL_LV_PACING_CATHODE_LOCATION_1: NORMAL
MDC_IDC_SET_LEADCHNL_LV_PACING_POLARITY: NORMAL
MDC_IDC_SET_LEADCHNL_LV_PACING_PULSEWIDTH: 1 MS
MDC_IDC_SET_LEADCHNL_RA_PACING_AMPLITUDE: 1.5 V
MDC_IDC_SET_LEADCHNL_RA_PACING_ANODE_ELECTRODE_1: NORMAL
MDC_IDC_SET_LEADCHNL_RA_PACING_ANODE_LOCATION_1: NORMAL
MDC_IDC_SET_LEADCHNL_RA_PACING_CAPTURE_MODE: NORMAL
MDC_IDC_SET_LEADCHNL_RA_PACING_CATHODE_ELECTRODE_1: NORMAL
MDC_IDC_SET_LEADCHNL_RA_PACING_CATHODE_LOCATION_1: NORMAL
MDC_IDC_SET_LEADCHNL_RA_PACING_POLARITY: NORMAL
MDC_IDC_SET_LEADCHNL_RA_PACING_PULSEWIDTH: 0.4 MS
MDC_IDC_SET_LEADCHNL_RA_SENSING_ANODE_ELECTRODE_1: NORMAL
MDC_IDC_SET_LEADCHNL_RA_SENSING_ANODE_LOCATION_1: NORMAL
MDC_IDC_SET_LEADCHNL_RA_SENSING_CATHODE_ELECTRODE_1: NORMAL
MDC_IDC_SET_LEADCHNL_RA_SENSING_CATHODE_LOCATION_1: NORMAL
MDC_IDC_SET_LEADCHNL_RA_SENSING_POLARITY: NORMAL
MDC_IDC_SET_LEADCHNL_RA_SENSING_SENSITIVITY: 0.3 MV
MDC_IDC_SET_LEADCHNL_RV_PACING_AMPLITUDE: 2 V
MDC_IDC_SET_LEADCHNL_RV_PACING_ANODE_ELECTRODE_1: NORMAL
MDC_IDC_SET_LEADCHNL_RV_PACING_ANODE_LOCATION_1: NORMAL
MDC_IDC_SET_LEADCHNL_RV_PACING_CAPTURE_MODE: NORMAL
MDC_IDC_SET_LEADCHNL_RV_PACING_CATHODE_ELECTRODE_1: NORMAL
MDC_IDC_SET_LEADCHNL_RV_PACING_CATHODE_LOCATION_1: NORMAL
MDC_IDC_SET_LEADCHNL_RV_PACING_POLARITY: NORMAL
MDC_IDC_SET_LEADCHNL_RV_PACING_PULSEWIDTH: 0.4 MS
MDC_IDC_SET_LEADCHNL_RV_SENSING_ANODE_ELECTRODE_1: NORMAL
MDC_IDC_SET_LEADCHNL_RV_SENSING_ANODE_LOCATION_1: NORMAL
MDC_IDC_SET_LEADCHNL_RV_SENSING_CATHODE_ELECTRODE_1: NORMAL
MDC_IDC_SET_LEADCHNL_RV_SENSING_CATHODE_LOCATION_1: NORMAL
MDC_IDC_SET_LEADCHNL_RV_SENSING_POLARITY: NORMAL
MDC_IDC_SET_LEADCHNL_RV_SENSING_SENSITIVITY: 0.3 MV
MDC_IDC_SET_ZONE_DETECTION_BEATS_DENOMINATOR: 16 {BEATS}
MDC_IDC_SET_ZONE_DETECTION_BEATS_DENOMINATOR: 32 {BEATS}
MDC_IDC_SET_ZONE_DETECTION_BEATS_DENOMINATOR: 40 {BEATS}
MDC_IDC_SET_ZONE_DETECTION_BEATS_NUMERATOR: 16 {BEATS}
MDC_IDC_SET_ZONE_DETECTION_BEATS_NUMERATOR: 30 {BEATS}
MDC_IDC_SET_ZONE_DETECTION_BEATS_NUMERATOR: 32 {BEATS}
MDC_IDC_SET_ZONE_DETECTION_INTERVAL: 270 MS
MDC_IDC_SET_ZONE_DETECTION_INTERVAL: 320 MS
MDC_IDC_SET_ZONE_DETECTION_INTERVAL: 350 MS
MDC_IDC_SET_ZONE_DETECTION_INTERVAL: 360 MS
MDC_IDC_SET_ZONE_DETECTION_INTERVAL: 400 MS
MDC_IDC_SET_ZONE_STATUS: NORMAL
MDC_IDC_SET_ZONE_TYPE: NORMAL
MDC_IDC_SET_ZONE_VENDOR_TYPE: NORMAL
MDC_IDC_STAT_AT_BURDEN_PERCENT: 0 %
MDC_IDC_STAT_AT_DTM_END: NORMAL
MDC_IDC_STAT_AT_DTM_START: NORMAL
MDC_IDC_STAT_BRADY_AP_VP_PERCENT: 0.11 %
MDC_IDC_STAT_BRADY_AP_VS_PERCENT: 0.02 %
MDC_IDC_STAT_BRADY_AS_VP_PERCENT: 95.83 %
MDC_IDC_STAT_BRADY_AS_VS_PERCENT: 4.04 %
MDC_IDC_STAT_BRADY_DTM_END: NORMAL
MDC_IDC_STAT_BRADY_DTM_START: NORMAL
MDC_IDC_STAT_BRADY_RA_PERCENT_PACED: 0.17 %
MDC_IDC_STAT_BRADY_RV_PERCENT_PACED: 6.32 %
MDC_IDC_STAT_CRT_DTM_END: NORMAL
MDC_IDC_STAT_CRT_DTM_START: NORMAL
MDC_IDC_STAT_CRT_LV_PERCENT_PACED: 95.92 %
MDC_IDC_STAT_CRT_PERCENT_PACED: 6.29 %
MDC_IDC_STAT_EPISODE_RECENT_COUNT: 0
MDC_IDC_STAT_EPISODE_RECENT_COUNT_DTM_END: NORMAL
MDC_IDC_STAT_EPISODE_RECENT_COUNT_DTM_START: NORMAL
MDC_IDC_STAT_EPISODE_TOTAL_COUNT: 0
MDC_IDC_STAT_EPISODE_TOTAL_COUNT: 1
MDC_IDC_STAT_EPISODE_TOTAL_COUNT_DTM_END: NORMAL
MDC_IDC_STAT_EPISODE_TOTAL_COUNT_DTM_START: NORMAL
MDC_IDC_STAT_EPISODE_TYPE: NORMAL
MDC_IDC_STAT_TACHYTHERAPY_ATP_DELIVERED_RECENT: 0
MDC_IDC_STAT_TACHYTHERAPY_ATP_DELIVERED_TOTAL: 0
MDC_IDC_STAT_TACHYTHERAPY_RECENT_DTM_END: NORMAL
MDC_IDC_STAT_TACHYTHERAPY_RECENT_DTM_START: NORMAL
MDC_IDC_STAT_TACHYTHERAPY_SHOCKS_ABORTED_RECENT: 0
MDC_IDC_STAT_TACHYTHERAPY_SHOCKS_ABORTED_TOTAL: 0
MDC_IDC_STAT_TACHYTHERAPY_SHOCKS_DELIVERED_RECENT: 0
MDC_IDC_STAT_TACHYTHERAPY_SHOCKS_DELIVERED_TOTAL: 0
MDC_IDC_STAT_TACHYTHERAPY_TOTAL_DTM_END: NORMAL
MDC_IDC_STAT_TACHYTHERAPY_TOTAL_DTM_START: NORMAL

## 2024-06-26 PROCEDURE — 93295 DEV INTERROG REMOTE 1/2/MLT: CPT | Performed by: INTERNAL MEDICINE

## 2024-06-26 PROCEDURE — 93296 REM INTERROG EVL PM/IDS: CPT | Performed by: INTERNAL MEDICINE

## 2024-07-03 DIAGNOSIS — I50.9 NEW ONSET OF CONGESTIVE HEART FAILURE (H): ICD-10-CM

## 2024-07-05 RX ORDER — SACUBITRIL AND VALSARTAN 24; 26 MG/1; MG/1
1 TABLET, FILM COATED ORAL 2 TIMES DAILY
Qty: 180 TABLET | Refills: 0 | Status: SHIPPED | OUTPATIENT
Start: 2024-07-05

## 2024-08-15 ENCOUNTER — TELEPHONE (OUTPATIENT)
Dept: CARDIOLOGY | Facility: CLINIC | Age: 54
End: 2024-08-15

## 2024-08-15 DIAGNOSIS — I50.20 HEART FAILURE WITH REDUCED EJECTION FRACTION (H): Primary | Chronic | ICD-10-CM

## 2024-08-15 NOTE — TELEPHONE ENCOUNTER
Patient is advised to move forward with the work up for the Barostim Implant that she will need a Carotid Ultrasound. Orders are placed and she was advised that scheduling will reach out to her to arrange this US. She was informed that all of the testing results will be sent for prior authorization with insurance at that point.    Adriane MINAYA RN BSN, CHFN, PCCN-K

## 2024-08-15 NOTE — TELEPHONE ENCOUNTER
[facility] Call Center    Phone Message    May a detailed message be left on voicemail: yes     Reason for Call: Other: Patient would like to move forward with the Barostim implant, which was mentioned to her at her last visit on June 19th. She would like a call back with how to move forward and what the next steps would be for her.     Action Taken: Other: Cardiology    Travel Screening: Not Applicable     Thank you!  [facility]

## 2024-08-18 ENCOUNTER — HOSPITAL ENCOUNTER (OUTPATIENT)
Dept: ULTRASOUND IMAGING | Facility: HOSPITAL | Age: 54
Discharge: HOME | End: 2024-08-18
Attending: INTERNAL MEDICINE | Admitting: INTERNAL MEDICINE
Payer: COMMERCIAL

## 2024-08-18 DIAGNOSIS — I50.20 HEART FAILURE WITH REDUCED EJECTION FRACTION (H): Chronic | ICD-10-CM

## 2024-08-18 PROCEDURE — 93880 EXTRACRANIAL BILAT STUDY: CPT

## 2024-09-05 DIAGNOSIS — J41.0 SIMPLE CHRONIC BRONCHITIS (H): ICD-10-CM

## 2024-09-05 RX ORDER — FLUTICASONE FUROATE AND VILANTEROL TRIFENATATE 200; 25 UG/1; UG/1
1 POWDER RESPIRATORY (INHALATION) DAILY
Qty: 60 EACH | Refills: 6 | Status: SHIPPED | OUTPATIENT
Start: 2024-09-05 | End: 2024-09-18

## 2024-09-09 ENCOUNTER — TELEPHONE (OUTPATIENT)
Dept: CARDIOLOGY | Facility: CLINIC | Age: 54
End: 2024-09-09
Payer: COMMERCIAL

## 2024-09-09 DIAGNOSIS — I50.20 HEART FAILURE WITH REDUCED EJECTION FRACTION (H): Chronic | ICD-10-CM

## 2024-09-09 RX ORDER — METOPROLOL SUCCINATE 25 MG/1
25 TABLET, EXTENDED RELEASE ORAL DAILY
Qty: 90 TABLET | Refills: 4 | Status: SHIPPED | OUTPATIENT
Start: 2024-09-09

## 2024-09-09 NOTE — TELEPHONE ENCOUNTER
[facility] Call Center    Phone Message    May a detailed message be left on voicemail: yes     Reason for Call: Medication Refill Request    Has the patient contacted the pharmacy for the refill? Yes   Name of medication being requested: metoprolol succinate ER (TOPROL XL) 25 MG 24 hr tablet   Provider who prescribed the medication: marc provider/Gurpreet   Pharmacy:    [id number] [[address]      Date medication is needed: 2 left        Action Taken: Other: cardiology     Travel Screening: Not Applicable    Thank you!  [facility]       Date of Service:

## 2024-09-09 NOTE — TELEPHONE ENCOUNTER
Patient is LM with information that we have submitted her case to Prior [employer] and no response has been received. Let her know that we will let her know as soon as we can.  Adriane MINAYA RN BSN, CHFN, PCCN-K

## 2024-09-09 NOTE — TELEPHONE ENCOUNTER
[facility] Call Center    Phone Message    May a detailed message be left on voicemail: yes     Reason for Call: Other: Patient would like a call back regarding Carotid Ultrasound. Patient stats she is suppose hear about approval on testing. Please reach out to discuss. Thank you       Action Taken: Other: cardiology     Travel Screening: Not Applicable    Thank you!  [facility]       Date of Service:

## 2024-09-18 ENCOUNTER — OFFICE VISIT (OUTPATIENT)
Dept: PULMONOLOGY | Facility: CLINIC | Age: 54
End: 2024-09-18
Attending: NURSE PRACTITIONER
Payer: COMMERCIAL

## 2024-09-18 VITALS
OXYGEN SATURATION: 95 % | HEART RATE: 76 BPM | SYSTOLIC BLOOD PRESSURE: 116 MMHG | DIASTOLIC BLOOD PRESSURE: 68 MMHG | BODY MASS INDEX: 35.36 KG/M2 | WEIGHT: 206 LBS

## 2024-09-18 DIAGNOSIS — J44.89 ASTHMA-COPD OVERLAP SYNDROME (H): Primary | ICD-10-CM

## 2024-09-18 DIAGNOSIS — I50.20 HEART FAILURE WITH REDUCED EJECTION FRACTION (H): ICD-10-CM

## 2024-09-18 DIAGNOSIS — G47.33 OSA (OBSTRUCTIVE SLEEP APNEA): ICD-10-CM

## 2024-09-18 DIAGNOSIS — Z87.891 PERSONAL HISTORY OF TOBACCO USE: ICD-10-CM

## 2024-09-18 PROCEDURE — 99214 OFFICE O/P EST MOD 30 MIN: CPT | Performed by: NURSE PRACTITIONER

## 2024-09-18 PROCEDURE — G0296 VISIT TO DETERM LDCT ELIG: HCPCS | Performed by: NURSE PRACTITIONER

## 2024-09-18 RX ORDER — UMECLIDINIUM 62.5 UG/1
1 AEROSOL, POWDER ORAL DAILY
Qty: 30 EACH | Refills: 11 | Status: SHIPPED | OUTPATIENT
Start: 2024-09-18

## 2024-09-18 RX ORDER — ALBUTEROL SULFATE 90 UG/1
2 AEROSOL, METERED RESPIRATORY (INHALATION) EVERY 6 HOURS PRN
Qty: 18 G | Refills: 4 | Status: SHIPPED | OUTPATIENT
Start: 2024-09-18

## 2024-09-18 RX ORDER — FLUTICASONE FUROATE AND VILANTEROL TRIFENATATE 200; 25 UG/1; UG/1
1 POWDER RESPIRATORY (INHALATION) DAILY
Qty: 60 EACH | Refills: 11 | Status: SHIPPED | OUTPATIENT
Start: 2024-09-18

## 2024-09-18 RX ORDER — ALBUTEROL SULFATE 0.83 MG/ML
2.5 SOLUTION RESPIRATORY (INHALATION) EVERY 6 HOURS PRN
Qty: 90 ML | Refills: 4 | Status: SHIPPED | OUTPATIENT
Start: 2024-09-18

## 2024-09-18 ASSESSMENT — ASTHMA QUESTIONNAIRES
QUESTION_4 LAST FOUR WEEKS HOW OFTEN HAVE YOU USED YOUR RESCUE INHALER OR NEBULIZER MEDICATION (SUCH AS ALBUTEROL): ONCE A WEEK OR LESS
QUESTION_5 LAST FOUR WEEKS HOW WOULD YOU RATE YOUR ASTHMA CONTROL: SOMEWHAT CONTROLLED
ACT_TOTALSCORE: 18
QUESTION_3 LAST FOUR WEEKS HOW OFTEN DID YOUR ASTHMA SYMPTOMS (WHEEZING, COUGHING, SHORTNESS OF BREATH, CHEST TIGHTNESS OR PAIN) WAKE YOU UP AT NIGHT OR EARLIER THAN USUAL IN THE MORNING: NOT AT ALL
QUESTION_1 LAST FOUR WEEKS HOW MUCH OF THE TIME DID YOUR ASTHMA KEEP YOU FROM GETTING AS MUCH DONE AT WORK, SCHOOL OR AT HOME: SOME OF THE TIME
QUESTION_2 LAST FOUR WEEKS HOW OFTEN HAVE YOU HAD SHORTNESS OF BREATH: THREE TO SIX TIMES A WEEK
ACT_TOTALSCORE: 18

## 2024-09-18 NOTE — PROGRESS NOTES
Outpatient Pulmonary Clinic Visit  September 18, 2024       Assessment and Plan:  Eva is a 54 year old female with history of COPD/asthma overlap syndrome, SAM not on CPAP recently, tobacco use and obesity.    ACT is 18, CAT is 15 today.     Last PFTs in 2020 showed severe obstruction with FEV1 of 43%.  There was a significant bronchodilator response.  DLCO was mildly reduced.    #. Asthma/COPD overlap syndrome: Currently well-managed on triple inhaler therapy.no exacerbations since her last visit. Reflux and heart failure may be a complicating comorbidity. Rare albuterol use.   Continue Breo and Incruse.   Albuterol prn   Due for LDCT for lung cancer screening in Feb 2025. Ordered today, see MDM below.   Repeat PFTs prior to next visit.   #. SAM: has not used CPAP in many years due to recall and machine malfunction. She feels her sleep quality is better and does not think she has sleep apnea anymore. We discussed repeating a PSG to determine this and I again strongly encouraged her to follow up with sleep medicine.   #. HFrEF: Followed by cardiology. Recent ED visit for exacerbation. On Bumex and low salt diet. Plan for repeat echo in May.   #. Hypoxic respiratory failure: Not currently using. Previous home oxygen evaluation inpatient shows that patient qualified for home oxygen 1LPM with activity. Previous nocturnal oxygen use.     If her COPD symptoms exacerbate: prednisone 40mg x 5 days, if increased sputum volume or thickness doxycycline 100mg BID x 5 days.     Follow up: 1 year    Dianna Carrasquillo, PRO  Pulmonary Medicine  [facility]   [phone]         History:   Eva was last seen in clinic in 03/2024. Since that time her breathing has been stable.   Has needed albuterol a few times.   Denies chest tightness or wheeze.   When she forgets her Breo she will notice some tightness across her back. Has been using this and Incruse consistently.   Occasional reflux symptoms.   She is not using her  CPAP. Met with sleep medicine in 09/2023. Cardiology provider has also suggested she use CPAP again.      Hospitalized from 8/26-9/1/2023 with new onset HFrEF and acute respiratory failure.   Echocardiogram (11/2023) showed severely reduced LVEF 15-20%. Cardiac stress MRI negative for inducible ischemia. She has been taking bumex as prescribed and is compliant with a low salt diet.   Home oxygen evaluation shows that patient qualifies for home oxygen 1LPM with activity in addition to nocturnal oxygen that patient was on prior to admission. She was not wearing her oxygen today for her appointment.         9/13/2023     8:00 AM 3/18/2024     8:00 AM 9/18/2024    10:56 AM   ACT Total Scores   ACT TOTAL SCORE (Goal Greater than or Equal to 20) 13 19 18   In the past 12 months, how many times did you visit the emergency room for your asthma without being admitted to the hospital? 0 0 0   In the past 12 months, how many times were you hospitalized overnight because of your asthma? 0 0 0         2/21/2020     8:00 AM 8/29/2022    10:00 AM 9/13/2023     9:00 AM 3/18/2024     8:38 AM 9/18/2024    10:54 AM   COPD assessment test (CAT)   Cough 1 2 1 1 1   Phlegm 1 5 0 1 1   Chest tightness 1 3 3 3 1   Walk up hill 3 5 3 2 3   Limited activities 3 5 3 2 3   Leaving my home 3 4 3 2 3   Sleep 4 5 0 1 0   Energy 3 3 3 1 3   Total Score 19 32 16 13 15          Past Medical History:      Past Medical History:   Diagnosis Date    Acute hypoxemic respiratory failure (H) 08/26/2023    Anxiety     Arthritis     Asthma     COPD (chronic obstructive pulmonary disease) (H)     Depression     Heart failure with reduced ejection fraction (H)     Hepatic cyst 10/30/2017    New onset of congestive heart failure (H) 08/26/2023    Non-ischemic cardiomyopathy (H)     Obese     SAM (obstructive sleep apnea)     Sleep apnea           Past Surgical History:      Past Surgical History:   Procedure Laterality Date    ADENOIDECTOMY      EP ICD INSERT  BIVENT N/A 02/12/2024    Procedure: Implantable Cardioverter Defibrillator Device & Lead Implant Biventricular;  Surgeon: Tierra Elias MD;  Location: [facility] CV    IMPLANT AUTOMATIC IMPLANTABLE CARDIOVERTER DEFIBRILLATOR      TONSILLECTOMY            Social History:     Social History     Tobacco Use    Smoking status: Former     Packs/day: 1.00     Types: Cigarettes     Quit date: 1/1/2020     Years since quitting: 3.7    Smokeless tobacco: Never    Tobacco comments:     Decreased from 2 ppd to 1 ppd since May 2014   Substance Use Topics    Alcohol use: No          Family History:     Family History   Problem Relation Age of Onset    Diabetes Mother     Thyroid Disease Mother     Heart Disease Mother     Mental Illness Mother     Diabetes Maternal Grandmother     Heart Disease Maternal Grandmother     Bipolar Disorder Sister            Allergies:       Allergies   Allergen Reactions    Amoxicillin Unknown     As kid had mold test and told allergic     Mold [Molds & Smuts] Unknown    Mold/Mildew [Molds & Smuts] Unknown    Other Environmental Allergy Unknown     DUST    Penicillins Unknown     As kid had mold test and told allergic     Pollen [Pollen Extract] Unknown          Medications:     Current Outpatient Medications   Medication Sig Dispense Refill    albuterol (PROAIR HFA/PROVENTIL HFA/VENTOLIN HFA) 108 (90 Base) MCG/ACT inhaler Inhale 2 puffs into the lungs every 6 hours (Patient taking differently: Inhale 2 puffs into the lungs every 6 hours as needed.) 18 g 11    albuterol (PROVENTIL) (2.5 MG/3ML) 0.083% neb solution Take 1 vial (2.5 mg) by nebulization every 6 hours as needed for shortness of breath or wheezing 90 mL 3    benztropine (COGENTIN) 0.5 MG tablet Take 0.5 mg by mouth at bedtime      BREO ELLIPTA 200-25 MCG/ACT inhaler Inhale 1 puff into the lungs daily 60 each 6    bumetanide (BUMEX) 0.5 MG tablet Take 1 tablet (0.5 mg) by mouth daily 90 tablet 1    DULoxetine (CYMBALTA)  60 MG capsule Take 1 capsule by mouth at bedtime      empagliflozin (JARDIANCE) 10 MG TABS tablet Take 1 tablet (10 mg) by mouth daily 90 tablet 1    ENTRESTO 24-26 MG per tablet Take 1 tablet by mouth 2 times daily 180 tablet 0    gabapentin (NEURONTIN) 100 MG capsule Take 100 mg by mouth 2 times daily.      gabapentin (NEURONTIN) 400 MG capsule Take 400 mg by mouth at bedtime.      methylcellulose (CITRUCEL) 500 MG TABS tablet Take 500 mg by mouth as needed      metoprolol succinate ER (TOPROL XL) 25 MG 24 hr tablet Take 1 tablet (25 mg) by mouth daily. 90 tablet 4    nicotine (NICORETTE) 4 MG lozenge Place 4 mg inside cheek as needed for smoking cessation      QUEtiapine (SEROQUEL) 100 MG tablet Take 1.5 tablets by mouth at bedtime      umeclidinium (INCRUSE ELLIPTA) 62.5 MCG/ACT inhaler Inhale 1 puff into the lungs daily 30 each 11     No current facility-administered medications for this visit.          Review of Systems:   See HPI         Physical Exam:   /68   Pulse 76   Wt 93.4 kg (206 lb)   SpO2 95%   BMI 35.36 kg/m      Physical Exam  Constitutional:       General: She is not in acute distress.     Appearance: She is obese. She is not ill-appearing.   HENT:      Nose: Nose normal.   Cardiovascular:      Rate and Rhythm: Normal rate and regular rhythm.      Heart sounds: Normal heart sounds.   Pulmonary:      Effort: Pulmonary effort is normal. No respiratory distress.      Breath sounds: No wheezing or rhonchi.   Musculoskeletal:      Right lower leg: No edema.      Left lower leg: No edema.   Neurological:      Mental Status: She is alert.   Psychiatric:         Behavior: Behavior normal.             Current Laboratory Data:   All laboratory and imaging data reviewed.      PFTs:    2020:  FEV1/FVC is 58 and is reduced.  FEV1 is 43% predicted and is reduced.  FVC is 59% predicted and is reduced.  There was improvement in spirometry after a single inhaled dose of bronchodilator.  TLC is 104%  predicted and is normal.  RV is 169% predicted and is increased.  DLCO is 61% predicted and is reduced when it   is corrected for hemoglobin.  The flow volume loop is normal No. It is c/w obstructive morphology.     Impression:  Full Pulmonary Function Test is abnormal. Spirometry is consistent with very severe obstructive ventilatory defect.  Spirometry is consistent with reversibility.  There is no hyperinflation.  There is air-trapping.  Diffusion capacity when corrected for hemoglobin is mildly reduced.    2018:  FEV1/FVC is 0.48 and is reduced.  FEV1 is 32% predicted and is reduced.  FVC is 53% predicted and is reduced.  There was improvement in spirometry after a single inhaled dose of bronchodilator.  TLC is 107% predicted and is normal.  RV is 178% predicted and is increased.  DLCO is 83% predicted and is normal when it   is corrected for hemoglobin.  The flow volume loop is normal Yes.     Impression:  Full Pulmonary Function Test is abnormal. Spirometry is consistent with severe obstructive ventilatory defect.  Spirometry is consistent with reversibility.  There is no hyperinflation.  There is air-trapping.  Diffusion capacity when corrected for hemoglobin is normal. DLco results may be questionable since inspiratory volume was <90% of VC with each breath.    CT CHEST PULMONARY EMBOLISM W CONTRAST, DATE: 2/19/2024  INDICATION: Small volume hemoptysis, recent surgical procedure  COMPARISON: Same day 2 view chest radiograph, CT PE chest 08/27/2023  FINDINGS:  ANGIOGRAM CHEST: Pulmonary arteries are upper limits normal caliber and negative for pulmonary emboli. Thoracic aorta is negative for dissection. No CT evidence of right heart strain.  LUNGS AND PLEURA: Emphysematous changes of the lungs. Multiple calcified lung granulomas. Scarring at the right apex. Bibasilar scarring and/or atelectasis.  MEDIASTINUM/AXILLAE: Left chest pacemaker. No pericardial effusion or lymphadenopathy.                                                               IMPRESSION:  1.  No pulmonary embolus or other acute process within the chest.    CT CHEST PULMONARY EMBOLISM W CONTRAST, DATE: 8/27/2023  INDICATION: Dyspnea, hypoxia.  COMPARISON: 01/02/2020.  FINDINGS:  ANGIOGRAM CHEST: Allowing for some mild motion artifact in the lower lungs, nothing definite for pulmonary embolism. Enlargement of the central pulmonary arteries suggest pulmonary arterial hypertension. Thoracic aorta is not opacified well enough to evaluate for dissection. Negative for aneurysm. No CT evidence of right heart strain.  LUNGS AND PLEURA: Numerous benign calcified granulomas in the lungs as well as multiple tiny noncalcified pulmonary nodules also likely related to granulomatous disease. Emphysematous changes in the lungs. Mild hazy groundglass opacities in the lower lungs favored to be due to atelectasis or edema versus less likely infiltrate. Clinical correlation. Scattered areas of subpleural linear scarring and/or atelectasis. Small right and tiny left pleural effusions.  MEDIASTINUM/AXILLAE: No adenopathy. Cardiac enlargement. Trace amount of pericardial fluid. Esophagus is grossly negative.                                                             IMPRESSION:  1.  Negative for pulmonary embolism. Enlargement of the central pulmonary arteries can be seen with pulmonary arterial hypertension.  2.  Cardiac enlargement with small right and tiny left pleural effusions. Correlation for any signs of CHF or fluid overload.  3.  Mild groundglass opacities in the lower lungs favored to be due to atelectasis or edema with infiltrate felt to be less likely. Clinical correlation.  4.  Emphysematous changes in the lungs  5.  Multiple tiny calcified and noncalcified bilateral pulmonary nodules are most compatible with prior granulomatous disease and similar to previous.    Echo 8/26/2023  Interpretation Summary  1. Left ventricular function is decreased. The ejection  fraction is 15-20%  (severely reduced). There is severe global hypokinesia of the left ventricle.  2. The right ventricle is normal size. Mildly decreased right ventricular  systolic function  3. The left atrium is moderately dilated.  4. There is moderate (2+) mitral regurgitation. The mitral regurgitant jet is  eccentrically directed.  5. High RA pressure estimated at 15 mmHg or greater.  Lung Cancer Screening Shared Decision Making Visit     Eva Parikh, a 54 year old female, is eligible for lung cancer screening    History   Smoking Status    Former    Types: Cigarettes   Smokeless Tobacco    Never       I have discussed with patient the risks and benefits of screening for lung cancer with low-dose CT.     The risks include:    radiation exposure: one low dose chest CT has as much ionizing radiation as about 15 chest x-rays, or 6 months of background radiation living in [place]      false positives: most findings/nodules are NOT cancer, but some might still require additional diagnostic evaluation, including biopsy    over-diagnosis: some slow growing cancers that might never have been clinically significant will be detected and treated unnecessarily     The benefit of early detection of lung cancer is contingent upon adherence to annual screening or more frequent follow up if indicated.     Furthermore, to benefit from screening, Eva must be willing and able to undergo diagnostic procedures, if indicated. Although no specific guide is available for determining severity of comorbidities, it is reasonable to withhold screening in patients who have greater mortality risk from other diseases.     We did discuss that the best way to prevent lung cancer is to not smoke.    Some patients may value a numeric estimation of lung cancer risk when evaluating if lung cancer screening is right for them, here is one calculator:    ShouldIScreen

## 2024-09-18 NOTE — PATIENT INSTRUCTIONS
It was a pleasure seeing you in clinic today. This is what we discussed:    Continue the Breo and the Incruse as you have been.   We will get a lung cancer screening scan in Feb 2025.  Let's repeat your lung function test before your next visit.   Use your albuterol every 4 hours as needed for cough, shortness of breath, wheeze, or chest tightness.   Follow up 1 year   If you have worsening symptoms, questions, or need to speak with the nurse please call [phone].     Lung Cancer Screening   Frequently Asked Questions  If you are at high-risk for lung cancer, getting screened with low-dose computed tomography (LDCT) every year can help save your life. This handout offers answers to some of the most common questions about lung cancer screening. If you have other questions, please call [phone] ([phone]).     What is it?  Lung cancer screening uses special X-ray technology to create an image of your lung tissue. The exam is quick and easy and takes less than 10 seconds. We don t give you any medicine or use any needles. You can eat before and after the exam. You don t need to change your clothes as long as the clothing on your chest doesn t contain metal. But, you do need to be able to hold your breath for at least 6 seconds during the exam.    What is the goal of lung cancer screening?  The goal of lung cancer screening is to save lives. Many times, lung cancer is not found until a person starts having physical symptoms. Lung cancer screening can help detect lung cancer in the earliest stages when it may be easier to treat.    Who should be screened for lung cancer?  We suggest lung cancer screening for anyone who is at high-risk for lung cancer. You are in the high-risk group if you:     are between the ages of 55 and 79, and   have smoked at least 1 pack of cigarettes a day for 20 or more years, and   still smoke or have quit within the past 15 years.    However, if you have a new cough or  shortness of breath, you should talk to your doctor before being screened.    Why does it matter if I have symptoms?  Certain symptoms can be a sign that you have a condition in your lungs that should be checked and treated by your doctor. These symptoms include fever, chest pain, a new or changing cough, shortness of breath that you have never felt before, coughing up blood or unexplained weight loss. Having any of these symptoms can greatly affect the results of lung cancer screening.       Should all smokers get an LDCT lung cancer screening exam?  It depends. Lung cancer screening is for a very specific group of men and women who have a history of heavy smoking over a long period of time (see  Who should be screened for lung cancer  above).  I am in the high-risk group, but have been diagnosed with cancer in the past. Is LDCT lung cancer screening right for me?  In some cases, you should not have LDCT lung screening, such as when your doctor is already following your cancer with CT scan studies. Your doctor will help you decide if LDCT lung screening is right for you.  Do I need to have a screening exam every year?  Yes. If you are in the high-risk group described earlier, you should get an LDCT lung cancer screening exam every year until you are 79, or are no longer willing or able to undergo screening and possible procedures to diagnose and treat lung cancer.  How effective is LDCT at preventing death from lung cancer?  Studies have shown that LDCT lung cancer screening can lower the risk of death from lung cancer by 20 percent in people who are at high-risk.  What are the risks?  There are some risks and limitations of LDCT lung cancer screening. We want to make sure you understand the risks and benefits, so please let us know if you have any questions. Your doctor may want to talk with you more about these risks.   Radiation exposure: As with any exam that uses radiation, there is a very small increased risk  of cancer. The amount of radiation in LDCT is small--about the same amount a person would get from a mammogram. Your doctor orders the exam when he or she feels the potential benefits outweigh the risks.   False negatives: No test is perfect, including LDCT. It is possible that you may have a medical condition, including lung cancer, that is not found during your exam. This is called a false negative result.   False positives and more testing: LDCT very often finds something in the lung that could be cancer, but in fact is not. This is called a false positive result. False positive tests often cause anxiety. To make sure these findings are not cancer, you may need to have more tests. These tests will be done only if you give us permission. Sometimes patients need a treatment that can have side effects, such as a biopsy. For more information on false positives, see  What can I expect from the results?    Findings not related to lung cancer: Your LDCT exam also takes pictures of areas of your body next to your lungs. In a very small number of cases, the CT scan will show an abnormal finding in one of these areas, such as your kidneys, adrenal glands, liver or thyroid. This finding may not be serious, but you may need more tests. Your doctor can help you decide what other tests you may need, if any.  What can I expect from the results?  About 1 out of 4 LDCT exams will find something that may need more tests. Most of the time, these findings are lung nodules. Lung nodules are very small collections of tissue in the lung. These nodules are very common, and the vast majority--more than 97 percent--are not cancer (benign). Most are normal lymph nodes or small areas of scarring from past infections.  But, if a small lung nodule is found to be cancer, the cancer can be cured more than 90 percent of the time. To know if the nodule is cancer, we may need to get more images before your next yearly screening exam. If the nodule  has suspicious features (for example, it is large, has an odd shape or grows over time), we will refer you to a specialist for further testing.  Will my doctor also get the results?  Yes. Your doctor will get a copy of your results.  Is it okay to keep smoking now that there s a cancer screening exam?  No. Tobacco is one of the strongest cancer-causing agents. It causes not only lung cancer, but other cancers and cardiovascular (heart) diseases as well. The damage caused by smoking builds over time. This means that the longer you smoke, the higher your risk of disease. While it is never too late to quit, the sooner you quit, the better.  Where can I find help to quit smoking?  The best way to prevent lung cancer is to stop smoking. If you have already quit smoking, congratulations and keep it up! For help on quitting smoking, please call [employer] at 2-400-QUITNOW (1-984.804.9036) or the American [employer] at 1-626.496.8002 to find local resources near you.  One-on-one health coaching:  If you d prefer to work individually with a health care provider on tobacco cessation, we offer:     Medication Therapy Management:  Our specially trained pharmacists work closely with you and your doctor to help you quit smoking.  Call [phone] or [phone] (toll free).

## 2024-10-02 ENCOUNTER — TELEPHONE (OUTPATIENT)
Dept: CARDIOLOGY | Facility: CLINIC | Age: 54
End: 2024-10-02

## 2024-10-02 ENCOUNTER — ANCILLARY PROCEDURE (OUTPATIENT)
Dept: CARDIOLOGY | Facility: CLINIC | Age: 54
End: 2024-10-02
Attending: INTERNAL MEDICINE
Payer: COMMERCIAL

## 2024-10-02 DIAGNOSIS — Z95.810 ICD (IMPLANTABLE CARDIOVERTER-DEFIBRILLATOR) IN PLACE: ICD-10-CM

## 2024-10-02 NOTE — TELEPHONE ENCOUNTER
----- Message from Ann Palmer sent at 10/2/2024  3:12 PM CDT -----  Regarding: Device RN Review  Encounter Type: Routine remote CRT-D transmission.   Device: [employer] Cobalt XT HF Quad.   Pacing %/Programmed: AP <1%, RVP 4%, LVP 95% at DDD 50/130 bpm.   Lead(s): Stable measurements and trends.   Battery longevity: 2 years, 9 months estimated.   Presenting: AS BiVP 98 bpm   Atrial high rates: Since 6/26/2024 none detected.   Anticoagulant: None.  Ventricular High rates: Since 6/26/2024 none detected.   Comments: Normal device function. About 1 year battery depletion since 6/26/24.   Plan: Routed to device RN for review. Next remote check scheduled for 1/17/2025. Letter sent. Tanya, Device Specialist      Patient sent remote in this afternoon wanting to see if she had an shocks from ICD. Said she had a sharp pain in her left breast yesterday and wanted to make sure it wasn't a shock. Reviewed with patient that there were no shock/rhythm issues recorded. She is happy to hear this.     We also discussed that her battery is depleting fairly fast and might because of LV lead outputs. Reviewed with her that we would have [employer] support review this and get back to her tomorrow. Patient verbalized understanding and denies any other question at this time.

## 2024-10-03 NOTE — TELEPHONE ENCOUNTER
"Call placed to [employer] TS/Engineering to review continued rapid battery depletion. Per TS (Lorenzo) - the battery is tracking as expected given the current conditions with LV output high and lower impedances- If able to get Voltage or Pulse Width down at all this would help regain some longevity (Current output 4.25V @ 1 ms LV2 to RV coil)       At Post op on 2/21/24 [employer] was done with [employer] rep.     \"Today's LV testing done with [employer] Rep, see vector express outcomes in attached PDF. Changes made: LV amplitude changed to 3.5 V from 6 V, pacing polarity changed from LV4 to RVcoil --> LV2 to RVcoil. Battery life now estimated @ 4.4 years. Per [employer] rep, \"I will update Dr. Elias with this today.\"        Will have patient come back to see if there is a better configuration for pacing due to increased battery consumption in current configuration. Per chart above it appears that LV1--> LV2 or LV3 would be better options with lower Thresholds and higher impedances.    Chelle Shannon RN    "

## 2024-10-07 ENCOUNTER — ANCILLARY PROCEDURE (OUTPATIENT)
Dept: CARDIOLOGY | Facility: CLINIC | Age: 54
End: 2024-10-07
Attending: INTERNAL MEDICINE
Payer: COMMERCIAL

## 2024-10-07 DIAGNOSIS — Z95.810 ICD (IMPLANTABLE CARDIOVERTER-DEFIBRILLATOR) IN PLACE: ICD-10-CM

## 2024-10-14 LAB
MDC_IDC_LEAD_CONNECTION_STATUS: NORMAL
MDC_IDC_LEAD_IMPLANT_DT: NORMAL
MDC_IDC_LEAD_LOCATION: NORMAL
MDC_IDC_LEAD_LOCATION_DETAIL_1: NORMAL
MDC_IDC_LEAD_MFG: NORMAL
MDC_IDC_LEAD_MODEL: NORMAL
MDC_IDC_LEAD_POLARITY_TYPE: NORMAL
MDC_IDC_LEAD_SERIAL: NORMAL
MDC_IDC_LEAD_SPECIAL_FUNCTION: NORMAL
MDC_IDC_MSMT_BATTERY_DTM: NORMAL
MDC_IDC_MSMT_BATTERY_REMAINING_LONGEVITY: 71 MO
MDC_IDC_MSMT_BATTERY_RRT_TRIGGER: NORMAL
MDC_IDC_MSMT_BATTERY_VOLTAGE: 2.95 V
MDC_IDC_MSMT_CAP_CHARGE_DTM: NORMAL
MDC_IDC_MSMT_CAP_CHARGE_ENERGY: 18 J
MDC_IDC_MSMT_CAP_CHARGE_TIME: 3.8 S
MDC_IDC_MSMT_CAP_CHARGE_TYPE: NORMAL
MDC_IDC_MSMT_LEADCHNL_LV_IMPEDANCE_VALUE: 380 OHM
MDC_IDC_MSMT_LEADCHNL_LV_IMPEDANCE_VALUE: 399 OHM
MDC_IDC_MSMT_LEADCHNL_LV_IMPEDANCE_VALUE: 418 OHM
MDC_IDC_MSMT_LEADCHNL_LV_IMPEDANCE_VALUE: 494 OHM
MDC_IDC_MSMT_LEADCHNL_LV_IMPEDANCE_VALUE: 513 OHM
MDC_IDC_MSMT_LEADCHNL_LV_IMPEDANCE_VALUE: 551 OHM
MDC_IDC_MSMT_LEADCHNL_LV_IMPEDANCE_VALUE: 703 OHM
MDC_IDC_MSMT_LEADCHNL_LV_IMPEDANCE_VALUE: 722 OHM
MDC_IDC_MSMT_LEADCHNL_LV_IMPEDANCE_VALUE: 817 OHM
MDC_IDC_MSMT_LEADCHNL_LV_IMPEDANCE_VALUE: 836 OHM
MDC_IDC_MSMT_LEADCHNL_LV_IMPEDANCE_VALUE: 836 OHM
MDC_IDC_MSMT_LEADCHNL_LV_PACING_THRESHOLD_AMPLITUDE: 1.75 V
MDC_IDC_MSMT_LEADCHNL_LV_PACING_THRESHOLD_AMPLITUDE: 1.75 V
MDC_IDC_MSMT_LEADCHNL_LV_PACING_THRESHOLD_AMPLITUDE: 2.25 V
MDC_IDC_MSMT_LEADCHNL_LV_PACING_THRESHOLD_AMPLITUDE: 3 V
MDC_IDC_MSMT_LEADCHNL_LV_PACING_THRESHOLD_AMPLITUDE: 3 V
MDC_IDC_MSMT_LEADCHNL_LV_PACING_THRESHOLD_PULSEWIDTH: 1 MS
MDC_IDC_MSMT_LEADCHNL_RA_IMPEDANCE_VALUE: 380 OHM
MDC_IDC_MSMT_LEADCHNL_RA_IMPEDANCE_VALUE: 399 OHM
MDC_IDC_MSMT_LEADCHNL_RA_PACING_THRESHOLD_AMPLITUDE: 0.5 V
MDC_IDC_MSMT_LEADCHNL_RA_PACING_THRESHOLD_AMPLITUDE: 0.62 V
MDC_IDC_MSMT_LEADCHNL_RA_PACING_THRESHOLD_PULSEWIDTH: 0.4 MS
MDC_IDC_MSMT_LEADCHNL_RA_PACING_THRESHOLD_PULSEWIDTH: 0.4 MS
MDC_IDC_MSMT_LEADCHNL_RA_SENSING_INTR_AMPL: 6.5 MV
MDC_IDC_MSMT_LEADCHNL_RA_SENSING_INTR_AMPL: 6.5 MV
MDC_IDC_MSMT_LEADCHNL_RV_IMPEDANCE_VALUE: 342 OHM
MDC_IDC_MSMT_LEADCHNL_RV_IMPEDANCE_VALUE: 456 OHM
MDC_IDC_MSMT_LEADCHNL_RV_IMPEDANCE_VALUE: 475 OHM
MDC_IDC_MSMT_LEADCHNL_RV_PACING_THRESHOLD_AMPLITUDE: 0.75 V
MDC_IDC_MSMT_LEADCHNL_RV_PACING_THRESHOLD_AMPLITUDE: 0.75 V
MDC_IDC_MSMT_LEADCHNL_RV_PACING_THRESHOLD_PULSEWIDTH: 0.4 MS
MDC_IDC_MSMT_LEADCHNL_RV_PACING_THRESHOLD_PULSEWIDTH: 0.4 MS
MDC_IDC_MSMT_LEADCHNL_RV_SENSING_INTR_AMPL: 13.1 MV
MDC_IDC_MSMT_LEADCHNL_RV_SENSING_INTR_AMPL: 13.1 MV
MDC_IDC_PG_IMPLANT_DTM: NORMAL
MDC_IDC_PG_MFG: NORMAL
MDC_IDC_PG_MODEL: NORMAL
MDC_IDC_PG_SERIAL: NORMAL
MDC_IDC_PG_TYPE: NORMAL
MDC_IDC_SESS_CLINIC_NAME: NORMAL
MDC_IDC_SESS_DTM: NORMAL
MDC_IDC_SESS_TYPE: NORMAL
MDC_IDC_SET_BRADY_AT_MODE_SWITCH_RATE: 171 {BEATS}/MIN
MDC_IDC_SET_BRADY_LOWRATE: 50 {BEATS}/MIN
MDC_IDC_SET_BRADY_MAX_SENSOR_RATE: 130 {BEATS}/MIN
MDC_IDC_SET_BRADY_MAX_TRACKING_RATE: 130 {BEATS}/MIN
MDC_IDC_SET_BRADY_MODE: NORMAL
MDC_IDC_SET_BRADY_PAV_DELAY_LOW: 130 MS
MDC_IDC_SET_BRADY_SAV_DELAY_LOW: 110 MS
MDC_IDC_SET_CRT_LVRV_DELAY: 0 MS
MDC_IDC_SET_CRT_PACED_CHAMBERS: NORMAL
MDC_IDC_SET_LEADCHNL_LV_PACING_AMPLITUDE: NORMAL
MDC_IDC_SET_LEADCHNL_LV_PACING_ANODE_ELECTRODE_1: NORMAL
MDC_IDC_SET_LEADCHNL_LV_PACING_ANODE_LOCATION_1: NORMAL
MDC_IDC_SET_LEADCHNL_LV_PACING_CAPTURE_MODE: NORMAL
MDC_IDC_SET_LEADCHNL_LV_PACING_CATHODE_ELECTRODE_1: NORMAL
MDC_IDC_SET_LEADCHNL_LV_PACING_CATHODE_LOCATION_1: NORMAL
MDC_IDC_SET_LEADCHNL_LV_PACING_POLARITY: NORMAL
MDC_IDC_SET_LEADCHNL_LV_PACING_PULSEWIDTH: 1 MS
MDC_IDC_SET_LEADCHNL_RA_PACING_AMPLITUDE: NORMAL
MDC_IDC_SET_LEADCHNL_RA_PACING_ANODE_ELECTRODE_1: NORMAL
MDC_IDC_SET_LEADCHNL_RA_PACING_ANODE_LOCATION_1: NORMAL
MDC_IDC_SET_LEADCHNL_RA_PACING_CAPTURE_MODE: NORMAL
MDC_IDC_SET_LEADCHNL_RA_PACING_CATHODE_ELECTRODE_1: NORMAL
MDC_IDC_SET_LEADCHNL_RA_PACING_CATHODE_LOCATION_1: NORMAL
MDC_IDC_SET_LEADCHNL_RA_PACING_POLARITY: NORMAL
MDC_IDC_SET_LEADCHNL_RA_PACING_PULSEWIDTH: 0.4 MS
MDC_IDC_SET_LEADCHNL_RA_SENSING_ANODE_ELECTRODE_1: NORMAL
MDC_IDC_SET_LEADCHNL_RA_SENSING_ANODE_LOCATION_1: NORMAL
MDC_IDC_SET_LEADCHNL_RA_SENSING_CATHODE_ELECTRODE_1: NORMAL
MDC_IDC_SET_LEADCHNL_RA_SENSING_CATHODE_LOCATION_1: NORMAL
MDC_IDC_SET_LEADCHNL_RA_SENSING_POLARITY: NORMAL
MDC_IDC_SET_LEADCHNL_RA_SENSING_SENSITIVITY: 0.3 MV
MDC_IDC_SET_LEADCHNL_RV_PACING_AMPLITUDE: NORMAL
MDC_IDC_SET_LEADCHNL_RV_PACING_ANODE_ELECTRODE_1: NORMAL
MDC_IDC_SET_LEADCHNL_RV_PACING_ANODE_LOCATION_1: NORMAL
MDC_IDC_SET_LEADCHNL_RV_PACING_CAPTURE_MODE: NORMAL
MDC_IDC_SET_LEADCHNL_RV_PACING_CATHODE_ELECTRODE_1: NORMAL
MDC_IDC_SET_LEADCHNL_RV_PACING_CATHODE_LOCATION_1: NORMAL
MDC_IDC_SET_LEADCHNL_RV_PACING_POLARITY: NORMAL
MDC_IDC_SET_LEADCHNL_RV_PACING_PULSEWIDTH: 0.4 MS
MDC_IDC_SET_LEADCHNL_RV_SENSING_ANODE_ELECTRODE_1: NORMAL
MDC_IDC_SET_LEADCHNL_RV_SENSING_ANODE_LOCATION_1: NORMAL
MDC_IDC_SET_LEADCHNL_RV_SENSING_CATHODE_ELECTRODE_1: NORMAL
MDC_IDC_SET_LEADCHNL_RV_SENSING_CATHODE_LOCATION_1: NORMAL
MDC_IDC_SET_LEADCHNL_RV_SENSING_POLARITY: NORMAL
MDC_IDC_SET_LEADCHNL_RV_SENSING_SENSITIVITY: 0.3 MV
MDC_IDC_SET_ZONE_DETECTION_BEATS_DENOMINATOR: 16 {BEATS}
MDC_IDC_SET_ZONE_DETECTION_BEATS_DENOMINATOR: 32 {BEATS}
MDC_IDC_SET_ZONE_DETECTION_BEATS_DENOMINATOR: 40 {BEATS}
MDC_IDC_SET_ZONE_DETECTION_BEATS_NUMERATOR: 16 {BEATS}
MDC_IDC_SET_ZONE_DETECTION_BEATS_NUMERATOR: 30 {BEATS}
MDC_IDC_SET_ZONE_DETECTION_BEATS_NUMERATOR: 32 {BEATS}
MDC_IDC_SET_ZONE_DETECTION_INTERVAL: 270 MS
MDC_IDC_SET_ZONE_DETECTION_INTERVAL: 320 MS
MDC_IDC_SET_ZONE_DETECTION_INTERVAL: 350 MS
MDC_IDC_SET_ZONE_DETECTION_INTERVAL: 360 MS
MDC_IDC_SET_ZONE_DETECTION_INTERVAL: 400 MS
MDC_IDC_SET_ZONE_STATUS: NORMAL
MDC_IDC_SET_ZONE_TYPE: NORMAL
MDC_IDC_SET_ZONE_VENDOR_TYPE: NORMAL
MDC_IDC_STAT_AT_BURDEN_PERCENT: 0 %
MDC_IDC_STAT_AT_DTM_END: NORMAL
MDC_IDC_STAT_AT_DTM_START: NORMAL
MDC_IDC_STAT_AT_MODE_SW_COUNT: 0
MDC_IDC_STAT_BRADY_AP_VP_PERCENT: 0.24 %
MDC_IDC_STAT_BRADY_AP_VS_PERCENT: 0.02 %
MDC_IDC_STAT_BRADY_AS_VP_PERCENT: 95.09 %
MDC_IDC_STAT_BRADY_AS_VS_PERCENT: 4.66 %
MDC_IDC_STAT_BRADY_DTM_END: NORMAL
MDC_IDC_STAT_BRADY_DTM_START: NORMAL
MDC_IDC_STAT_BRADY_RA_PERCENT_PACED: 0.34 %
MDC_IDC_STAT_BRADY_RV_PERCENT_PACED: 5.11 %
MDC_IDC_STAT_CRT_DTM_END: NORMAL
MDC_IDC_STAT_CRT_DTM_START: NORMAL
MDC_IDC_STAT_CRT_LV_PERCENT_PACED: 95.3 %
MDC_IDC_STAT_CRT_PERCENT_PACED: 5.08 %
MDC_IDC_STAT_EPISODE_RECENT_COUNT: 0
MDC_IDC_STAT_EPISODE_RECENT_COUNT_DTM_END: NORMAL
MDC_IDC_STAT_EPISODE_RECENT_COUNT_DTM_START: NORMAL
MDC_IDC_STAT_EPISODE_TOTAL_COUNT: 0
MDC_IDC_STAT_EPISODE_TOTAL_COUNT: 1
MDC_IDC_STAT_EPISODE_TOTAL_COUNT_DTM_END: NORMAL
MDC_IDC_STAT_EPISODE_TOTAL_COUNT_DTM_START: NORMAL
MDC_IDC_STAT_EPISODE_TYPE: NORMAL
MDC_IDC_STAT_TACHYTHERAPY_ATP_DELIVERED_RECENT: 0
MDC_IDC_STAT_TACHYTHERAPY_ATP_DELIVERED_TOTAL: 0
MDC_IDC_STAT_TACHYTHERAPY_RECENT_DTM_END: NORMAL
MDC_IDC_STAT_TACHYTHERAPY_RECENT_DTM_START: NORMAL
MDC_IDC_STAT_TACHYTHERAPY_SHOCKS_ABORTED_RECENT: 0
MDC_IDC_STAT_TACHYTHERAPY_SHOCKS_ABORTED_TOTAL: 0
MDC_IDC_STAT_TACHYTHERAPY_SHOCKS_DELIVERED_RECENT: 0
MDC_IDC_STAT_TACHYTHERAPY_SHOCKS_DELIVERED_TOTAL: 0
MDC_IDC_STAT_TACHYTHERAPY_TOTAL_DTM_END: NORMAL
MDC_IDC_STAT_TACHYTHERAPY_TOTAL_DTM_START: NORMAL

## 2024-10-14 PROCEDURE — 93284 PRGRMG EVAL IMPLANTABLE DFB: CPT | Performed by: INTERNAL MEDICINE

## 2024-10-15 ENCOUNTER — TELEPHONE (OUTPATIENT)
Dept: CARDIOLOGY | Facility: CLINIC | Age: 54
End: 2024-10-15
Payer: COMMERCIAL

## 2024-10-15 DIAGNOSIS — I50.20 HEART FAILURE WITH REDUCED EJECTION FRACTION (H): Chronic | ICD-10-CM

## 2024-10-15 DIAGNOSIS — I50.9 NEW ONSET OF CONGESTIVE HEART FAILURE (H): ICD-10-CM

## 2024-10-15 RX ORDER — BUMETANIDE 0.5 MG/1
0.5 TABLET ORAL DAILY
Qty: 90 TABLET | Refills: 4 | Status: SHIPPED | OUTPATIENT
Start: 2024-10-15

## 2024-10-15 NOTE — TELEPHONE ENCOUNTER
[facility] Call Center    Phone Message    May a detailed message be left on voicemail: yes     Reason for Call: Medication Refill Request    Has the patient contacted the pharmacy for the refill? Yes   Name of medication being requested: bumetanide (BUMEX) 0.5 MG tablet    empagliflozin (JARDIANCE) 10 MG TABS tablet  ENTRESTO 24-26 MG per tablet    Provider who prescribed the medication: Lauren Vázquez MD   Pharmacy:   [facility] [address] [place] [address] N.     Date medication is needed: asap     Action Taken: Other: cardio    Travel Screening: Not Applicable    Thank you!  [facility]       Date of Service:

## 2024-10-16 RX ORDER — SACUBITRIL AND VALSARTAN 24; 26 MG/1; MG/1
1 TABLET, FILM COATED ORAL 2 TIMES DAILY
Qty: 180 TABLET | Refills: 0 | Status: SHIPPED | OUTPATIENT
Start: 2024-10-16

## 2024-10-16 NOTE — TELEPHONE ENCOUNTER
[facility] Call [facility]    Phone Message    May a detailed message be left on voicemail: yes     Reason for Call: Medication Refill Request    Has the patient contacted the pharmacy for the refill? Yes   Name of medication being requested: ENTRESTO 24-26 MG per tablet    Provider who prescribed the medication: clarence johnson  Pharmacy:     [id number] [[address]      Date medication is needed: asap   Patient is calling stating that pharmacy never received this medication, please advise, thank you    Action Taken: Other: cardiology     Travel Screening: Not Applicable     Date of Service:

## 2024-10-21 LAB
MDC_IDC_LEAD_CONNECTION_STATUS: NORMAL
MDC_IDC_LEAD_IMPLANT_DT: NORMAL
MDC_IDC_LEAD_LOCATION: NORMAL
MDC_IDC_LEAD_LOCATION_DETAIL_1: NORMAL
MDC_IDC_LEAD_MFG: NORMAL
MDC_IDC_LEAD_MODEL: NORMAL
MDC_IDC_LEAD_POLARITY_TYPE: NORMAL
MDC_IDC_LEAD_SERIAL: NORMAL
MDC_IDC_LEAD_SPECIAL_FUNCTION: NORMAL
MDC_IDC_MSMT_BATTERY_DTM: NORMAL
MDC_IDC_MSMT_BATTERY_REMAINING_LONGEVITY: 33 MO
MDC_IDC_MSMT_BATTERY_RRT_TRIGGER: NORMAL
MDC_IDC_MSMT_BATTERY_VOLTAGE: 2.95 V
MDC_IDC_MSMT_CAP_CHARGE_DTM: NORMAL
MDC_IDC_MSMT_CAP_CHARGE_ENERGY: 18 J
MDC_IDC_MSMT_CAP_CHARGE_TIME: 3.8 S
MDC_IDC_MSMT_CAP_CHARGE_TYPE: NORMAL
MDC_IDC_MSMT_LEADCHNL_LV_IMPEDANCE_VALUE: 361 OHM
MDC_IDC_MSMT_LEADCHNL_LV_IMPEDANCE_VALUE: 456 OHM
MDC_IDC_MSMT_LEADCHNL_LV_IMPEDANCE_VALUE: 494 OHM
MDC_IDC_MSMT_LEADCHNL_LV_IMPEDANCE_VALUE: 646 OHM
MDC_IDC_MSMT_LEADCHNL_LV_IMPEDANCE_VALUE: 684 OHM
MDC_IDC_MSMT_LEADCHNL_LV_IMPEDANCE_VALUE: 779 OHM
MDC_IDC_MSMT_LEADCHNL_LV_IMPEDANCE_VALUE: 817 OHM
MDC_IDC_MSMT_LEADCHNL_LV_IMPEDANCE_VALUE: 817 OHM
MDC_IDC_MSMT_LEADCHNL_LV_PACING_THRESHOLD_AMPLITUDE: 3 V
MDC_IDC_MSMT_LEADCHNL_LV_PACING_THRESHOLD_PULSEWIDTH: 1 MS
MDC_IDC_MSMT_LEADCHNL_RA_IMPEDANCE_VALUE: 380 OHM
MDC_IDC_MSMT_LEADCHNL_RA_PACING_THRESHOLD_AMPLITUDE: 0.62 V
MDC_IDC_MSMT_LEADCHNL_RA_PACING_THRESHOLD_PULSEWIDTH: 0.4 MS
MDC_IDC_MSMT_LEADCHNL_RA_SENSING_INTR_AMPL: 4.4 MV
MDC_IDC_MSMT_LEADCHNL_RV_IMPEDANCE_VALUE: 323 OHM
MDC_IDC_MSMT_LEADCHNL_RV_IMPEDANCE_VALUE: 456 OHM
MDC_IDC_MSMT_LEADCHNL_RV_PACING_THRESHOLD_AMPLITUDE: 0.75 V
MDC_IDC_MSMT_LEADCHNL_RV_PACING_THRESHOLD_PULSEWIDTH: 0.4 MS
MDC_IDC_MSMT_LEADCHNL_RV_SENSING_INTR_AMPL: 9.3 MV
MDC_IDC_PG_IMPLANT_DTM: NORMAL
MDC_IDC_PG_MFG: NORMAL
MDC_IDC_PG_MODEL: NORMAL
MDC_IDC_PG_SERIAL: NORMAL
MDC_IDC_PG_TYPE: NORMAL
MDC_IDC_SESS_CLINIC_NAME: NORMAL
MDC_IDC_SESS_DTM: NORMAL
MDC_IDC_SESS_TYPE: NORMAL
MDC_IDC_SET_BRADY_AT_MODE_SWITCH_RATE: 171 {BEATS}/MIN
MDC_IDC_SET_BRADY_LOWRATE: 50 {BEATS}/MIN
MDC_IDC_SET_BRADY_MAX_SENSOR_RATE: 130 {BEATS}/MIN
MDC_IDC_SET_BRADY_MAX_TRACKING_RATE: 130 {BEATS}/MIN
MDC_IDC_SET_BRADY_MODE: NORMAL
MDC_IDC_SET_BRADY_PAV_DELAY_LOW: 140 MS
MDC_IDC_SET_BRADY_SAV_DELAY_LOW: 100 MS
MDC_IDC_SET_CRT_LVRV_DELAY: 0 MS
MDC_IDC_SET_CRT_PACED_CHAMBERS: NORMAL
MDC_IDC_SET_LEADCHNL_LV_PACING_AMPLITUDE: 4.25 V
MDC_IDC_SET_LEADCHNL_LV_PACING_ANODE_ELECTRODE_1: NORMAL
MDC_IDC_SET_LEADCHNL_LV_PACING_ANODE_LOCATION_1: NORMAL
MDC_IDC_SET_LEADCHNL_LV_PACING_CAPTURE_MODE: NORMAL
MDC_IDC_SET_LEADCHNL_LV_PACING_CATHODE_ELECTRODE_1: NORMAL
MDC_IDC_SET_LEADCHNL_LV_PACING_CATHODE_LOCATION_1: NORMAL
MDC_IDC_SET_LEADCHNL_LV_PACING_POLARITY: NORMAL
MDC_IDC_SET_LEADCHNL_LV_PACING_PULSEWIDTH: 1 MS
MDC_IDC_SET_LEADCHNL_RA_PACING_AMPLITUDE: 1.5 V
MDC_IDC_SET_LEADCHNL_RA_PACING_ANODE_ELECTRODE_1: NORMAL
MDC_IDC_SET_LEADCHNL_RA_PACING_ANODE_LOCATION_1: NORMAL
MDC_IDC_SET_LEADCHNL_RA_PACING_CAPTURE_MODE: NORMAL
MDC_IDC_SET_LEADCHNL_RA_PACING_CATHODE_ELECTRODE_1: NORMAL
MDC_IDC_SET_LEADCHNL_RA_PACING_CATHODE_LOCATION_1: NORMAL
MDC_IDC_SET_LEADCHNL_RA_PACING_POLARITY: NORMAL
MDC_IDC_SET_LEADCHNL_RA_PACING_PULSEWIDTH: 0.4 MS
MDC_IDC_SET_LEADCHNL_RA_SENSING_ANODE_ELECTRODE_1: NORMAL
MDC_IDC_SET_LEADCHNL_RA_SENSING_ANODE_LOCATION_1: NORMAL
MDC_IDC_SET_LEADCHNL_RA_SENSING_CATHODE_ELECTRODE_1: NORMAL
MDC_IDC_SET_LEADCHNL_RA_SENSING_CATHODE_LOCATION_1: NORMAL
MDC_IDC_SET_LEADCHNL_RA_SENSING_POLARITY: NORMAL
MDC_IDC_SET_LEADCHNL_RA_SENSING_SENSITIVITY: 0.3 MV
MDC_IDC_SET_LEADCHNL_RV_PACING_AMPLITUDE: 2 V
MDC_IDC_SET_LEADCHNL_RV_PACING_ANODE_ELECTRODE_1: NORMAL
MDC_IDC_SET_LEADCHNL_RV_PACING_ANODE_LOCATION_1: NORMAL
MDC_IDC_SET_LEADCHNL_RV_PACING_CAPTURE_MODE: NORMAL
MDC_IDC_SET_LEADCHNL_RV_PACING_CATHODE_ELECTRODE_1: NORMAL
MDC_IDC_SET_LEADCHNL_RV_PACING_CATHODE_LOCATION_1: NORMAL
MDC_IDC_SET_LEADCHNL_RV_PACING_POLARITY: NORMAL
MDC_IDC_SET_LEADCHNL_RV_PACING_PULSEWIDTH: 0.4 MS
MDC_IDC_SET_LEADCHNL_RV_SENSING_ANODE_ELECTRODE_1: NORMAL
MDC_IDC_SET_LEADCHNL_RV_SENSING_ANODE_LOCATION_1: NORMAL
MDC_IDC_SET_LEADCHNL_RV_SENSING_CATHODE_ELECTRODE_1: NORMAL
MDC_IDC_SET_LEADCHNL_RV_SENSING_CATHODE_LOCATION_1: NORMAL
MDC_IDC_SET_LEADCHNL_RV_SENSING_POLARITY: NORMAL
MDC_IDC_SET_LEADCHNL_RV_SENSING_SENSITIVITY: 0.3 MV
MDC_IDC_SET_ZONE_DETECTION_BEATS_DENOMINATOR: 16 {BEATS}
MDC_IDC_SET_ZONE_DETECTION_BEATS_DENOMINATOR: 32 {BEATS}
MDC_IDC_SET_ZONE_DETECTION_BEATS_DENOMINATOR: 40 {BEATS}
MDC_IDC_SET_ZONE_DETECTION_BEATS_NUMERATOR: 16 {BEATS}
MDC_IDC_SET_ZONE_DETECTION_BEATS_NUMERATOR: 30 {BEATS}
MDC_IDC_SET_ZONE_DETECTION_BEATS_NUMERATOR: 32 {BEATS}
MDC_IDC_SET_ZONE_DETECTION_INTERVAL: 270 MS
MDC_IDC_SET_ZONE_DETECTION_INTERVAL: 320 MS
MDC_IDC_SET_ZONE_DETECTION_INTERVAL: 350 MS
MDC_IDC_SET_ZONE_DETECTION_INTERVAL: 360 MS
MDC_IDC_SET_ZONE_DETECTION_INTERVAL: 400 MS
MDC_IDC_SET_ZONE_STATUS: NORMAL
MDC_IDC_SET_ZONE_TYPE: NORMAL
MDC_IDC_SET_ZONE_VENDOR_TYPE: NORMAL
MDC_IDC_STAT_AT_BURDEN_PERCENT: 0 %
MDC_IDC_STAT_AT_DTM_END: NORMAL
MDC_IDC_STAT_AT_DTM_START: NORMAL
MDC_IDC_STAT_BRADY_DTM_END: NORMAL
MDC_IDC_STAT_BRADY_DTM_START: NORMAL
MDC_IDC_STAT_BRADY_RA_PERCENT_PACED: 0.5 %
MDC_IDC_STAT_BRADY_RV_PERCENT_PACED: 4.15 %
MDC_IDC_STAT_CRT_DTM_END: NORMAL
MDC_IDC_STAT_CRT_DTM_START: NORMAL
MDC_IDC_STAT_CRT_LV_PERCENT_PACED: 94.81 %
MDC_IDC_STAT_CRT_PERCENT_PACED: 4.12 %
MDC_IDC_STAT_EPISODE_RECENT_COUNT: 0
MDC_IDC_STAT_EPISODE_RECENT_COUNT_DTM_END: NORMAL
MDC_IDC_STAT_EPISODE_RECENT_COUNT_DTM_START: NORMAL
MDC_IDC_STAT_EPISODE_TOTAL_COUNT: 0
MDC_IDC_STAT_EPISODE_TOTAL_COUNT: 1
MDC_IDC_STAT_EPISODE_TOTAL_COUNT_DTM_END: NORMAL
MDC_IDC_STAT_EPISODE_TOTAL_COUNT_DTM_START: NORMAL
MDC_IDC_STAT_EPISODE_TYPE: NORMAL
MDC_IDC_STAT_TACHYTHERAPY_ATP_DELIVERED_RECENT: 0
MDC_IDC_STAT_TACHYTHERAPY_ATP_DELIVERED_TOTAL: 0
MDC_IDC_STAT_TACHYTHERAPY_RECENT_DTM_END: NORMAL
MDC_IDC_STAT_TACHYTHERAPY_RECENT_DTM_START: NORMAL
MDC_IDC_STAT_TACHYTHERAPY_SHOCKS_ABORTED_RECENT: 0
MDC_IDC_STAT_TACHYTHERAPY_SHOCKS_ABORTED_TOTAL: 0
MDC_IDC_STAT_TACHYTHERAPY_SHOCKS_DELIVERED_RECENT: 0
MDC_IDC_STAT_TACHYTHERAPY_SHOCKS_DELIVERED_TOTAL: 0
MDC_IDC_STAT_TACHYTHERAPY_TOTAL_DTM_END: NORMAL
MDC_IDC_STAT_TACHYTHERAPY_TOTAL_DTM_START: NORMAL

## 2024-10-21 PROCEDURE — 93295 DEV INTERROG REMOTE 1/2/MLT: CPT | Performed by: INTERNAL MEDICINE

## 2024-10-21 PROCEDURE — 93296 REM INTERROG EVL PM/IDS: CPT | Performed by: INTERNAL MEDICINE

## 2024-10-28 ENCOUNTER — TELEPHONE (OUTPATIENT)
Dept: CARDIOLOGY | Facility: CLINIC | Age: 54
End: 2024-10-28
Payer: COMMERCIAL

## 2024-10-28 NOTE — TELEPHONE ENCOUNTER
[facility] Call [facility]    Phone Message    May a detailed message be left on voicemail: yes     Reason for Call: Other: Pt called to speak with Adriane to see if any other documents were needed for the barostem procedure appeal.  Please call to clarify.   She noted there was 10 days to provide add'l paperwork as needed.    Action Taken: Other: cardio    Travel Screening: Not Applicable     Date of Service:

## 2024-10-28 NOTE — TELEPHONE ENCOUNTER
Patient is contacted to let her know that we will need to wait up to 45 days for any pending decision with the Barostim. Adriane MINAYA RN BSN, CHFN, PCCN-K

## 2024-12-02 ENCOUNTER — OFFICE VISIT (OUTPATIENT)
Dept: CARDIOLOGY | Facility: CLINIC | Age: 54
End: 2024-12-02
Payer: COMMERCIAL

## 2024-12-02 VITALS
WEIGHT: 207 LBS | HEART RATE: 70 BPM | RESPIRATION RATE: 16 BRPM | DIASTOLIC BLOOD PRESSURE: 62 MMHG | BODY MASS INDEX: 35.34 KG/M2 | SYSTOLIC BLOOD PRESSURE: 92 MMHG | HEIGHT: 64 IN

## 2024-12-02 DIAGNOSIS — I50.20 HEART FAILURE WITH REDUCED EJECTION FRACTION (H): Primary | Chronic | ICD-10-CM

## 2024-12-02 PROCEDURE — G2211 COMPLEX E/M VISIT ADD ON: HCPCS | Performed by: INTERNAL MEDICINE

## 2024-12-02 PROCEDURE — 99214 OFFICE O/P EST MOD 30 MIN: CPT | Performed by: INTERNAL MEDICINE

## 2024-12-02 NOTE — LETTER
12/2/2024    Reymundo Avila MD  [address]  [address] [place] [address]    RE: Eva Azulvenkat       Dear Colleague,     I had the pleasure of seeing Eva Parikh in the [facility].    HEART CARE NOTE          Assessment/Recommendations     1. Severe Biventricular failure c/b severe ADHF  Assessment / Plan  NICM; Near euvolemia on physical exam; denies HF symptoms - no changes at this time  Continue to monitor renal function/repeat BMP, UOP and hemodynamics closely   GDMT as detailed below; s/p CRT-D     Current Pharmacotherapy AHA Guideline-Directed Medical Therapy   Sacubitril- valsartan 24-26 mg BID Lisinopril 20 mg twice daily   Metoprolol succinate 25 mg daily Carvedilol 25 mg twice daily   Spironolactone not started Spironolactone 25 mg once daily   Hydralazine NA Hydralazine 100 mg three times daily   Isosorbide dinitrate NA Isosorbide dinitrate 40 mg three times daily   SGLT2 inhibitor:Empagliflozin - 10 mg daily Dapagliflozin or Empagliflozin 10 mg daily      2. Valvular heart disease  Assessment / Plan  Trace MR, trace TR      3. SAM  Assessment / Plan  CPAP prescribed, but was recalled - patient will follow-up     30 minutes spent reviewing prior records (including documentation, laboratory studies, cardiac testing/imaging), history and physical exam, planning, and subsequent documentation.      The longitudinal plan of care for HFrEF was addressed during this visit. Due to the added complexity in care, I will continue to support Ms. Eva Parikh in the subsequent management of this condition(s) and with the ongoing continuity of care of this condition(s).      History of Present Illness/Subjective    Ms. Eva Parikh is a 52 year old female with a PMHx significant for (per Dr. Espino's note) COPD/asthma overlap syndrome, obstructive sleep apnea not using her CPAP with no previous history of cardiac disease found to be in new onset ADHF.     Today, Mrs.  "Jl denies acute cardiac events or complaints; Management plan as detailed above     ECG: personally reviewed; sinus tachycardia.     ECHO (personally reviewed 12/2/24):   The left ventricle is mildly dilated.  The visual ejection fraction is 20-25%. There is severe global hypokinesia of  the left ventricle.  The right ventricle is normal in size and function.  No signficant valve disease.  Compared to the prior study dated 11/20/2023, there have been no changes.    Lab results: personally reviewed December 2, 2024; notable for stable renal function    Medical history and pertinent documents reviewed in Care Everywhere please where applicable see details above        Physical Examination Review of Systems   BP 92/62 (BP Location: Left arm, Patient Position: Sitting, Cuff Size: Adult Regular)   Pulse 70   Resp 16   Ht 1.626 m (5' 4\")   Wt 93.9 kg (207 lb)   BMI 35.53 kg/m    Body mass index is 35.53 kg/m .  Wt Readings from Last 3 Encounters:   12/02/24 93.9 kg (207 lb)   09/18/24 93.4 kg (206 lb)   06/19/24 96.2 kg (212 lb)     General Appearance:   no distress, normal body habitus   ENT/Mouth: membranes moist, no oral lesions or bleeding gums.      EYES:  no scleral icterus, normal conjunctivae   Neck: no carotid bruits or thyromegaly   Chest/Lungs:   lungs are clear to auscultation, no rales or wheezing, equal chest wall expansion    Cardiovascular:   Regular. Normal first and second heart sounds with no murmurs, rubs, or gallops; the carotid, radial and posterior tibial pulses are intact, no JVD or LE edema bilaterally    Abdomen:  no organomegaly, masses, bruits, or tenderness; bowel sounds are present   Extremities: no cyanosis or clubbing   Skin: no xanthelasma, warm.    Neurologic: NAD     Psychiatric: alert and oriented x3, calm     A complete 10 systems ROS was reviewed  And is negative except what is listed in the HPI.          Medical History  Surgical History Family History Social History "   Past Medical History:   Diagnosis Date     Acute hypoxemic respiratory failure (H) 2023     Anxiety      Arthritis      Asthma      COPD (chronic obstructive pulmonary disease) (H)      Depression      Heart failure with reduced ejection fraction (H)      Hepatic cyst 10/30/2017     New onset of congestive heart failure (H) 2023     Non-ischemic cardiomyopathy (H)      Obese      SAM (obstructive sleep apnea)      Sleep apnea     Past Surgical History:   Procedure Laterality Date     ADENOIDECTOMY       EP ICD INSERT BIVENT N/A 2024    Procedure: Implantable Cardioverter Defibrillator Device & Lead Implant Biventricular;  Surgeon: Tierra Elias MD;  Location: [facility] CV     IMPLANT AUTOMATIC IMPLANTABLE CARDIOVERTER DEFIBRILLATOR       TONSILLECTOMY      no family history of premature coronary artery disease Social History     Socioeconomic History     Marital status: Single     Spouse name: Not on file     Number of children: 1     Years of education: HS     Highest education level: Not on file   Occupational History     Not on file   Tobacco Use     Smoking status: Former     Current packs/day: 0.00     Average packs/day: 1.9 packs/day for 50.0 years (94.0 ttl pk-yrs)     Types: Cigarettes     Start date:      Quit date: 2020     Years since quittin.9     Smokeless tobacco: Never     Tobacco comments:     Decreased from 2 ppd to 1 ppd since May 2014   Vaping Use     Vaping status: Never Used   Substance and Sexual Activity     Alcohol use: No     Drug use: No     Sexual activity: Not Currently   Other Topics Concern     Not on file   Social History Narrative    Lives on her own.     Social Drivers of Health     Financial Resource Strain: Not on file   Food Insecurity: Not on file   Transportation Needs: Not on file   Physical Activity: Not on file   Stress: Not on file   Social Connections: Not on file   Interpersonal Safety: Not on file   Housing Stability: Not  on file           Lab Results    Chemistry/lipid CBC Cardiac Enzymes/BNP/TSH/INR   Lab Results   Component Value Date    CHOL 154 09/08/2023    HDL 28 (L) 09/08/2023    TRIG 166 (H) 09/08/2023    BUN 13.7 02/19/2024     02/19/2024    CO2 27 02/19/2024    Lab Results   Component Value Date    WBC 7.5 02/19/2024    HGB 14.9 02/19/2024    HCT 46.2 02/19/2024    MCV 90 02/19/2024     (L) 02/19/2024    Lab Results   Component Value Date    TROPONINI 0.02 01/01/2020    BNP 16 01/01/2020    TSH 0.45 08/27/2023     Lab Results   Component Value Date    TROPONINI 0.02 01/01/2020          Weight:    Wt Readings from Last 3 Encounters:   09/18/24 93.4 kg (206 lb)   06/19/24 96.2 kg (212 lb)   03/18/24 98.9 kg (218 lb)       Allergies  Allergies   Allergen Reactions     Amoxicillin Unknown     As kid had mold test and told allergic      Mold [Molds & Smuts] Unknown     Mold/Mildew [Molds & Smuts] Unknown     Other Environmental Allergy Unknown     DUST     Penicillins Unknown     As kid had mold test and told allergic      Pollen [Pollen Extract] Unknown         Surgical History  Past Surgical History:   Procedure Laterality Date     ADENOIDECTOMY       EP ICD INSERT BIVENT N/A 02/12/2024    Procedure: Implantable Cardioverter Defibrillator Device & Lead Implant Biventricular;  Surgeon: Tierra Elias MD;  Location: [facility] CV     IMPLANT AUTOMATIC IMPLANTABLE CARDIOVERTER DEFIBRILLATOR       TONSILLECTOMY         Social History  Tobacco:   History   Smoking Status     Former     Types: Cigarettes   Smokeless Tobacco     Never    Alcohol:   Social History    Substance and Sexual Activity      Alcohol use: No   Illicit Drugs:   History   Drug Use No       Family History  Family History   Problem Relation Age of Onset     Diabetes Mother      Thyroid Disease Mother      Heart Disease Mother      Mental Illness Mother      Diabetes Maternal Grandmother      Heart Disease Maternal Grandmother       Bipolar Disorder Sister           Lauren Vázquez MD on 12/2/2024      cc: Reymundo Avila      Thank you for allowing me to participate in the care of your patient.      Sincerely,     Lauren Vázquez MD     [facility]  cc:   Lauren Vázquez MD  [address] [id number]

## 2024-12-02 NOTE — PROGRESS NOTES
HEART CARE NOTE          Assessment/Recommendations     1. Severe Biventricular failure c/b severe ADHF  Assessment / Plan  NICM; Near euvolemia on physical exam; denies HF symptoms - no changes at this time  Continue to monitor renal function/repeat BMP, UOP and hemodynamics closely   GDMT as detailed below; s/p CRT-D     Current Pharmacotherapy AHA Guideline-Directed Medical Therapy   Sacubitril- valsartan 24-26 mg BID Lisinopril 20 mg twice daily   Metoprolol succinate 25 mg daily Carvedilol 25 mg twice daily   Spironolactone not started Spironolactone 25 mg once daily   Hydralazine NA Hydralazine 100 mg three times daily   Isosorbide dinitrate NA Isosorbide dinitrate 40 mg three times daily   SGLT2 inhibitor:Empagliflozin - 10 mg daily Dapagliflozin or Empagliflozin 10 mg daily      2. Valvular heart disease  Assessment / Plan  Trace MR, trace TR      3. SAM  Assessment / Plan  CPAP prescribed, but was recalled - patient will follow-up     30 minutes spent reviewing prior records (including documentation, laboratory studies, cardiac testing/imaging), history and physical exam, planning, and subsequent documentation.      The longitudinal plan of care for HFrEF was addressed during this visit. Due to the added complexity in care, I will continue to support Ms. Eva Parikh in the subsequent management of this condition(s) and with the ongoing continuity of care of this condition(s).      History of Present Illness/Subjective    Ms. Eva Parikh is a 52 year old female with a PMHx significant for (per Dr. Espino's note) COPD/asthma overlap syndrome, obstructive sleep apnea not using her CPAP with no previous history of cardiac disease found to be in new onset ADHF.     Today, Mrs. Parikh denies acute cardiac events or complaints; Management plan as detailed above     ECG: personally reviewed; sinus tachycardia.     ECHO (personally reviewed 12/2/24):   The left ventricle is mildly dilated.  The  "visual ejection fraction is 20-25%. There is severe global hypokinesia of  the left ventricle.  The right ventricle is normal in size and function.  No signficant valve disease.  Compared to the prior study dated 11/20/2023, there have been no changes.    Lab results: personally reviewed December 2, 2024; notable for stable renal function    Medical history and pertinent documents reviewed in Care Everywhere please where applicable see details above        Physical Examination Review of Systems   BP 92/62 (BP Location: Left arm, Patient Position: Sitting, Cuff Size: Adult Regular)   Pulse 70   Resp 16   Ht 1.626 m (5' 4\")   Wt 93.9 kg (207 lb)   BMI 35.53 kg/m    Body mass index is 35.53 kg/m .  Wt Readings from Last 3 Encounters:   12/02/24 93.9 kg (207 lb)   09/18/24 93.4 kg (206 lb)   06/19/24 96.2 kg (212 lb)     General Appearance:   no distress, normal body habitus   ENT/Mouth: membranes moist, no oral lesions or bleeding gums.      EYES:  no scleral icterus, normal conjunctivae   Neck: no carotid bruits or thyromegaly   Chest/Lungs:   lungs are clear to auscultation, no rales or wheezing, equal chest wall expansion    Cardiovascular:   Regular. Normal first and second heart sounds with no murmurs, rubs, or gallops; the carotid, radial and posterior tibial pulses are intact, no JVD or LE edema bilaterally    Abdomen:  no organomegaly, masses, bruits, or tenderness; bowel sounds are present   Extremities: no cyanosis or clubbing   Skin: no xanthelasma, warm.    Neurologic: NAD     Psychiatric: alert and oriented x3, calm     A complete 10 systems ROS was reviewed  And is negative except what is listed in the HPI.          Medical History  Surgical History Family History Social History   Past Medical History:   Diagnosis Date    Acute hypoxemic respiratory failure (H) 08/26/2023    Anxiety     Arthritis     Asthma     COPD (chronic obstructive pulmonary disease) (H)     Depression     Heart failure with " reduced ejection fraction (H)     Hepatic cyst 10/30/2017    New onset of congestive heart failure (H) 2023    Non-ischemic cardiomyopathy (H)     Obese     SAM (obstructive sleep apnea)     Sleep apnea     Past Surgical History:   Procedure Laterality Date    ADENOIDECTOMY      EP ICD INSERT BIVENT N/A 2024    Procedure: Implantable Cardioverter Defibrillator Device & Lead Implant Biventricular;  Surgeon: Tierra Elias MD;  Location: [facility] CV    IMPLANT AUTOMATIC IMPLANTABLE CARDIOVERTER DEFIBRILLATOR      TONSILLECTOMY      no family history of premature coronary artery disease Social History     Socioeconomic History    Marital status: Single     Spouse name: Not on file    Number of children: 1    Years of education: HS    Highest education level: Not on file   Occupational History    Not on file   Tobacco Use    Smoking status: Former     Current packs/day: 0.00     Average packs/day: 1.9 packs/day for 50.0 years (94.0 ttl pk-yrs)     Types: Cigarettes     Start date:      Quit date: 2020     Years since quittin.9    Smokeless tobacco: Never    Tobacco comments:     Decreased from 2 ppd to 1 ppd since May 2014   Vaping Use    Vaping status: Never Used   Substance and Sexual Activity    Alcohol use: No    Drug use: No    Sexual activity: Not Currently   Other Topics Concern    Not on file   Social History Narrative    Lives on her own.     Social Drivers of Health     Financial Resource Strain: Not on file   Food Insecurity: Not on file   Transportation Needs: Not on file   Physical Activity: Not on file   Stress: Not on file   Social Connections: Not on file   Interpersonal Safety: Not on file   Housing Stability: Not on file           Lab Results    Chemistry/lipid CBC Cardiac Enzymes/BNP/TSH/INR   Lab Results   Component Value Date    CHOL 154 2023    HDL 28 (L) 2023    TRIG 166 (H) 2023    BUN 13.7 2024     2024    CO2 27  02/19/2024    Lab Results   Component Value Date    WBC 7.5 02/19/2024    HGB 14.9 02/19/2024    HCT 46.2 02/19/2024    MCV 90 02/19/2024     (L) 02/19/2024    Lab Results   Component Value Date    TROPONINI 0.02 01/01/2020    BNP 16 01/01/2020    TSH 0.45 08/27/2023     Lab Results   Component Value Date    TROPONINI 0.02 01/01/2020          Weight:    Wt Readings from Last 3 Encounters:   09/18/24 93.4 kg (206 lb)   06/19/24 96.2 kg (212 lb)   03/18/24 98.9 kg (218 lb)       Allergies  Allergies   Allergen Reactions    Amoxicillin Unknown     As kid had mold test and told allergic     Mold [Molds & Smuts] Unknown    Mold/Mildew [Molds & Smuts] Unknown    Other Environmental Allergy Unknown     DUST    Penicillins Unknown     As kid had mold test and told allergic     Pollen [Pollen Extract] Unknown         Surgical History  Past Surgical History:   Procedure Laterality Date    ADENOIDECTOMY      EP ICD INSERT BIVENT N/A 02/12/2024    Procedure: Implantable Cardioverter Defibrillator Device & Lead Implant Biventricular;  Surgeon: Tierra Elias MD;  Location: [facility]    IMPLANT AUTOMATIC IMPLANTABLE CARDIOVERTER DEFIBRILLATOR      TONSILLECTOMY         Social History  Tobacco:   History   Smoking Status    Former    Types: Cigarettes   Smokeless Tobacco    Never    Alcohol:   Social History    Substance and Sexual Activity      Alcohol use: No   Illicit Drugs:   History   Drug Use No       Family History  Family History   Problem Relation Age of Onset    Diabetes Mother     Thyroid Disease Mother     Heart Disease Mother     Mental Illness Mother     Diabetes Maternal Grandmother     Heart Disease Maternal Grandmother     Bipolar Disorder Sister           Lauren Vázquez MD on 12/2/2024      cc: Reymundo Avila

## 2025-02-17 ENCOUNTER — TELEPHONE (OUTPATIENT)
Dept: PULMONOLOGY | Facility: CLINIC | Age: 55
End: 2025-02-17
Payer: COMMERCIAL

## 2025-02-17 ENCOUNTER — HOSPITAL ENCOUNTER (OUTPATIENT)
Dept: CT IMAGING | Facility: HOSPITAL | Age: 55
Discharge: HOME | End: 2025-02-17
Attending: NURSE PRACTITIONER | Admitting: NURSE PRACTITIONER
Payer: COMMERCIAL

## 2025-02-17 DIAGNOSIS — Z87.891 PERSONAL HISTORY OF TOBACCO USE: ICD-10-CM

## 2025-02-17 PROCEDURE — 71271 CT THORAX LUNG CANCER SCR C-: CPT

## 2025-02-17 NOTE — TELEPHONE ENCOUNTER
----- Message from Dianna Carrasquillo sent at 2/17/2025 12:10 PM CST -----  Can you call and let her know the CT scan looks good. Nothing new or concerning.     Thanks!  ----- Message -----  From: Addis Parsons  Sent: 2/17/2025   9:46 AM CST  To: JUAN Mccracken CNP

## 2025-03-17 ENCOUNTER — TELEPHONE (OUTPATIENT)
Dept: CARDIOLOGY | Facility: CLINIC | Age: 55
End: 2025-03-17
Payer: COMMERCIAL

## 2025-03-20 NOTE — TELEPHONE ENCOUNTER
Eva is returning my call. She is interested in pursuing the Appeal further for Barostim. Madhavi is sent an email at [employer] to determine next steps. Adriane MINAYA RN BSN, CHFN, PCCN-K

## 2025-04-29 ENCOUNTER — ANCILLARY PROCEDURE (OUTPATIENT)
Dept: CARDIOLOGY | Facility: CLINIC | Age: 55
End: 2025-04-29
Attending: INTERNAL MEDICINE
Payer: COMMERCIAL

## 2025-04-29 DIAGNOSIS — I50.9 CHF (CONGESTIVE HEART FAILURE) (H): ICD-10-CM

## 2025-04-29 DIAGNOSIS — I42.8 NON-ISCHEMIC CARDIOMYOPATHY (H): ICD-10-CM

## 2025-04-29 DIAGNOSIS — Z95.810 ICD (IMPLANTABLE CARDIOVERTER-DEFIBRILLATOR) IN PLACE: ICD-10-CM

## 2025-04-29 LAB
MDC_IDC_LEAD_CONNECTION_STATUS: NORMAL
MDC_IDC_LEAD_IMPLANT_DT: NORMAL
MDC_IDC_LEAD_LOCATION: NORMAL
MDC_IDC_LEAD_LOCATION_DETAIL_1: NORMAL
MDC_IDC_LEAD_MFG: NORMAL
MDC_IDC_LEAD_MODEL: NORMAL
MDC_IDC_LEAD_POLARITY_TYPE: NORMAL
MDC_IDC_LEAD_SERIAL: NORMAL
MDC_IDC_LEAD_SPECIAL_FUNCTION: NORMAL
MDC_IDC_MSMT_BATTERY_DTM: NORMAL
MDC_IDC_MSMT_BATTERY_REMAINING_LONGEVITY: 69 MO
MDC_IDC_MSMT_BATTERY_RRT_TRIGGER: NORMAL
MDC_IDC_MSMT_BATTERY_VOLTAGE: 2.97 V
MDC_IDC_MSMT_CAP_CHARGE_DTM: NORMAL
MDC_IDC_MSMT_CAP_CHARGE_ENERGY: 18 J
MDC_IDC_MSMT_CAP_CHARGE_TIME: 3.8 S
MDC_IDC_MSMT_CAP_CHARGE_TYPE: NORMAL
MDC_IDC_MSMT_LEADCHNL_LV_IMPEDANCE_VALUE: 380 OHM
MDC_IDC_MSMT_LEADCHNL_LV_IMPEDANCE_VALUE: 399 OHM
MDC_IDC_MSMT_LEADCHNL_LV_IMPEDANCE_VALUE: 437 OHM
MDC_IDC_MSMT_LEADCHNL_LV_IMPEDANCE_VALUE: 494 OHM
MDC_IDC_MSMT_LEADCHNL_LV_IMPEDANCE_VALUE: 551 OHM
MDC_IDC_MSMT_LEADCHNL_LV_IMPEDANCE_VALUE: 703 OHM
MDC_IDC_MSMT_LEADCHNL_LV_IMPEDANCE_VALUE: 741 OHM
MDC_IDC_MSMT_LEADCHNL_LV_IMPEDANCE_VALUE: 874 OHM
MDC_IDC_MSMT_LEADCHNL_LV_IMPEDANCE_VALUE: 893 OHM
MDC_IDC_MSMT_LEADCHNL_LV_IMPEDANCE_VALUE: 912 OHM
MDC_IDC_MSMT_LEADCHNL_LV_PACING_THRESHOLD_AMPLITUDE: 2.25 V
MDC_IDC_MSMT_LEADCHNL_LV_PACING_THRESHOLD_PULSEWIDTH: 1 MS
MDC_IDC_MSMT_LEADCHNL_RA_IMPEDANCE_VALUE: 361 OHM
MDC_IDC_MSMT_LEADCHNL_RA_PACING_THRESHOLD_AMPLITUDE: 0.62 V
MDC_IDC_MSMT_LEADCHNL_RA_PACING_THRESHOLD_PULSEWIDTH: 0.4 MS
MDC_IDC_MSMT_LEADCHNL_RA_SENSING_INTR_AMPL: 4.4 MV
MDC_IDC_MSMT_LEADCHNL_RV_IMPEDANCE_VALUE: 342 OHM
MDC_IDC_MSMT_LEADCHNL_RV_IMPEDANCE_VALUE: 437 OHM
MDC_IDC_MSMT_LEADCHNL_RV_PACING_THRESHOLD_AMPLITUDE: 0.62 V
MDC_IDC_MSMT_LEADCHNL_RV_PACING_THRESHOLD_PULSEWIDTH: 0.4 MS
MDC_IDC_MSMT_LEADCHNL_RV_SENSING_INTR_AMPL: 8.4 MV
MDC_IDC_PG_IMPLANT_DTM: NORMAL
MDC_IDC_PG_MFG: NORMAL
MDC_IDC_PG_MODEL: NORMAL
MDC_IDC_PG_SERIAL: NORMAL
MDC_IDC_PG_TYPE: NORMAL
MDC_IDC_SESS_CLINIC_NAME: NORMAL
MDC_IDC_SESS_DTM: NORMAL
MDC_IDC_SESS_TYPE: NORMAL
MDC_IDC_SET_BRADY_AT_MODE_SWITCH_RATE: 171 {BEATS}/MIN
MDC_IDC_SET_BRADY_LOWRATE: 50 {BEATS}/MIN
MDC_IDC_SET_BRADY_MAX_SENSOR_RATE: 130 {BEATS}/MIN
MDC_IDC_SET_BRADY_MAX_TRACKING_RATE: 130 {BEATS}/MIN
MDC_IDC_SET_BRADY_MODE: NORMAL
MDC_IDC_SET_BRADY_PAV_DELAY_LOW: 150 MS
MDC_IDC_SET_BRADY_SAV_DELAY_LOW: 120 MS
MDC_IDC_SET_CRT_LVRV_DELAY: 0 MS
MDC_IDC_SET_CRT_PACED_CHAMBERS: NORMAL
MDC_IDC_SET_LEADCHNL_LV_PACING_AMPLITUDE: 2.75 V
MDC_IDC_SET_LEADCHNL_LV_PACING_ANODE_ELECTRODE_1: NORMAL
MDC_IDC_SET_LEADCHNL_LV_PACING_ANODE_LOCATION_1: NORMAL
MDC_IDC_SET_LEADCHNL_LV_PACING_CAPTURE_MODE: NORMAL
MDC_IDC_SET_LEADCHNL_LV_PACING_CATHODE_ELECTRODE_1: NORMAL
MDC_IDC_SET_LEADCHNL_LV_PACING_CATHODE_LOCATION_1: NORMAL
MDC_IDC_SET_LEADCHNL_LV_PACING_POLARITY: NORMAL
MDC_IDC_SET_LEADCHNL_LV_PACING_PULSEWIDTH: 1 MS
MDC_IDC_SET_LEADCHNL_RA_PACING_AMPLITUDE: 1.5 V
MDC_IDC_SET_LEADCHNL_RA_PACING_ANODE_ELECTRODE_1: NORMAL
MDC_IDC_SET_LEADCHNL_RA_PACING_ANODE_LOCATION_1: NORMAL
MDC_IDC_SET_LEADCHNL_RA_PACING_CAPTURE_MODE: NORMAL
MDC_IDC_SET_LEADCHNL_RA_PACING_CATHODE_ELECTRODE_1: NORMAL
MDC_IDC_SET_LEADCHNL_RA_PACING_CATHODE_LOCATION_1: NORMAL
MDC_IDC_SET_LEADCHNL_RA_PACING_POLARITY: NORMAL
MDC_IDC_SET_LEADCHNL_RA_PACING_PULSEWIDTH: 0.4 MS
MDC_IDC_SET_LEADCHNL_RA_SENSING_ANODE_ELECTRODE_1: NORMAL
MDC_IDC_SET_LEADCHNL_RA_SENSING_ANODE_LOCATION_1: NORMAL
MDC_IDC_SET_LEADCHNL_RA_SENSING_CATHODE_ELECTRODE_1: NORMAL
MDC_IDC_SET_LEADCHNL_RA_SENSING_CATHODE_LOCATION_1: NORMAL
MDC_IDC_SET_LEADCHNL_RA_SENSING_POLARITY: NORMAL
MDC_IDC_SET_LEADCHNL_RA_SENSING_SENSITIVITY: 0.3 MV
MDC_IDC_SET_LEADCHNL_RV_PACING_AMPLITUDE: 2 V
MDC_IDC_SET_LEADCHNL_RV_PACING_ANODE_ELECTRODE_1: NORMAL
MDC_IDC_SET_LEADCHNL_RV_PACING_ANODE_LOCATION_1: NORMAL
MDC_IDC_SET_LEADCHNL_RV_PACING_CAPTURE_MODE: NORMAL
MDC_IDC_SET_LEADCHNL_RV_PACING_CATHODE_ELECTRODE_1: NORMAL
MDC_IDC_SET_LEADCHNL_RV_PACING_CATHODE_LOCATION_1: NORMAL
MDC_IDC_SET_LEADCHNL_RV_PACING_POLARITY: NORMAL
MDC_IDC_SET_LEADCHNL_RV_PACING_PULSEWIDTH: 0.4 MS
MDC_IDC_SET_LEADCHNL_RV_SENSING_ANODE_ELECTRODE_1: NORMAL
MDC_IDC_SET_LEADCHNL_RV_SENSING_ANODE_LOCATION_1: NORMAL
MDC_IDC_SET_LEADCHNL_RV_SENSING_CATHODE_ELECTRODE_1: NORMAL
MDC_IDC_SET_LEADCHNL_RV_SENSING_CATHODE_LOCATION_1: NORMAL
MDC_IDC_SET_LEADCHNL_RV_SENSING_POLARITY: NORMAL
MDC_IDC_SET_LEADCHNL_RV_SENSING_SENSITIVITY: 0.3 MV
MDC_IDC_SET_ZONE_DETECTION_BEATS_DENOMINATOR: 16 {BEATS}
MDC_IDC_SET_ZONE_DETECTION_BEATS_DENOMINATOR: 32 {BEATS}
MDC_IDC_SET_ZONE_DETECTION_BEATS_DENOMINATOR: 40 {BEATS}
MDC_IDC_SET_ZONE_DETECTION_BEATS_NUMERATOR: 16 {BEATS}
MDC_IDC_SET_ZONE_DETECTION_BEATS_NUMERATOR: 30 {BEATS}
MDC_IDC_SET_ZONE_DETECTION_BEATS_NUMERATOR: 32 {BEATS}
MDC_IDC_SET_ZONE_DETECTION_INTERVAL: 270 MS
MDC_IDC_SET_ZONE_DETECTION_INTERVAL: 320 MS
MDC_IDC_SET_ZONE_DETECTION_INTERVAL: 350 MS
MDC_IDC_SET_ZONE_DETECTION_INTERVAL: 360 MS
MDC_IDC_SET_ZONE_DETECTION_INTERVAL: 400 MS
MDC_IDC_SET_ZONE_STATUS: NORMAL
MDC_IDC_SET_ZONE_TYPE: NORMAL
MDC_IDC_SET_ZONE_VENDOR_TYPE: NORMAL
MDC_IDC_STAT_AT_BURDEN_PERCENT: 0 %
MDC_IDC_STAT_AT_DTM_END: NORMAL
MDC_IDC_STAT_AT_DTM_START: NORMAL
MDC_IDC_STAT_BRADY_DTM_END: NORMAL
MDC_IDC_STAT_BRADY_DTM_START: NORMAL
MDC_IDC_STAT_BRADY_RA_PERCENT_PACED: 0.8 %
MDC_IDC_STAT_BRADY_RV_PERCENT_PACED: 3.16 %
MDC_IDC_STAT_CRT_DTM_END: NORMAL
MDC_IDC_STAT_CRT_DTM_START: NORMAL
MDC_IDC_STAT_CRT_LV_PERCENT_PACED: 96.74 %
MDC_IDC_STAT_CRT_PERCENT_PACED: 3.13 %
MDC_IDC_STAT_EPISODE_RECENT_COUNT: 0
MDC_IDC_STAT_EPISODE_RECENT_COUNT_DTM_END: NORMAL
MDC_IDC_STAT_EPISODE_RECENT_COUNT_DTM_START: NORMAL
MDC_IDC_STAT_EPISODE_TOTAL_COUNT: 0
MDC_IDC_STAT_EPISODE_TOTAL_COUNT: 2
MDC_IDC_STAT_EPISODE_TOTAL_COUNT_DTM_END: NORMAL
MDC_IDC_STAT_EPISODE_TOTAL_COUNT_DTM_START: NORMAL
MDC_IDC_STAT_EPISODE_TYPE: NORMAL
MDC_IDC_STAT_TACHYTHERAPY_ATP_DELIVERED_RECENT: 0
MDC_IDC_STAT_TACHYTHERAPY_ATP_DELIVERED_TOTAL: 0
MDC_IDC_STAT_TACHYTHERAPY_RECENT_DTM_END: NORMAL
MDC_IDC_STAT_TACHYTHERAPY_RECENT_DTM_START: NORMAL
MDC_IDC_STAT_TACHYTHERAPY_SHOCKS_ABORTED_RECENT: 0
MDC_IDC_STAT_TACHYTHERAPY_SHOCKS_ABORTED_TOTAL: 0
MDC_IDC_STAT_TACHYTHERAPY_SHOCKS_DELIVERED_RECENT: 0
MDC_IDC_STAT_TACHYTHERAPY_SHOCKS_DELIVERED_TOTAL: 0
MDC_IDC_STAT_TACHYTHERAPY_TOTAL_DTM_END: NORMAL
MDC_IDC_STAT_TACHYTHERAPY_TOTAL_DTM_START: NORMAL

## 2025-05-12 PROCEDURE — 93296 REM INTERROG EVL PM/IDS: CPT | Performed by: INTERNAL MEDICINE

## 2025-05-12 PROCEDURE — 93295 DEV INTERROG REMOTE 1/2/MLT: CPT | Performed by: INTERNAL MEDICINE

## 2025-05-15 ENCOUNTER — TELEPHONE (OUTPATIENT)
Dept: CARDIOLOGY | Facility: CLINIC | Age: 55
End: 2025-05-15
Payer: COMMERCIAL

## 2025-05-15 NOTE — TELEPHONE ENCOUNTER
[facility] Call Center    Phone Message    May a detailed message be left on voicemail: yes     Reason for Call: Order(s): Other:   Reason for requested: In-clinic device check  Date needed: 5/16  Provider name: Dr Vázquez  Please place order and call pt back to schedule    Action Taken: Other: Cardiology    Travel Screening: Not Applicable    Thank you!  [facility]       Date of Service:

## 2025-05-22 NOTE — TELEPHONE ENCOUNTER
From: Loan Foster  Sent: 5/20/2025  10:35 AM CDT  To: [facility]    Hello!   I LVMx3 for patient to call back to scheduled device/ [facility]SB.   Looks like we tried on the 14th, 16th and then today.   Loan     Called and left the patient a voicemail, we relayed the device scheduling phone number.    Toni Kamara RN

## 2025-08-04 ENCOUNTER — ANCILLARY PROCEDURE (OUTPATIENT)
Dept: CARDIOLOGY | Facility: CLINIC | Age: 55
End: 2025-08-04
Attending: INTERNAL MEDICINE
Payer: COMMERCIAL

## 2025-08-04 DIAGNOSIS — Z95.810 ICD (IMPLANTABLE CARDIOVERTER-DEFIBRILLATOR) IN PLACE: ICD-10-CM

## 2025-08-04 LAB
MDC_IDC_EPISODE_DTM: NORMAL
MDC_IDC_EPISODE_DURATION: 1 S
MDC_IDC_EPISODE_DURATION: 1 S
MDC_IDC_EPISODE_DURATION: 2 S
MDC_IDC_EPISODE_DURATION: 2 S
MDC_IDC_EPISODE_DURATION: 3 S
MDC_IDC_EPISODE_ID: 3
MDC_IDC_EPISODE_ID: 4
MDC_IDC_EPISODE_ID: 5
MDC_IDC_EPISODE_ID: 6
MDC_IDC_EPISODE_ID: 7
MDC_IDC_EPISODE_TYPE: NORMAL
MDC_IDC_LEAD_CONNECTION_STATUS: NORMAL
MDC_IDC_LEAD_IMPLANT_DT: NORMAL
MDC_IDC_LEAD_LOCATION: NORMAL
MDC_IDC_LEAD_LOCATION_DETAIL_1: NORMAL
MDC_IDC_LEAD_MFG: NORMAL
MDC_IDC_LEAD_MODEL: NORMAL
MDC_IDC_LEAD_POLARITY_TYPE: NORMAL
MDC_IDC_LEAD_SERIAL: NORMAL
MDC_IDC_LEAD_SPECIAL_FUNCTION: NORMAL
MDC_IDC_MSMT_BATTERY_DTM: NORMAL
MDC_IDC_MSMT_BATTERY_REMAINING_LONGEVITY: 58 MO
MDC_IDC_MSMT_BATTERY_RRT_TRIGGER: NORMAL
MDC_IDC_MSMT_BATTERY_VOLTAGE: 2.97 V
MDC_IDC_MSMT_CAP_CHARGE_DTM: NORMAL
MDC_IDC_MSMT_CAP_CHARGE_ENERGY: 18 J
MDC_IDC_MSMT_CAP_CHARGE_TIME: 3.8 S
MDC_IDC_MSMT_CAP_CHARGE_TYPE: NORMAL
MDC_IDC_MSMT_LEADCHNL_LV_IMPEDANCE_VALUE: 380 OHM
MDC_IDC_MSMT_LEADCHNL_LV_IMPEDANCE_VALUE: 399 OHM
MDC_IDC_MSMT_LEADCHNL_LV_IMPEDANCE_VALUE: 437 OHM
MDC_IDC_MSMT_LEADCHNL_LV_IMPEDANCE_VALUE: 513 OHM
MDC_IDC_MSMT_LEADCHNL_LV_IMPEDANCE_VALUE: 570 OHM
MDC_IDC_MSMT_LEADCHNL_LV_IMPEDANCE_VALUE: 703 OHM
MDC_IDC_MSMT_LEADCHNL_LV_IMPEDANCE_VALUE: 722 OHM
MDC_IDC_MSMT_LEADCHNL_LV_IMPEDANCE_VALUE: 874 OHM
MDC_IDC_MSMT_LEADCHNL_LV_IMPEDANCE_VALUE: 893 OHM
MDC_IDC_MSMT_LEADCHNL_LV_IMPEDANCE_VALUE: 912 OHM
MDC_IDC_MSMT_LEADCHNL_LV_PACING_THRESHOLD_AMPLITUDE: 1.88 V
MDC_IDC_MSMT_LEADCHNL_LV_PACING_THRESHOLD_AMPLITUDE: 2.25 V
MDC_IDC_MSMT_LEADCHNL_LV_PACING_THRESHOLD_PULSEWIDTH: 1 MS
MDC_IDC_MSMT_LEADCHNL_LV_PACING_THRESHOLD_PULSEWIDTH: 1 MS
MDC_IDC_MSMT_LEADCHNL_RA_IMPEDANCE_VALUE: 380 OHM
MDC_IDC_MSMT_LEADCHNL_RA_PACING_THRESHOLD_AMPLITUDE: 0.5 V
MDC_IDC_MSMT_LEADCHNL_RA_PACING_THRESHOLD_AMPLITUDE: 0.62 V
MDC_IDC_MSMT_LEADCHNL_RA_PACING_THRESHOLD_PULSEWIDTH: 0.4 MS
MDC_IDC_MSMT_LEADCHNL_RA_PACING_THRESHOLD_PULSEWIDTH: 0.4 MS
MDC_IDC_MSMT_LEADCHNL_RA_SENSING_INTR_AMPL: 5 MV
MDC_IDC_MSMT_LEADCHNL_RV_IMPEDANCE_VALUE: 361 OHM
MDC_IDC_MSMT_LEADCHNL_RV_IMPEDANCE_VALUE: 456 OHM
MDC_IDC_MSMT_LEADCHNL_RV_PACING_THRESHOLD_AMPLITUDE: 0.62 V
MDC_IDC_MSMT_LEADCHNL_RV_PACING_THRESHOLD_AMPLITUDE: 0.75 V
MDC_IDC_MSMT_LEADCHNL_RV_PACING_THRESHOLD_PULSEWIDTH: 0.4 MS
MDC_IDC_MSMT_LEADCHNL_RV_PACING_THRESHOLD_PULSEWIDTH: 0.4 MS
MDC_IDC_MSMT_LEADCHNL_RV_SENSING_INTR_AMPL: 11.8 MV
MDC_IDC_PG_IMPLANT_DTM: NORMAL
MDC_IDC_PG_MFG: NORMAL
MDC_IDC_PG_MODEL: NORMAL
MDC_IDC_PG_SERIAL: NORMAL
MDC_IDC_PG_TYPE: NORMAL
MDC_IDC_SESS_CLINIC_NAME: NORMAL
MDC_IDC_SESS_DTM: NORMAL
MDC_IDC_SESS_TYPE: NORMAL
MDC_IDC_SET_BRADY_AT_MODE_SWITCH_RATE: 171 {BEATS}/MIN
MDC_IDC_SET_BRADY_LOWRATE: 50 {BEATS}/MIN
MDC_IDC_SET_BRADY_MAX_SENSOR_RATE: 130 {BEATS}/MIN
MDC_IDC_SET_BRADY_MAX_TRACKING_RATE: 130 {BEATS}/MIN
MDC_IDC_SET_BRADY_MODE: NORMAL
MDC_IDC_SET_BRADY_PAV_DELAY_LOW: 140 MS
MDC_IDC_SET_BRADY_SAV_DELAY_LOW: 120 MS
MDC_IDC_SET_CRT_LVRV_DELAY: 0 MS
MDC_IDC_SET_CRT_PACED_CHAMBERS: NORMAL
MDC_IDC_SET_LEADCHNL_LV_PACING_AMPLITUDE: 3.25 V
MDC_IDC_SET_LEADCHNL_LV_PACING_ANODE_ELECTRODE_1: NORMAL
MDC_IDC_SET_LEADCHNL_LV_PACING_ANODE_LOCATION_1: NORMAL
MDC_IDC_SET_LEADCHNL_LV_PACING_CAPTURE_MODE: NORMAL
MDC_IDC_SET_LEADCHNL_LV_PACING_CATHODE_ELECTRODE_1: NORMAL
MDC_IDC_SET_LEADCHNL_LV_PACING_CATHODE_LOCATION_1: NORMAL
MDC_IDC_SET_LEADCHNL_LV_PACING_POLARITY: NORMAL
MDC_IDC_SET_LEADCHNL_LV_PACING_PULSEWIDTH: 1 MS
MDC_IDC_SET_LEADCHNL_RA_PACING_AMPLITUDE: 1.5 V
MDC_IDC_SET_LEADCHNL_RA_PACING_ANODE_ELECTRODE_1: NORMAL
MDC_IDC_SET_LEADCHNL_RA_PACING_ANODE_LOCATION_1: NORMAL
MDC_IDC_SET_LEADCHNL_RA_PACING_CAPTURE_MODE: NORMAL
MDC_IDC_SET_LEADCHNL_RA_PACING_CATHODE_ELECTRODE_1: NORMAL
MDC_IDC_SET_LEADCHNL_RA_PACING_CATHODE_LOCATION_1: NORMAL
MDC_IDC_SET_LEADCHNL_RA_PACING_POLARITY: NORMAL
MDC_IDC_SET_LEADCHNL_RA_PACING_PULSEWIDTH: 0.4 MS
MDC_IDC_SET_LEADCHNL_RA_SENSING_ANODE_ELECTRODE_1: NORMAL
MDC_IDC_SET_LEADCHNL_RA_SENSING_ANODE_LOCATION_1: NORMAL
MDC_IDC_SET_LEADCHNL_RA_SENSING_CATHODE_ELECTRODE_1: NORMAL
MDC_IDC_SET_LEADCHNL_RA_SENSING_CATHODE_LOCATION_1: NORMAL
MDC_IDC_SET_LEADCHNL_RA_SENSING_POLARITY: NORMAL
MDC_IDC_SET_LEADCHNL_RA_SENSING_SENSITIVITY: 0.3 MV
MDC_IDC_SET_LEADCHNL_RV_PACING_AMPLITUDE: 1.5 V
MDC_IDC_SET_LEADCHNL_RV_PACING_ANODE_ELECTRODE_1: NORMAL
MDC_IDC_SET_LEADCHNL_RV_PACING_ANODE_LOCATION_1: NORMAL
MDC_IDC_SET_LEADCHNL_RV_PACING_CAPTURE_MODE: NORMAL
MDC_IDC_SET_LEADCHNL_RV_PACING_CATHODE_ELECTRODE_1: NORMAL
MDC_IDC_SET_LEADCHNL_RV_PACING_CATHODE_LOCATION_1: NORMAL
MDC_IDC_SET_LEADCHNL_RV_PACING_POLARITY: NORMAL
MDC_IDC_SET_LEADCHNL_RV_PACING_PULSEWIDTH: 0.4 MS
MDC_IDC_SET_LEADCHNL_RV_SENSING_ANODE_ELECTRODE_1: NORMAL
MDC_IDC_SET_LEADCHNL_RV_SENSING_ANODE_LOCATION_1: NORMAL
MDC_IDC_SET_LEADCHNL_RV_SENSING_CATHODE_ELECTRODE_1: NORMAL
MDC_IDC_SET_LEADCHNL_RV_SENSING_CATHODE_LOCATION_1: NORMAL
MDC_IDC_SET_LEADCHNL_RV_SENSING_POLARITY: NORMAL
MDC_IDC_SET_LEADCHNL_RV_SENSING_SENSITIVITY: 0.3 MV
MDC_IDC_SET_ZONE_DETECTION_BEATS_DENOMINATOR: 16 {BEATS}
MDC_IDC_SET_ZONE_DETECTION_BEATS_DENOMINATOR: 32 {BEATS}
MDC_IDC_SET_ZONE_DETECTION_BEATS_DENOMINATOR: 40 {BEATS}
MDC_IDC_SET_ZONE_DETECTION_BEATS_NUMERATOR: 16 {BEATS}
MDC_IDC_SET_ZONE_DETECTION_BEATS_NUMERATOR: 30 {BEATS}
MDC_IDC_SET_ZONE_DETECTION_BEATS_NUMERATOR: 32 {BEATS}
MDC_IDC_SET_ZONE_DETECTION_INTERVAL: 270 MS
MDC_IDC_SET_ZONE_DETECTION_INTERVAL: 320 MS
MDC_IDC_SET_ZONE_DETECTION_INTERVAL: 350 MS
MDC_IDC_SET_ZONE_DETECTION_INTERVAL: 360 MS
MDC_IDC_SET_ZONE_DETECTION_INTERVAL: 400 MS
MDC_IDC_SET_ZONE_STATUS: NORMAL
MDC_IDC_SET_ZONE_TYPE: NORMAL
MDC_IDC_SET_ZONE_VENDOR_TYPE: NORMAL
MDC_IDC_STAT_AT_BURDEN_PERCENT: 0 %
MDC_IDC_STAT_AT_DTM_END: NORMAL
MDC_IDC_STAT_AT_DTM_START: NORMAL
MDC_IDC_STAT_BRADY_AP_VP_PERCENT: 0.54 %
MDC_IDC_STAT_BRADY_AP_VS_PERCENT: 0.02 %
MDC_IDC_STAT_BRADY_AS_VP_PERCENT: 95.64 %
MDC_IDC_STAT_BRADY_AS_VS_PERCENT: 3.8 %
MDC_IDC_STAT_BRADY_DTM_END: NORMAL
MDC_IDC_STAT_BRADY_DTM_START: NORMAL
MDC_IDC_STAT_BRADY_RA_PERCENT_PACED: 0.71 %
MDC_IDC_STAT_BRADY_RV_PERCENT_PACED: 3.69 %
MDC_IDC_STAT_CRT_DTM_END: NORMAL
MDC_IDC_STAT_CRT_DTM_START: NORMAL
MDC_IDC_STAT_CRT_LV_PERCENT_PACED: 96.15 %
MDC_IDC_STAT_CRT_PERCENT_PACED: 3.67 %
MDC_IDC_STAT_EPISODE_RECENT_COUNT: 0
MDC_IDC_STAT_EPISODE_RECENT_COUNT: 5
MDC_IDC_STAT_EPISODE_RECENT_COUNT_DTM_END: NORMAL
MDC_IDC_STAT_EPISODE_RECENT_COUNT_DTM_START: NORMAL
MDC_IDC_STAT_EPISODE_TOTAL_COUNT: 0
MDC_IDC_STAT_EPISODE_TOTAL_COUNT: 6
MDC_IDC_STAT_EPISODE_TOTAL_COUNT_DTM_END: NORMAL
MDC_IDC_STAT_EPISODE_TOTAL_COUNT_DTM_START: NORMAL
MDC_IDC_STAT_EPISODE_TYPE: NORMAL
MDC_IDC_STAT_TACHYTHERAPY_ATP_DELIVERED_RECENT: 0
MDC_IDC_STAT_TACHYTHERAPY_ATP_DELIVERED_TOTAL: 0
MDC_IDC_STAT_TACHYTHERAPY_RECENT_DTM_END: NORMAL
MDC_IDC_STAT_TACHYTHERAPY_RECENT_DTM_START: NORMAL
MDC_IDC_STAT_TACHYTHERAPY_SHOCKS_ABORTED_RECENT: 0
MDC_IDC_STAT_TACHYTHERAPY_SHOCKS_ABORTED_TOTAL: 0
MDC_IDC_STAT_TACHYTHERAPY_SHOCKS_DELIVERED_RECENT: 0
MDC_IDC_STAT_TACHYTHERAPY_SHOCKS_DELIVERED_TOTAL: 0
MDC_IDC_STAT_TACHYTHERAPY_TOTAL_DTM_END: NORMAL
MDC_IDC_STAT_TACHYTHERAPY_TOTAL_DTM_START: NORMAL

## 2025-08-04 PROCEDURE — 93284 PRGRMG EVAL IMPLANTABLE DFB: CPT | Performed by: INTERNAL MEDICINE

## (undated) DEVICE — SHEATH WORLEY STD BRAIDED 40CM

## (undated) DEVICE — WIRE WHISPER EDS CS-J 4648

## (undated) DEVICE — CUSTOM PACK PACER ICD SAN5BPCHEA

## (undated) DEVICE — ELECTRODE DEFIB CADENCE 22550R

## (undated) DEVICE — SHEATH PRELUDE SNAP 7FRX13CM PLS-1007

## (undated) DEVICE — INTRO MICRO MINI STICK 5FR STIFF NITINOL

## (undated) DEVICE — CATH, QUADRAPOLAR DEFLECTABLE EP 5MM SPACING REPROCESSED

## (undated) DEVICE — POUCH TYRX ABSORB ANTIBACTERIAL LG

## (undated) DEVICE — SHEATH PRELUDE SNAP 13CM 9FR

## (undated) DEVICE — Device

## (undated) RX ORDER — FENTANYL CITRATE 50 UG/ML
INJECTION, SOLUTION INTRAMUSCULAR; INTRAVENOUS
Status: DISPENSED
Start: 2024-02-12

## (undated) RX ORDER — PROPOFOL 10 MG/ML
INJECTION, EMULSION INTRAVENOUS
Status: DISPENSED
Start: 2024-02-12

## (undated) RX ORDER — DEXAMETHASONE SODIUM PHOSPHATE 10 MG/ML
INJECTION, SOLUTION INTRAMUSCULAR; INTRAVENOUS
Status: DISPENSED
Start: 2024-02-12

## (undated) RX ORDER — CEFAZOLIN SODIUM/WATER 2 G/20 ML
SYRINGE (ML) INTRAVENOUS
Status: DISPENSED
Start: 2024-02-12

## (undated) RX ORDER — BUPIVACAINE HYDROCHLORIDE 5 MG/ML
INJECTION, SOLUTION EPIDURAL; INTRACAUDAL
Status: DISPENSED
Start: 2024-02-12

## (undated) RX ORDER — LIDOCAINE HYDROCHLORIDE 10 MG/ML
INJECTION, SOLUTION EPIDURAL; INFILTRATION; INTRACAUDAL; PERINEURAL
Status: DISPENSED
Start: 2024-02-12